# Patient Record
Sex: FEMALE | Race: WHITE | NOT HISPANIC OR LATINO | Employment: FULL TIME | URBAN - METROPOLITAN AREA
[De-identification: names, ages, dates, MRNs, and addresses within clinical notes are randomized per-mention and may not be internally consistent; named-entity substitution may affect disease eponyms.]

---

## 2017-09-19 DIAGNOSIS — Z12.31 ENCOUNTER FOR SCREENING MAMMOGRAM FOR MALIGNANT NEOPLASM OF BREAST: ICD-10-CM

## 2017-09-20 ENCOUNTER — ALLSCRIPTS OFFICE VISIT (OUTPATIENT)
Dept: OTHER | Facility: OTHER | Age: 52
End: 2017-09-20

## 2017-10-17 ENCOUNTER — GENERIC CONVERSION - ENCOUNTER (OUTPATIENT)
Dept: OTHER | Facility: OTHER | Age: 52
End: 2017-10-17

## 2017-11-13 ENCOUNTER — GENERIC CONVERSION - ENCOUNTER (OUTPATIENT)
Dept: OTHER | Facility: OTHER | Age: 52
End: 2017-11-13

## 2017-11-30 ENCOUNTER — GENERIC CONVERSION - ENCOUNTER (OUTPATIENT)
Dept: OTHER | Facility: OTHER | Age: 52
End: 2017-11-30

## 2017-11-30 LAB
BASOPHILS # BLD AUTO: 0 X10E3/UL (ref 0–0.2)
BASOPHILS # BLD AUTO: 1 %
CHOLEST SERPL-MCNC: 164 MG/DL (ref 100–199)
DEPRECATED RDW RBC AUTO: 13.6 % (ref 12.3–15.4)
EOSINOPHIL # BLD AUTO: 0 X10E3/UL (ref 0–0.4)
EOSINOPHIL # BLD AUTO: 1 %
HBA1C MFR BLD HPLC: 5 % (ref 4.8–5.6)
HCT VFR BLD AUTO: 42.5 % (ref 34–46.6)
HDLC SERPL-MCNC: 59 MG/DL
HGB BLD-MCNC: 14.5 G/DL (ref 11.1–15.9)
IMM.GRANULOCYTES (CD4/8) (HISTORICAL): 0 %
IMM.GRANULOCYTES (CD4/8) (HISTORICAL): 0 X10E3/UL (ref 0–0.1)
LDLC SERPL CALC-MCNC: 85 MG/DL (ref 0–99)
LYMPHOCYTES # BLD AUTO: 1.4 X10E3/UL (ref 0.7–3.1)
LYMPHOCYTES # BLD AUTO: 41 %
MCH RBC QN AUTO: 31.1 PG (ref 26.6–33)
MCHC RBC AUTO-ENTMCNC: 34.1 G/DL (ref 31.5–35.7)
MCV RBC AUTO: 91 FL (ref 79–97)
MONOCYTES # BLD AUTO: 0.2 X10E3/UL (ref 0.1–0.9)
MONOCYTES (HISTORICAL): 6 %
NEUTROPHILS # BLD AUTO: 1.7 X10E3/UL (ref 1.4–7)
NEUTROPHILS # BLD AUTO: 51 %
PLATELET # BLD AUTO: 181 X10E3/UL (ref 150–379)
RBC (HISTORICAL): 4.66 X10E6/UL (ref 3.77–5.28)
TRIGL SERPL-MCNC: 101 MG/DL (ref 0–149)
VLDLC SERPL CALC-MCNC: 20 MG/DL (ref 5–40)
WBC # BLD AUTO: 3.4 X10E3/UL (ref 3.4–10.8)

## 2017-12-01 LAB — TSH SERPL DL<=0.05 MIU/L-ACNC: 1.95 UIU/ML (ref 0.45–4.5)

## 2017-12-04 ENCOUNTER — GENERIC CONVERSION - ENCOUNTER (OUTPATIENT)
Dept: OTHER | Facility: OTHER | Age: 52
End: 2017-12-04

## 2017-12-21 ENCOUNTER — ALLSCRIPTS OFFICE VISIT (OUTPATIENT)
Dept: OTHER | Facility: OTHER | Age: 52
End: 2017-12-21

## 2017-12-22 NOTE — PROGRESS NOTES
Assessment   1  Postgastrectomy malabsorption (579 3) (K91 2,Z90 3)   2  Benign essential hypertension (401 1) (I10)   3  Depression (311) (F32 9)   4  Essential tremor (333 1) (G25 0)    Plan   Benign essential hypertension    · Lisinopril 10 MG Oral Tablet; take 1 tablet by mouth once daily  Essential tremor    · *1 - [facility]  *  Status: Active  Requested for: 68Dhr0030  Care Summary provided  : Yes    Discussion/Summary   Discussion Summary:    Taper off and stop propranolol, reduce to 1 tablet daily for 2 weeks, then reduce to 1/2 tablet daily for 2 weeks then stop   lisinopril for Blood pressure once off propranolol   maint issues, has orders for screenings   reviewed and are all good  to neurology for eval of tremors  Counseling Documentation With Imm: The patient was counseled regarding instructions for management  Medication SE Review and Pt Understands Tx: Possible side effects of new medications were reviewed with the patient/guardian today  The treatment plan was reviewed with the patient/guardian  The patient/guardian understands and agrees with the treatment plan      Chief Complaint   Chief Complaint Free Text Note Form: PT here for a 3 month check up - labs were done in Nov       History of Present Illness   HPI: c/o a few occassions of feeling dizzy and lightheaded, chronic fatigue      Review of Systems   Complete-Female:      Constitutional: feeling tired, but-- No fever, no chills, feels well, no tiredness, no recent weight gain or weight loss  Eyes: No complaints of eye pain, no red eyes, no eyesight problems, no discharge, no dry eyes, no itching of eyes  ENT: no complaints of earache, no loss of hearing, no nose bleeds, no nasal discharge, no sore throat, no hoarseness  Cardiovascular: slow heart rate, but-- No complaints of slow heart rate, no fast heart rate, no chest pain, no palpitations, no leg claudication, no lower extremity edema  Respiratory: No complaints of shortness of breath, no wheezing, no cough, no SOB on exertion, no orthopnea, no PND  Gastrointestinal: No complaints of abdominal pain, no constipation, no nausea or vomiting, no diarrhea, no bloody stools  Genitourinary: No complaints of dysuria, no incontinence, no pelvic pain, no dysmenorrhea, no vaginal discharge or bleeding  Musculoskeletal: No complaints of arthralgias, no myalgias, no joint swelling or stiffness, no limb pain or swelling  Integumentary: No complaints of skin rash or lesions, no itching, no skin wounds, no breast pain or lump  Neurological: dizziness, but-- No complaints of headache, no confusion, no convulsions, no numbness, no dizziness or fainting, no tingling, no limb weakness, no difficulty walking-- and-- no fainting  Psychiatric: Not suicidal, no sleep disturbance, no anxiety or depression, no change in personality, no emotional problems  Endocrine: No complaints of proptosis, no hot flashes, no muscle weakness, no deepening of the voice, no feelings of weakness  Hematologic/Lymphatic: No complaints of swollen glands, no swollen glands in the neck, does not bleed easily, does not bruise easily  Active Problems   1  Anxiety (300 00) (F41 9)   2  Benign essential hypertension (401 1) (I10)   3  Depression (311) (F32 9)   4  Essential tremor (333 1) (G25 0)   5  No advance directives (V49 89) (Z78 9)   6  Obesity (278 00) (E66 9)   7  Postgastrectomy malabsorption (579 3) (K91 2,Z90 3)   8  Vitamin B 12 deficiency (266 2) (E53 8)   9  Vitamin D deficiency (268 9) (E55 9)   10  Weight gain (783 1) (R63 5)    Past Medical History   1  History of No significant past medical history    Surgical History   1  History of Cholecystectomy   2  History of Gastric Surgery For Morbid Obesity Gastric Bypass    Family History   Mother    1  Family history of Diabetes Mellitus (V18 0)   2   Family history of Hypertension (V17 49)  Father    3  Family history of Hypertension (V17 49)  Sister    4  Family history of substance abuse (V17 0) (Z81 4)  Brother    5  Family history of Congenital Heart Disease   6  Family history of Congestive Heart Failure   7  Family history of Diabetes Mellitus (V18 0)   8  Family history of acute myocardial infarction (V17 3) (Z82 49)   9  Family history of substance abuse (V17 0) (Z81 4)   10  Family history of Hypertension (V17 49)  Family History    11  Family history of substance abuse (V17 0) (Z81 4)   12  Denied: Family history of Mental problem    Social History    · Denied: History of Drug use   · Former smoker   · Good dental hygiene   · Lives with friend   · Living Situation: Supportive and safe   · Denied: History of Never a smoker   · No advance directives (V49 89) (Z78 9)   · Social drinker    Current Meds    1  B-12 1000 MCG Sublingual Tablet Sublingual; Take as directed Recorded   2  Calcium Citrate 500 MG CAPS; take 1 capsule 4 times daily; Therapy: (Recorded:51Yyd7896) to Recorded   3  Citalopram Hydrobromide 40 MG Oral Tablet; take 1 tablet by mouth every day; Therapy: 20BDJ2416 to (Evaluate:15Lmc5614)  Requested for: 63Ahi0131; Last Rx:67Poh4571     Ordered   4  Multiple Vitamin TABS; Take 1 tablet daily Recorded   5  Propranolol HCl - 20 MG Oral Tablet; Take 1 tablet twice daily; Therapy: 96SFN8054 to 9845 8544)  Requested for: 73PGI4062; Last Rx:63Oxr8431     Ordered  Medication List Reviewed: The medication list was reviewed and updated today  Allergies   1  No Known Allergies    Vitals   Vital Signs    Recorded: 39Ivx0300 09:50AM   Heart Rate 48   Systolic 317, LUE, Sitting   Diastolic 84, LUE, Sitting   Height 5 ft 6 5 in   Weight 245 lb    BMI Calculated 38 95   BSA Calculated 2 19     Physical Exam        Constitutional      General appearance: No acute distress, well appearing and well nourished         Eyes      Conjunctiva and lids: No swelling, erythema or discharge  Pupils and irises: Equal, round and reactive to light  Ears, Nose, Mouth, and Throat      External inspection of ears and nose: Normal        Otoscopic examination: Tympanic membranes translucent with normal light reflex  Canals patent without erythema  Nasal mucosa, septum, and turbinates: Normal without edema or erythema  Oropharynx: Normal with no erythema, edema, exudate or lesions  Pulmonary      Respiratory effort: No increased work of breathing or signs of respiratory distress  Auscultation of lungs: Clear to auscultation  Cardiovascular      Palpation of heart: Normal PMI, no thrills  Auscultation of heart: Normal rate and rhythm, normal S1 and S2, without murmurs  Examination of extremities for edema and/or varicosities: Normal        Carotid pulses: Normal        Abdomen      Abdomen: Non-tender, no masses  Liver and spleen: No hepatomegaly or splenomegaly  Lymphatic      Palpation of lymph nodes in neck: No lymphadenopathy  Musculoskeletal      Gait and station: Normal        Digits and nails: Normal without clubbing or cyanosis  Inspection/palpation of joints, bones, and muscles: Normal        Skin      Skin and subcutaneous tissue: Normal without rashes or lesions  Neurologic      Cranial nerves: Cranial nerves 2-12 intact  Reflexes: 2+ and symmetric  Sensation: No sensory loss         Psychiatric      Orientation to person, place, and time: Normal        Mood and affect: Normal           Signatures    Electronically signed by : RONALD Hall ; Dec 21 2017 10:16AM EST                       (Author)

## 2018-01-10 NOTE — MISCELLANEOUS
Message   Recorded as Task   Date: 10/10/2016 09:31 AM, Created By: Irene Blackwell   Task Name: Follow Up   Assigned To: Ariadna Grier   Regarding Patient: Ketan Schultz, Status: Active   CommentAdella Miami - 10 Oct 2016 9:31 AM     TASK CREATED  Ariadna, I may have already sent this but not sure so if you would please, call patient to schedule follow up appt  Thanks   Per assigned task, I left message for THE [facility] patient about scheduling an appointment at our office since patient is overdue for a follow up  Active Problems    1  Anxiety (300 00) (F41 9)   2  Benign essential hypertension (401 1) (I10)   3  Depression (311) (F32 9)   4  Impaired fasting glucose (790 21) (R73 01)   5  Iron deficiency anemia (280 9) (D50 9)   6  Leukopenia (288 50) (D72 819)   7  Denied: History of Mental health problem   8  Morbid obesity (278 01) (E66 01)   9  No advance directives (V49 89) (Z78 9)   10  Obesity (278 00) (E66 9)   11  Obesity surgery status (V45 86) (Z98 84)   12  Postgastrectomy malabsorption (579 3) (K91 2,Z90 3)   13  Preop cardiovascular exam (V72 81) (Z01 810)   14  Denied: History of Substance abuse   15  Visit for pre-operative examination (V72 84) (Z01 818)   16  Vitamin B 12 deficiency (266 2) (E53 8)   17  Vitamin D deficiency (268 9) (E55 9)   18  Weight gain (783 1) (R63 5)    Current Meds   1  B-12 1000 MCG Sublingual Tablet Sublingual; Take as directed Recorded   2  Calcium Citrate 500 MG CAPS; take 1 capsule 4 times daily; Therapy: (Recorded:38Ayd7511) to Recorded   3  Citalopram Hydrobromide 40 MG Oral Tablet; take 1 tablet by mouth every day; Therapy: 94IWT9725 to (Jina Grant)  Requested for: 57VEQ5819; Last   Rx:08Jun2016 Ordered   4  Lisinopril 5 MG Oral Tablet; take 1 tablet every day; Therapy: 83OZC4553 to (Evaluate:03Jan2017)  Requested for: 78BHR6706; Last   Rx:40Toi0467 Ordered   5  Multiple Vitamin TABS; Take 1 tablet daily Recorded    Allergies    1  No Known Allergies    Signatures   Electronically signed by : Dinorah Gambino, ; Oct 10 2016  9:36AM EST                       (Author)

## 2018-01-13 NOTE — MISCELLANEOUS
Message   Recorded as Task   Date: 10/13/2017 10:10 AM, Created By: Harlo Fothergill   Task Name: Follow Up   Assigned To: Sarbjit Ray   Regarding Patient: Cristian Pacheco, Status: In Progress   Comment:    Amol Sellersla - 13 Oct 2017 10:10 AM     TASK CREATED  Please call patient to schedule 2 year f/u appointment with office  Thank you  Marcos Mcgowan - 16 Oct 2017 3:35 PM     TASK IN PROGRESS   Marcos Mcgowan - 16 Oct 2017 3:36 PM     TASK EDITED  Lynda Beach - 17 Oct 2017 9:26 AM     TASK EDITED  lm twice   Per assigned task for patient Asim Daniel unable to reach patient  LM twice  Active Problems    1  Anxiety (300 00) (F41 9)   2  Benign essential hypertension (401 1) (I10)   3  Depression (311) (F32 9)   4  Essential tremor (333 1) (G25 0)   5  No advance directives (V49 89) (Z78 9)   6  Obesity (278 00) (E66 9)   7  Postgastrectomy malabsorption (579 3) (K91 2,Z90 3)   8  Vitamin B 12 deficiency (266 2) (E53 8)   9  Vitamin D deficiency (268 9) (E55 9)   10  Weight gain (783 1) (R63 5)    Current Meds   1  B-12 1000 MCG Sublingual Tablet Sublingual; Take as directed Recorded   2  Calcium Citrate 500 MG CAPS; take 1 capsule 4 times daily; Therapy: (Recorded:16Cad6551) to Recorded   3  Citalopram Hydrobromide 40 MG Oral Tablet; take 1 tablet by mouth every day; Therapy: 40AVZ2074 to (Evaluate:43Nzq3046)  Requested for: 82ANZ8384; Last   Rx:29Jun2017 Ordered   4  Multiple Vitamin TABS; Take 1 tablet daily Recorded   5  Propranolol HCl - 20 MG Oral Tablet; Take 1 tablet twice daily; Therapy: 72GOG7365 to (Evaluate:96Ctb1852)  Requested for: 25HOK7682; Last   Rx:10Ktg9243 Ordered    Allergies    1   No Known Allergies    Signatures   Electronically signed by : John Browne, ; Oct 17 2017  9:26AM EST                       (Author)

## 2018-01-15 ENCOUNTER — GENERIC CONVERSION - ENCOUNTER (OUTPATIENT)
Dept: FAMILY MEDICINE CLINIC | Facility: HOSPITAL | Age: 53
End: 2018-01-15

## 2018-01-15 NOTE — MISCELLANEOUS
Message   Recorded as Task   Date: 11/09/2017 10:18 AM, Created By: Libby Singh   Task Name: Follow Up   Assigned To: Jaci Brennan   Regarding Patient: Vernida Hashimoto, Status: In Progress   Comment:    Mildred Sellers - 09 Nov 2017 10:18 AM     TASK CREATED  Please call patient to schedule 5 year f/u appointment with office  Thank you  Marcos Mcgowan - 10 Nov 2017 9:11 AM     TASK IN PROGRESS   Marcos Mcgowan - 10 Nov 2017 9:12 AM     TASK EDITED  Qukavitha Mikeying - 13 Nov 2017 10:33 AM     TASK EDITED  lm twice   Per assigned task for patient Ria Rao unable to reach patient  LM       Active Problems    1  Anxiety (300 00) (F41 9)   2  Benign essential hypertension (401 1) (I10)   3  Depression (311) (F32 9)   4  Essential tremor (333 1) (G25 0)   5  No advance directives (V49 89) (Z78 9)   6  Obesity (278 00) (E66 9)   7  Postgastrectomy malabsorption (579 3) (K91 2,Z90 3)   8  Vitamin B 12 deficiency (266 2) (E53 8)   9  Vitamin D deficiency (268 9) (E55 9)   10  Weight gain (783 1) (R63 5)    Current Meds   1  B-12 1000 MCG Sublingual Tablet Sublingual; Take as directed Recorded   2  Calcium Citrate 500 MG CAPS; take 1 capsule 4 times daily; Therapy: (Recorded:49Jts1631) to Recorded   3  Citalopram Hydrobromide 40 MG Oral Tablet; take 1 tablet by mouth every day; Therapy: 84HND3730 to (Evaluate:59Htx7520)  Requested for: 61EXF3003; Last   Rx:29Jun2017 Ordered   4  Multiple Vitamin TABS; Take 1 tablet daily Recorded   5  Propranolol HCl - 20 MG Oral Tablet; Take 1 tablet twice daily; Therapy: 01RSK1597 to (Evaluate:18Jan2018)  Requested for: 28ACA7568; Last   Rx:37Npp3618 Ordered    Allergies    1   No Known Allergies    Signatures   Electronically signed by : Zay Coburn, ; Nov 13 2017 10:33AM EST                       (Author)

## 2018-01-18 ENCOUNTER — GENERIC CONVERSION - ENCOUNTER (OUTPATIENT)
Dept: OTHER | Facility: OTHER | Age: 53
End: 2018-01-18

## 2018-01-18 NOTE — PROGRESS NOTES
Assessment    1  Benign essential hypertension (401 1) (I10)   2  Depression (311) (F32 9)   3  History of morbid obesity (V13 89) (Z87 898)   4  Obesity (278 00) (E66 9)   5  Encounter for preventive health examination (V70 0) (Z00 00)   6  Essential tremor (333 1) (G25 0)    Plan  Benign essential hypertension    · Lisinopril 5 MG Oral Tablet  Essential tremor    · Propranolol HCl - 20 MG Oral Tablet; Take 1 tablet twice daily  Obesity    · (1) HEMOGLOBIN A1C; Status:Active; Requested for:20Cdj1531;     Discussion/Summary  health maintenance visit Currently, she eats a poor diet and has an inadequate exercise regimen  Health maint, labs ordered, mammo ordered, colonoscopy discussed  HTN, adjust meds, stop lisinopril  Tremor, likely benign, consider neuro consult  ABD pian, consider HH, might need GI  The patient was counseled regarding instructions for management  Possible side effects of new medications were reviewed with the patient/guardian today  The treatment plan was reviewed with the patient/guardian  The patient/guardian understands and agrees with the treatment plan      Chief Complaint  Pt is here today for a yearly check up Medications are current  History of Present Illness  , Adult Female: The patient is being seen for a health maintenance evaluation  The last health maintenance visit was 1 yr year(s) ago  General Health: The patient's health since the last visit is described as good  She has regular dental visits  She denies vision problems  She denies hearing loss  Immunizations status: up to date  Lifestyle:  She has weight concerns  Screening:   HPI: c/o episodic confusion and SOB, also c/o occ mid epigastric pain      Review of Systems    Constitutional: feeling poorly and feeling tired, but No fever, no chills, feels well, no tiredness, no recent weight gain or weight loss     Eyes: No complaints of eye pain, no red eyes, no eyesight problems, no discharge, no dry eyes, no itching of eyes  ENT: no complaints of earache, no loss of hearing, no nose bleeds, no nasal discharge, no sore throat, no hoarseness  Cardiovascular: No complaints of slow heart rate, no fast heart rate, no chest pain, no palpitations, no leg claudication, no lower extremity edema  Respiratory: No complaints of shortness of breath, no wheezing, no cough, no SOB on exertion, no orthopnea, no PND  Gastrointestinal: abdominal pain, but No complaints of abdominal pain, no constipation, no nausea or vomiting, no diarrhea, no bloody stools, no nausea and no diarrhea  Genitourinary: No complaints of dysuria, no incontinence, no pelvic pain, no dysmenorrhea, no vaginal discharge or bleeding  Musculoskeletal: No complaints of arthralgias, no myalgias, no joint swelling or stiffness, no limb pain or swelling  Integumentary: No complaints of skin rash or lesions, no itching, no skin wounds, no breast pain or lump  Neurological: confusion, dizziness, limb weakness and tremor UE, but No complaints of headache, no confusion, no convulsions, no numbness, no dizziness or fainting, no tingling, no limb weakness, no difficulty walking  Psychiatric: Not suicidal, no sleep disturbance, no anxiety or depression, no change in personality, no emotional problems  Endocrine: No complaints of proptosis, no hot flashes, no muscle weakness, no deepening of the voice, no feelings of weakness  feelings of weakness   Hematologic/Lymphatic: No complaints of swollen glands, no swollen glands in the neck, does not bleed easily, does not bruise easily  Active Problems    1  Anxiety (300 00) (F41 9)   2  Benign essential hypertension (401 1) (I10)   3  Depression (311) (F32 9)   4  Encounter for screening mammogram for breast cancer (V76 12) (Z12 31)   5  Impaired fasting glucose (790 21) (R73 01)   6  Iron deficiency anemia (280 9) (D50 9)   7  Leukopenia (288 50) (D72 819)   8  Denied: History of Mental health problem   9  No advance directives (V49 89) (Z78 9)   10  Obesity (278 00) (E66 9)   11  Obesity surgery status (V45 86) (Z98 84)   12  Postgastrectomy malabsorption (579 3) (K91 2,Z90 3)   13  Preop cardiovascular exam (V72 81) (Z01 810)   14  Screening for colon cancer (V76 51) (Z12 11)   15  Denied: History of Substance abuse   16  Visit for pre-operative examination (V72 84) (Z01 818)   17  Vitamin B 12 deficiency (266 2) (E53 8)   18  Vitamin D deficiency (268 9) (E55 9)   19  Weight gain (783 1) (R63 5)    Past Medical History    · Denied: History of Mental health problem   · History of No significant past medical history   · Denied: History of Substance abuse    Surgical History    · History of Cholecystectomy   · History of Gastric Surgery For Morbid Obesity Gastric Bypass    Family History  Mother    · Family history of Diabetes Mellitus (V18 0)   · Family history of Hypertension (V17 49)  Father    · Family history of Hypertension (V17 49)  Sister    · Family history of substance abuse (V17 0) (Z81 4)  Brother    · Family history of Congenital Heart Disease   · Family history of Congestive Heart Failure   · Family history of Diabetes Mellitus (V18 0)   · Family history of acute myocardial infarction (V17 3) (Z82 49)   · Family history of substance abuse (V17 0) (Z81 4)   · Family history of Hypertension (V17 49)  Family History    · Family history of substance abuse (V17 0) (Z81 4)   · Denied: Family history of Mental problem    Social History    · Denied: History of Drug use   · Former smoker   · Good dental hygiene   · Lives with friend   · Living Situation: Supportive and safe   · Denied: History of Never a smoker   · No advance directives (V49 89) (Z78 9)   · Social drinker    Current Meds   1  B-12 1000 MCG Sublingual Tablet Sublingual; Take as directed Recorded   2  Calcium Citrate 500 MG CAPS; take 1 capsule 4 times daily; Therapy: (Recorded:40Qqb0329) to Recorded   3   Citalopram Hydrobromide 40 MG Oral Tablet; take 1 tablet by mouth every day; Therapy: 10GQX0355 to (Evaluate:15Zfh5874)  Requested for: 99CSF8206; Last   Rx:29Jun2017 Ordered   4  Lisinopril 5 MG Oral Tablet; take 1 tablet every day; Therapy: 74WBF2631 to (Hiren Sevilla)  Requested for: 28Fpm5698; Last   Rx:94Drq3407 Ordered   5  Multiple Vitamin TABS; Take 1 tablet daily Recorded    Allergies    1  No Known Allergies    Vitals   Recorded: 39JZP5560 08:43AM   Heart Rate 60, L PT   Pulse Quality Normal, L PT   Systolic 598, LUE, Sitting   Diastolic 98, LUE, Sitting   Height 5 ft 6 5 in   Weight 237 lb    BMI Calculated 37 68   BSA Calculated 2 16     Physical Exam    Constitutional   General appearance: No acute distress, well appearing and well nourished  Eyes   Conjunctiva and lids: No swelling, erythema or discharge  Pupils and irises: Equal, round, reactive to light  Ophthalmoscopic examination: Normal fundi and optic discs  Ears, Nose, Mouth, and Throat   External inspection of ears and nose: Normal     Otoscopic examination: Tympanic membranes translucent with normal light reflex  Canals patent without erythema  Hearing: Normal     Nasal mucosa, septum, and turbinates: Normal without edema or erythema  Lips, teeth, and gums: Normal, good dentition  Oropharynx: Normal with no erythema, edema, exudate or lesions  Neck   Neck: Supple, symmetric, trachea midline, no masses  Thyroid: Normal, no thyromegaly  Pulmonary   Respiratory effort: No increased work of breathing or signs of respiratory distress  Percussion of chest: Normal     Palpation of chest: Normal     Auscultation of lungs: Clear to auscultation  Cardiovascular   Palpation of heart: Normal PMI, no thrills  Auscultation of heart: Normal rate and rhythm, normal S1 and S2, no murmurs  Carotid pulses: 2+ bilaterally  Abdominal aorta: Normal     Femoral pulses: 2+ bilaterally  Pedal pulses: 2+ bilaterally      Examination of extremities for edema and/or varicosities: Normal     Chest   Breasts: Normal, no dimpling or skin changes appreciated  Palpation of breasts and axillae: Normal, no masses palpated  Abdomen   Abdomen: Non-tender, no masses  Liver and spleen: No hepatomegaly or splenomegaly  Examination for hernias: No hernia appreciated  Anus, perineum, and rectum: Normal sphincter tone, no masses, no prolapse  Stool sample for occult blood: Negative  Genitourinary   External genitalia and vagina: Normal, no lesions appreciated  Urethra: Normal, no discharge  Bladder: Not distended, no tenderness  Cervix: Normal, no lesions  Uterus: Normal size, no tenderness, no masses  Adnexa/Parametria: Normal, no masses or tenderness  Lymphatic   Palpation of lymph nodes in neck: No lymphadenopathy  Palpation of lymph nodes in axillae: No lymphadenopathy  Palpation of lymph nodes in groin: No lymphadenopathy  Palpation of lymph nodes in other areas: No lymphadenopathy  Musculoskeletal   Gait and station: Normal     Digits and nails: Normal without clubbing or cyanosis  Joints, bones, and muscles: Normal     Range of motion: Normal     Stability: Normal     Muscle strength/tone: Normal     Skin   Skin and subcutaneous tissue: Normal without rashes or lesions  Palpation of skin and subcutaneous tissue: Normal turgor  Neurologic   Cranial nerves: Cranial nerves II-XII intact  Reflexes: 2+ and symmetric  Sensation: No sensory loss  Psychiatric   Judgment and insight: Normal     Orientation to person, place, and time: Normal     Recent and remote memory: Intact      Mood and affect: Normal        Signatures   Electronically signed by : RONALD Guerrero ; Sep 20 2017  9:32AM EST                       (Author)

## 2018-01-19 ENCOUNTER — ALLSCRIPTS OFFICE VISIT (OUTPATIENT)
Dept: OTHER | Facility: OTHER | Age: 53
End: 2018-01-19

## 2018-01-20 NOTE — PROGRESS NOTES
Assessment   1  Intractable episodic tension-type headache (339 11) (G44 211)   2  Benign essential hypertension (401 1) (I10)   3  Obesity (278 00) (E66 9)   4  Postgastrectomy malabsorption (579 3) (K91 2,Z90 3)   5  Vitamin D deficiency (268 9) (E55 9)   6  Vitamin B 12 deficiency (266 2) (E53 8)    Plan   Intractable episodic tension-type headache    · Butalbital-APAP-Caff-Cod -36-30 MG Oral Capsule; TAKE 1 CAPSULE    EVERY 4 TO 6 HOURS AS NEEDED    Discussion/Summary      Reviewed hospital/ER records, did not receive fax, only CT and lab results  Recommend Butalbital/APAP for headache  Discussed goals for blood pressure: recommended below 140/90, if level exceeds over 200/130 or patient gets signs and sx  of stroke or MI, info given to patient, go to ER ASAP  Monitor temp  and continue to monitor sx  Recommend otc Iberogast for gastroparesis and GI motility disorder  Call office Monday concerning blood pressure readings and temp  readings  Safe to take 2 lisinopril if blood pressure goes above 140/90  RTO in 2 weeks  Possible side effects of new medications were reviewed with the patient/guardian today  The treatment plan was reviewed with the patient/guardian  The patient/guardian understands and agrees with the treatment plan      Chief Complaint   Pt is here complaining of a severe headache that came on suddenly 5 days ago  Went to [facility] ER x 2  Records in chart  Weaned off Propranolol  Nausea, Denies cough/cold symptoms  CR      History of Present Illness   HPI: 47year old white female presents with wife c/o ongoing abd  pain  and headaches  Was at [facility] yesterday and Monday, for severe headache, and abd  pain may be due to headache  A lot of headaches since meds  were changed  EKG and CT done of head was negative  Headache from back of head, and sometimes forehead area  Blood pressure fluctuation: 180/134, in the ambulance yesterday, today at home- 177/112, 191/108, 181/104   This afternoon was 142/84  Was given Zofran, and Metoclopramide  Hx  of gastric bypass, unable to take NSAID's, tried Tylenol which took the edge off only  Review of Systems        Constitutional: feeling poorly-- and-- feeling tired, but-- no fever-- and-- no chills  Gastrointestinal: abdominal pain,-- nausea-- and-- vomiting  Active Problems   1  Anxiety (300 00) (F41 9)   2  Benign essential hypertension (401 1) (I10)   3  Depression (311) (F32 9)   4  Essential tremor (333 1) (G25 0)   5  No advance directives (V49 89) (Z78 9)   6  Obesity (278 00) (E66 9)   7  Postgastrectomy malabsorption (579 3) (K91 2,Z90 3)   8  Vitamin B 12 deficiency (266 2) (E53 8)   9  Vitamin D deficiency (268 9) (E55 9)   10  Weight gain (783 1) (R63 5)    Social History    · Denied: History of Drug use   · Former smoker   · Good dental hygiene   · Lives with friend   · Living Situation: Supportive and safe   · Denied: History of Never a smoker   · No advance directives (V49 89) (Z78 9)   · Social drinker  The social history was reviewed and updated today  The social history was reviewed and is unchanged  Current Meds    1  B-12 1000 MCG Sublingual Tablet Sublingual; Take as directed Recorded   2  Calcium Citrate 500 MG CAPS; take 1 capsule 4 times daily; Therapy: (Recorded:[id number]) to Recorded   3  Citalopram Hydrobromide 40 MG Oral Tablet; take 1 tablet by mouth every day; Therapy: 46UYP1861 to (Evaluate:10Jun2018)  Requested for: 92Mir3226; Last     Rx:88Fpk1889 Ordered   4  Lisinopril 10 MG Oral Tablet; take 1 tablet by mouth once daily; Therapy: 01Ldw8779 to (Evaluate:19Jun2018)  Requested for: 15Ayo0518; Last     Rx:91Thi0905 Ordered   5  Metoclopramide HCl - 10 MG Oral Tablet; TAKE 1 TABLET 3 TIMES DAILY BEFORE     MEALS; Therapy: 54KDM4834 to Recorded   6  Multiple Vitamin TABS; Take 1 tablet daily Recorded   7  Ondansetron 4 MG Oral Tablet Disintegrating;      Therapy: 11DUI6353 to Recorded 8  Propranolol HCl - 20 MG Oral Tablet; Take 1 tablet twice daily; Therapy: 89OAM8330 to (Evaluate:18Jan2018)  Requested for: 38LZD1238; Last     Rx:20Sep2017 Ordered     The medication list was reviewed and updated today  Allergies   1  No Known Allergies    Vitals    Recorded: 00JDO0515 03:48PM Recorded: 81ZAZ8083 03:03PM   Temperature  99 2 F, Tympanic   Heart Rate  57   Systolic 533, RUE, Sitting 132, RUE, Sitting   Diastolic 90, RUE, Sitting 84, RUE, Sitting   BP CUFF SIZE  Large   Height  5 ft 6 5 in   Weight  235 lb    BMI Calculated  37 36   BSA Calculated  2 15     Physical Exam        Constitutional      General appearance: Abnormal  -- Appears in distress, head down, wearing dark glasses  Eyes      Conjunctiva and lids: No swelling, erythema or discharge  -- Darkness noted around eyes  Pupils and irises: Equal, round and reactive to light  Ears, Nose, Mouth, and Throat      External inspection of ears and nose: Normal        Otoscopic examination: Tympanic membranes translucent with normal light reflex  Canals patent without erythema  Nasal mucosa, septum, and turbinates: Normal without edema or erythema  Oropharynx: Normal with no erythema, edema, exudate or lesions  Pulmonary      Respiratory effort: No increased work of breathing or signs of respiratory distress  Auscultation of lungs: Clear to auscultation  Musculoskeletal      Gait and station: Normal        Digits and nails: Normal without clubbing or cyanosis         Inspection/palpation of joints, bones, and muscles: Normal           Signatures    Electronically signed by : YOKO Hogue; Jan 19 2018  4:19PM EST                       (Author)     Electronically signed by : RONALD Hernandez ; Jan 19 2018  4:32PM EST

## 2018-01-22 ENCOUNTER — HOSPITAL ENCOUNTER (INPATIENT)
Facility: HOSPITAL | Age: 53
LOS: 1 days | DRG: 069 | End: 2018-01-23
Attending: EMERGENCY MEDICINE | Admitting: HOSPITALIST
Payer: COMMERCIAL

## 2018-01-22 VITALS
WEIGHT: 237 LBS | BODY MASS INDEX: 37.2 KG/M2 | SYSTOLIC BLOOD PRESSURE: 146 MMHG | HEIGHT: 67 IN | HEART RATE: 60 BPM | DIASTOLIC BLOOD PRESSURE: 98 MMHG

## 2018-01-22 DIAGNOSIS — R51.9 OCCIPITAL HEADACHE: Primary | ICD-10-CM

## 2018-01-22 DIAGNOSIS — R51.9 INTRACTABLE HEADACHE: ICD-10-CM

## 2018-01-22 DIAGNOSIS — I10 UNCONTROLLED HYPERTENSION: ICD-10-CM

## 2018-01-22 LAB
ALBUMIN SERPL BCP-MCNC: 4.2 G/DL (ref 3.5–5)
ALP SERPL-CCNC: 76 U/L (ref 46–116)
ALT SERPL W P-5'-P-CCNC: 31 U/L (ref 12–78)
ANION GAP SERPL CALCULATED.3IONS-SCNC: 9 MMOL/L (ref 4–13)
AST SERPL W P-5'-P-CCNC: 19 U/L (ref 5–45)
BASOPHILS # BLD AUTO: 0.01 THOUSANDS/ΜL (ref 0–0.1)
BASOPHILS NFR BLD AUTO: 0 % (ref 0–1)
BILIRUB SERPL-MCNC: 0.4 MG/DL (ref 0.2–1)
BUN SERPL-MCNC: 11 MG/DL (ref 5–25)
CALCIUM SERPL-MCNC: 8.9 MG/DL (ref 8.3–10.1)
CHLORIDE SERPL-SCNC: 102 MMOL/L (ref 100–108)
CO2 SERPL-SCNC: 29 MMOL/L (ref 21–32)
CREAT SERPL-MCNC: 0.74 MG/DL (ref 0.6–1.3)
EOSINOPHIL # BLD AUTO: 0.01 THOUSAND/ΜL (ref 0–0.61)
EOSINOPHIL NFR BLD AUTO: 0 % (ref 0–6)
ERYTHROCYTE [DISTWIDTH] IN BLOOD BY AUTOMATED COUNT: 13.1 % (ref 11.6–15.1)
GFR SERPL CREATININE-BSD FRML MDRD: 94 ML/MIN/1.73SQ M
GLUCOSE SERPL-MCNC: 130 MG/DL (ref 65–140)
HCT VFR BLD AUTO: 45.5 % (ref 34.8–46.1)
HGB BLD-MCNC: 15.4 G/DL (ref 11.5–15.4)
LYMPHOCYTES # BLD AUTO: 1.01 THOUSANDS/ΜL (ref 0.6–4.47)
LYMPHOCYTES NFR BLD AUTO: 16 % (ref 14–44)
MCH RBC QN AUTO: 32.2 PG (ref 26.8–34.3)
MCHC RBC AUTO-ENTMCNC: 33.8 G/DL (ref 31.4–37.4)
MCV RBC AUTO: 95 FL (ref 82–98)
MONOCYTES # BLD AUTO: 0.53 THOUSAND/ΜL (ref 0.17–1.22)
MONOCYTES NFR BLD AUTO: 8 % (ref 4–12)
NEUTROPHILS # BLD AUTO: 4.94 THOUSANDS/ΜL (ref 1.85–7.62)
NEUTS SEG NFR BLD AUTO: 76 % (ref 43–75)
PLATELET # BLD AUTO: 192 THOUSANDS/UL (ref 149–390)
PMV BLD AUTO: 10.1 FL (ref 8.9–12.7)
POTASSIUM SERPL-SCNC: 3.7 MMOL/L (ref 3.5–5.3)
PROT SERPL-MCNC: 8 G/DL (ref 6.4–8.2)
RBC # BLD AUTO: 4.78 MILLION/UL (ref 3.81–5.12)
SODIUM SERPL-SCNC: 140 MMOL/L (ref 136–145)
WBC # BLD AUTO: 6.5 THOUSAND/UL (ref 4.31–10.16)

## 2018-01-22 PROCEDURE — 96361 HYDRATE IV INFUSION ADD-ON: CPT

## 2018-01-22 PROCEDURE — 96375 TX/PRO/DX INJ NEW DRUG ADDON: CPT

## 2018-01-22 PROCEDURE — 80053 COMPREHEN METABOLIC PANEL: CPT | Performed by: EMERGENCY MEDICINE

## 2018-01-22 PROCEDURE — 36415 COLL VENOUS BLD VENIPUNCTURE: CPT | Performed by: EMERGENCY MEDICINE

## 2018-01-22 PROCEDURE — 85025 COMPLETE CBC W/AUTO DIFF WBC: CPT | Performed by: EMERGENCY MEDICINE

## 2018-01-22 PROCEDURE — 96374 THER/PROPH/DIAG INJ IV PUSH: CPT

## 2018-01-22 RX ORDER — DEXAMETHASONE SODIUM PHOSPHATE 10 MG/ML
10 INJECTION, SOLUTION INTRAMUSCULAR; INTRAVENOUS ONCE
Status: COMPLETED | OUTPATIENT
Start: 2018-01-22 | End: 2018-01-22

## 2018-01-22 RX ORDER — DIPHENHYDRAMINE HYDROCHLORIDE 50 MG/ML
25 INJECTION INTRAMUSCULAR; INTRAVENOUS ONCE
Status: COMPLETED | OUTPATIENT
Start: 2018-01-22 | End: 2018-01-22

## 2018-01-22 RX ORDER — KETOROLAC TROMETHAMINE 30 MG/ML
30 INJECTION, SOLUTION INTRAMUSCULAR; INTRAVENOUS ONCE
Status: COMPLETED | OUTPATIENT
Start: 2018-01-22 | End: 2018-01-22

## 2018-01-22 RX ORDER — METOCLOPRAMIDE HYDROCHLORIDE 5 MG/ML
10 INJECTION INTRAMUSCULAR; INTRAVENOUS ONCE
Status: COMPLETED | OUTPATIENT
Start: 2018-01-22 | End: 2018-01-22

## 2018-01-22 RX ADMIN — KETOROLAC TROMETHAMINE 30 MG: 30 INJECTION, SOLUTION INTRAMUSCULAR; INTRAVENOUS at 22:29

## 2018-01-22 RX ADMIN — METOCLOPRAMIDE 10 MG: 5 INJECTION, SOLUTION INTRAMUSCULAR; INTRAVENOUS at 22:39

## 2018-01-22 RX ADMIN — DIPHENHYDRAMINE HYDROCHLORIDE 25 MG: 50 INJECTION, SOLUTION INTRAMUSCULAR; INTRAVENOUS at 22:34

## 2018-01-22 RX ADMIN — SODIUM CHLORIDE 1000 ML: 0.9 INJECTION, SOLUTION INTRAVENOUS at 22:00

## 2018-01-22 RX ADMIN — DEXAMETHASONE SODIUM PHOSPHATE 10 MG: 10 INJECTION, SOLUTION INTRAMUSCULAR; INTRAVENOUS at 22:35

## 2018-01-23 ENCOUNTER — HOSPITAL ENCOUNTER (INPATIENT)
Facility: HOSPITAL | Age: 53
LOS: 5 days | Discharge: HOME | DRG: 069 | End: 2018-01-28
Attending: INTERNAL MEDICINE | Admitting: INTERNAL MEDICINE
Payer: COMMERCIAL

## 2018-01-23 ENCOUNTER — APPOINTMENT (INPATIENT)
Dept: CT IMAGING | Facility: HOSPITAL | Age: 53
DRG: 069 | End: 2018-01-23
Payer: COMMERCIAL

## 2018-01-23 VITALS
OXYGEN SATURATION: 98 % | WEIGHT: 241.62 LBS | SYSTOLIC BLOOD PRESSURE: 193 MMHG | RESPIRATION RATE: 18 BRPM | BODY MASS INDEX: 38.83 KG/M2 | DIASTOLIC BLOOD PRESSURE: 100 MMHG | TEMPERATURE: 98.2 F | HEIGHT: 66 IN | HEART RATE: 60 BPM

## 2018-01-23 VITALS
SYSTOLIC BLOOD PRESSURE: 132 MMHG | WEIGHT: 245 LBS | HEIGHT: 67 IN | BODY MASS INDEX: 38.45 KG/M2 | HEART RATE: 48 BPM | DIASTOLIC BLOOD PRESSURE: 84 MMHG

## 2018-01-23 VITALS
BODY MASS INDEX: 36.88 KG/M2 | HEART RATE: 57 BPM | TEMPERATURE: 99.2 F | HEIGHT: 67 IN | DIASTOLIC BLOOD PRESSURE: 90 MMHG | WEIGHT: 235 LBS | SYSTOLIC BLOOD PRESSURE: 160 MMHG

## 2018-01-23 DIAGNOSIS — R51.9 INTRACTABLE HEADACHE: Primary | ICD-10-CM

## 2018-01-23 DIAGNOSIS — I67.841 REVERSIBLE CEREBROVASCULAR VASOCONSTRICTION SYNDROME: ICD-10-CM

## 2018-01-23 DIAGNOSIS — F17.200 NICOTINE DEPENDENCE: ICD-10-CM

## 2018-01-23 DIAGNOSIS — I60.9 SAH (SUBARACHNOID HEMORRHAGE) (HCC): ICD-10-CM

## 2018-01-23 DIAGNOSIS — F32.A DEPRESSION: ICD-10-CM

## 2018-01-23 PROBLEM — Z98.84 HISTORY OF GASTRIC BYPASS: Chronic | Status: ACTIVE | Noted: 2018-01-23

## 2018-01-23 PROBLEM — Z98.84 HISTORY OF GASTRIC BYPASS: Status: ACTIVE | Noted: 2018-01-23

## 2018-01-23 PROBLEM — E66.09 OBESITY DUE TO EXCESS CALORIES: Chronic | Status: ACTIVE | Noted: 2018-01-23

## 2018-01-23 PROBLEM — E66.09 OBESITY DUE TO EXCESS CALORIES: Status: ACTIVE | Noted: 2018-01-23

## 2018-01-23 PROBLEM — I10 ACCELERATED HYPERTENSION: Status: ACTIVE | Noted: 2018-01-23

## 2018-01-23 PROBLEM — K59.00 CONSTIPATION: Chronic | Status: ACTIVE | Noted: 2018-01-23

## 2018-01-23 PROBLEM — K59.00 CONSTIPATION: Status: ACTIVE | Noted: 2018-01-23

## 2018-01-23 PROBLEM — I10 HYPERTENSION: Chronic | Status: ACTIVE | Noted: 2018-01-22

## 2018-01-23 LAB
ANION GAP SERPL CALCULATED.3IONS-SCNC: 9 MMOL/L (ref 4–13)
BUN SERPL-MCNC: 10 MG/DL (ref 5–25)
CALCIUM SERPL-MCNC: 8.9 MG/DL (ref 8.3–10.1)
CHLORIDE SERPL-SCNC: 105 MMOL/L (ref 100–108)
CO2 SERPL-SCNC: 27 MMOL/L (ref 21–32)
CREAT SERPL-MCNC: 0.64 MG/DL (ref 0.6–1.3)
CRP SERPL QL: 3.2 MG/L
ERYTHROCYTE [DISTWIDTH] IN BLOOD BY AUTOMATED COUNT: 13.1 % (ref 11.6–15.1)
ERYTHROCYTE [SEDIMENTATION RATE] IN BLOOD: 13 MM/HOUR (ref 0–15)
GFR SERPL CREATININE-BSD FRML MDRD: 104 ML/MIN/1.73SQ M
GLUCOSE P FAST SERPL-MCNC: 134 MG/DL (ref 65–99)
GLUCOSE SERPL-MCNC: 134 MG/DL (ref 65–140)
HCT VFR BLD AUTO: 45.1 % (ref 34.8–46.1)
HGB BLD-MCNC: 14.7 G/DL (ref 11.5–15.4)
MCH RBC QN AUTO: 30.9 PG (ref 26.8–34.3)
MCHC RBC AUTO-ENTMCNC: 32.6 G/DL (ref 31.4–37.4)
MCV RBC AUTO: 95 FL (ref 82–98)
PLATELET # BLD AUTO: 204 THOUSANDS/UL (ref 149–390)
PMV BLD AUTO: 9.8 FL (ref 8.9–12.7)
POTASSIUM SERPL-SCNC: 3.9 MMOL/L (ref 3.5–5.3)
RBC # BLD AUTO: 4.76 MILLION/UL (ref 3.81–5.12)
SODIUM SERPL-SCNC: 141 MMOL/L (ref 136–145)
WBC # BLD AUTO: 3.2 THOUSAND/UL (ref 4.31–10.16)

## 2018-01-23 PROCEDURE — 86618 LYME DISEASE ANTIBODY: CPT | Performed by: PSYCHIATRY & NEUROLOGY

## 2018-01-23 PROCEDURE — 70496 CT ANGIOGRAPHY HEAD: CPT

## 2018-01-23 PROCEDURE — 99284 EMERGENCY DEPT VISIT MOD MDM: CPT

## 2018-01-23 PROCEDURE — 85027 COMPLETE CBC AUTOMATED: CPT | Performed by: NURSE PRACTITIONER

## 2018-01-23 PROCEDURE — 86146 BETA-2 GLYCOPROTEIN ANTIBODY: CPT | Performed by: PSYCHIATRY & NEUROLOGY

## 2018-01-23 PROCEDURE — 99223 1ST HOSP IP/OBS HIGH 75: CPT | Performed by: HOSPITALIST

## 2018-01-23 PROCEDURE — 86592 SYPHILIS TEST NON-TREP QUAL: CPT | Performed by: PSYCHIATRY & NEUROLOGY

## 2018-01-23 PROCEDURE — 86038 ANTINUCLEAR ANTIBODIES: CPT | Performed by: PSYCHIATRY & NEUROLOGY

## 2018-01-23 PROCEDURE — 80048 BASIC METABOLIC PNL TOTAL CA: CPT | Performed by: NURSE PRACTITIONER

## 2018-01-23 PROCEDURE — 86235 NUCLEAR ANTIGEN ANTIBODY: CPT | Performed by: PSYCHIATRY & NEUROLOGY

## 2018-01-23 PROCEDURE — 70498 CT ANGIOGRAPHY NECK: CPT

## 2018-01-23 PROCEDURE — 85652 RBC SED RATE AUTOMATED: CPT | Performed by: PSYCHIATRY & NEUROLOGY

## 2018-01-23 PROCEDURE — 86140 C-REACTIVE PROTEIN: CPT | Performed by: PSYCHIATRY & NEUROLOGY

## 2018-01-23 RX ORDER — LISINOPRIL 10 MG/1
10 TABLET ORAL DAILY
Status: DISCONTINUED | OUTPATIENT
Start: 2018-01-23 | End: 2018-01-23 | Stop reason: HOSPADM

## 2018-01-23 RX ORDER — KETOROLAC TROMETHAMINE 30 MG/ML
30 INJECTION, SOLUTION INTRAMUSCULAR; INTRAVENOUS EVERY 12 HOURS SCHEDULED
Status: DISCONTINUED | OUTPATIENT
Start: 2018-01-23 | End: 2018-01-23

## 2018-01-23 RX ORDER — NICOTINE 21 MG/24HR
1 PATCH, TRANSDERMAL 24 HOURS TRANSDERMAL DAILY
Status: DISCONTINUED | OUTPATIENT
Start: 2018-01-23 | End: 2018-01-23

## 2018-01-23 RX ORDER — DIPHENHYDRAMINE HYDROCHLORIDE 50 MG/ML
25 INJECTION INTRAMUSCULAR; INTRAVENOUS EVERY 6 HOURS PRN
Status: DISCONTINUED | OUTPATIENT
Start: 2018-01-23 | End: 2018-01-23

## 2018-01-23 RX ORDER — VERAPAMIL HYDROCHLORIDE 80 MG/1
40 TABLET ORAL EVERY 8 HOURS SCHEDULED
Status: DISCONTINUED | OUTPATIENT
Start: 2018-01-23 | End: 2018-01-25

## 2018-01-23 RX ORDER — MAGNESIUM SULFATE HEPTAHYDRATE 40 MG/ML
2 INJECTION, SOLUTION INTRAVENOUS
Status: DISCONTINUED | OUTPATIENT
Start: 2018-01-23 | End: 2018-01-23 | Stop reason: HOSPADM

## 2018-01-23 RX ORDER — DOCUSATE SODIUM 100 MG/1
100 CAPSULE, LIQUID FILLED ORAL 2 TIMES DAILY
Status: DISCONTINUED | OUTPATIENT
Start: 2018-01-24 | End: 2018-01-24 | Stop reason: SDUPTHER

## 2018-01-23 RX ORDER — SODIUM CHLORIDE 9 MG/ML
75 INJECTION, SOLUTION INTRAVENOUS CONTINUOUS
Status: DISCONTINUED | OUTPATIENT
Start: 2018-01-23 | End: 2018-01-23 | Stop reason: HOSPADM

## 2018-01-23 RX ORDER — CITALOPRAM 20 MG/1
20 TABLET ORAL DAILY
Status: DISCONTINUED | OUTPATIENT
Start: 2018-01-24 | End: 2018-01-23

## 2018-01-23 RX ORDER — SODIUM CHLORIDE 9 MG/ML
75 INJECTION, SOLUTION INTRAVENOUS CONTINUOUS
Status: CANCELLED | OUTPATIENT
Start: 2018-01-23

## 2018-01-23 RX ORDER — METOCLOPRAMIDE HYDROCHLORIDE 5 MG/ML
10 INJECTION INTRAMUSCULAR; INTRAVENOUS EVERY 6 HOURS PRN
Status: DISCONTINUED | OUTPATIENT
Start: 2018-01-23 | End: 2018-01-23

## 2018-01-23 RX ORDER — LISINOPRIL 10 MG/1
10 TABLET ORAL
Status: DISCONTINUED | OUTPATIENT
Start: 2018-01-24 | End: 2018-01-23

## 2018-01-23 RX ORDER — ONDANSETRON 2 MG/ML
4 INJECTION INTRAMUSCULAR; INTRAVENOUS EVERY 4 HOURS PRN
Status: DISCONTINUED | OUTPATIENT
Start: 2018-01-23 | End: 2018-01-28 | Stop reason: HOSPADM

## 2018-01-23 RX ORDER — HYDRALAZINE HYDROCHLORIDE 20 MG/ML
10 INJECTION INTRAMUSCULAR; INTRAVENOUS EVERY 6 HOURS PRN
Status: DISCONTINUED | OUTPATIENT
Start: 2018-01-23 | End: 2018-01-23 | Stop reason: HOSPADM

## 2018-01-23 RX ORDER — CITALOPRAM 20 MG/1
40 TABLET ORAL
Status: DISCONTINUED | OUTPATIENT
Start: 2018-01-24 | End: 2018-01-24

## 2018-01-23 RX ORDER — HYDRALAZINE HYDROCHLORIDE 20 MG/ML
10 INJECTION INTRAMUSCULAR; INTRAVENOUS EVERY 6 HOURS PRN
Status: CANCELLED | OUTPATIENT
Start: 2018-01-23

## 2018-01-23 RX ORDER — LISINOPRIL 10 MG/1
10 TABLET ORAL
Refills: 5 | COMMUNITY
Start: 2017-12-21 | End: 2018-02-09 | Stop reason: SDUPTHER

## 2018-01-23 RX ORDER — POLYETHYLENE GLYCOL 3350 17 G/17G
17 POWDER, FOR SOLUTION ORAL DAILY
Status: CANCELLED | OUTPATIENT
Start: 2018-01-24

## 2018-01-23 RX ORDER — ACETAMINOPHEN 325 MG/1
650 TABLET ORAL EVERY 4 HOURS PRN
Status: DISCONTINUED | OUTPATIENT
Start: 2018-01-23 | End: 2018-01-25

## 2018-01-23 RX ORDER — ONDANSETRON 2 MG/ML
4 INJECTION INTRAMUSCULAR; INTRAVENOUS EVERY 6 HOURS PRN
Status: DISCONTINUED | OUTPATIENT
Start: 2018-01-23 | End: 2018-01-23

## 2018-01-23 RX ORDER — VERAPAMIL HYDROCHLORIDE 80 MG/1
40 TABLET ORAL EVERY 8 HOURS SCHEDULED
Status: CANCELLED | OUTPATIENT
Start: 2018-01-23

## 2018-01-23 RX ORDER — METOCLOPRAMIDE HYDROCHLORIDE 5 MG/ML
10 INJECTION INTRAMUSCULAR; INTRAVENOUS EVERY 8 HOURS SCHEDULED
Status: DISCONTINUED | OUTPATIENT
Start: 2018-01-23 | End: 2018-01-23

## 2018-01-23 RX ORDER — AMOXICILLIN 250 MG
2 CAPSULE ORAL DAILY
Status: DISCONTINUED | OUTPATIENT
Start: 2018-01-24 | End: 2018-01-28 | Stop reason: HOSPADM

## 2018-01-23 RX ORDER — BUTALBITAL/ASPIRIN/CAFFEINE 50-325-40
1 CAPSULE ORAL EVERY 4 HOURS PRN
COMMUNITY
End: 2018-01-28 | Stop reason: HOSPADM

## 2018-01-23 RX ORDER — ONDANSETRON 2 MG/ML
4 INJECTION INTRAMUSCULAR; INTRAVENOUS EVERY 6 HOURS PRN
Status: DISCONTINUED | OUTPATIENT
Start: 2018-01-23 | End: 2018-01-23 | Stop reason: HOSPADM

## 2018-01-23 RX ORDER — BUTALBITAL, ACETAMINOPHEN AND CAFFEINE 50; 325; 40 MG/1; MG/1; MG/1
1 TABLET ORAL EVERY 4 HOURS PRN
Status: CANCELLED | OUTPATIENT
Start: 2018-01-23

## 2018-01-23 RX ORDER — BUTALBITAL, ACETAMINOPHEN AND CAFFEINE 50; 325; 40 MG/1; MG/1; MG/1
1 TABLET ORAL EVERY 4 HOURS PRN
Status: DISCONTINUED | OUTPATIENT
Start: 2018-01-23 | End: 2018-01-25

## 2018-01-23 RX ORDER — HYDRALAZINE HYDROCHLORIDE 20 MG/ML
5 INJECTION INTRAMUSCULAR; INTRAVENOUS EVERY 4 HOURS PRN
Status: DISCONTINUED | OUTPATIENT
Start: 2018-01-23 | End: 2018-01-25

## 2018-01-23 RX ORDER — POLYETHYLENE GLYCOL 3350 17 G/17G
17 POWDER, FOR SOLUTION ORAL DAILY
Status: DISCONTINUED | OUTPATIENT
Start: 2018-01-23 | End: 2018-01-23 | Stop reason: HOSPADM

## 2018-01-23 RX ORDER — VERAPAMIL HYDROCHLORIDE 80 MG/1
40 TABLET ORAL EVERY 8 HOURS SCHEDULED
Status: DISCONTINUED | OUTPATIENT
Start: 2018-01-23 | End: 2018-01-23 | Stop reason: HOSPADM

## 2018-01-23 RX ORDER — CITALOPRAM 20 MG/1
20 TABLET ORAL DAILY
Status: CANCELLED | OUTPATIENT
Start: 2018-01-24

## 2018-01-23 RX ORDER — SODIUM CHLORIDE 9 MG/ML
75 INJECTION, SOLUTION INTRAVENOUS CONTINUOUS
Status: DISCONTINUED | OUTPATIENT
Start: 2018-01-23 | End: 2018-01-25

## 2018-01-23 RX ORDER — HYDRALAZINE HYDROCHLORIDE 20 MG/ML
10 INJECTION INTRAMUSCULAR; INTRAVENOUS EVERY 6 HOURS PRN
Status: DISCONTINUED | OUTPATIENT
Start: 2018-01-23 | End: 2018-01-23

## 2018-01-23 RX ORDER — MAGNESIUM SULFATE HEPTAHYDRATE 40 MG/ML
2 INJECTION, SOLUTION INTRAVENOUS
Status: CANCELLED | OUTPATIENT
Start: 2018-01-24 | End: 2018-01-26

## 2018-01-23 RX ORDER — MAGNESIUM SULFATE HEPTAHYDRATE 40 MG/ML
2 INJECTION, SOLUTION INTRAVENOUS
Status: COMPLETED | OUTPATIENT
Start: 2018-01-24 | End: 2018-01-25

## 2018-01-23 RX ORDER — CITALOPRAM 40 MG/1
40 TABLET ORAL
Refills: 5 | COMMUNITY
Start: 2017-12-17 | End: 2018-01-28 | Stop reason: HOSPADM

## 2018-01-23 RX ORDER — CITALOPRAM 20 MG/1
20 TABLET ORAL DAILY
Status: DISCONTINUED | OUTPATIENT
Start: 2018-01-23 | End: 2018-01-23 | Stop reason: HOSPADM

## 2018-01-23 RX ORDER — HYDROCODONE BITARTRATE AND ACETAMINOPHEN 5; 325 MG/1; MG/1
1 TABLET ORAL EVERY 4 HOURS PRN
Status: DISCONTINUED | OUTPATIENT
Start: 2018-01-23 | End: 2018-01-25

## 2018-01-23 RX ORDER — POLYETHYLENE GLYCOL 3350 17 G/17G
17 POWDER, FOR SOLUTION ORAL DAILY
Status: DISCONTINUED | OUTPATIENT
Start: 2018-01-24 | End: 2018-01-23

## 2018-01-23 RX ORDER — METOCLOPRAMIDE 10 MG/1
10 TABLET ORAL 3 TIMES DAILY PRN
COMMUNITY
End: 2018-01-28 | Stop reason: HOSPADM

## 2018-01-23 RX ORDER — LISINOPRIL 10 MG/1
10 TABLET ORAL DAILY
Status: DISCONTINUED | OUTPATIENT
Start: 2018-01-24 | End: 2018-01-28 | Stop reason: HOSPADM

## 2018-01-23 RX ORDER — DIPHENHYDRAMINE HYDROCHLORIDE 50 MG/ML
25 INJECTION INTRAMUSCULAR; INTRAVENOUS EVERY 8 HOURS
Status: DISCONTINUED | OUTPATIENT
Start: 2018-01-23 | End: 2018-01-23

## 2018-01-23 RX ORDER — HYDRALAZINE HYDROCHLORIDE 20 MG/ML
10 INJECTION INTRAMUSCULAR; INTRAVENOUS ONCE
Status: COMPLETED | OUTPATIENT
Start: 2018-01-23 | End: 2018-01-23

## 2018-01-23 RX ORDER — LISINOPRIL 10 MG/1
10 TABLET ORAL DAILY
Status: CANCELLED | OUTPATIENT
Start: 2018-01-24

## 2018-01-23 RX ORDER — LABETALOL HYDROCHLORIDE 5 MG/ML
10 INJECTION, SOLUTION INTRAVENOUS EVERY 4 HOURS PRN
Status: DISCONTINUED | OUTPATIENT
Start: 2018-01-23 | End: 2018-01-23

## 2018-01-23 RX ORDER — NICOTINE 21 MG/24HR
1 PATCH, TRANSDERMAL 24 HOURS TRANSDERMAL DAILY
Status: DISCONTINUED | OUTPATIENT
Start: 2018-01-24 | End: 2018-01-24

## 2018-01-23 RX ORDER — ONDANSETRON 2 MG/ML
4 INJECTION INTRAMUSCULAR; INTRAVENOUS EVERY 6 HOURS PRN
Status: CANCELLED | OUTPATIENT
Start: 2018-01-23

## 2018-01-23 RX ORDER — POLYETHYLENE GLYCOL 3350 17 G/17G
17 POWDER, FOR SOLUTION ORAL DAILY
Status: DISCONTINUED | OUTPATIENT
Start: 2018-01-24 | End: 2018-01-28 | Stop reason: HOSPADM

## 2018-01-23 RX ORDER — BUTALBITAL, ACETAMINOPHEN AND CAFFEINE 50; 325; 40 MG/1; MG/1; MG/1
1 TABLET ORAL EVERY 4 HOURS PRN
Status: DISCONTINUED | OUTPATIENT
Start: 2018-01-23 | End: 2018-01-23 | Stop reason: HOSPADM

## 2018-01-23 RX ADMIN — METOCLOPRAMIDE 10 MG: 5 INJECTION, SOLUTION INTRAMUSCULAR; INTRAVENOUS at 08:55

## 2018-01-23 RX ADMIN — BUTALBITAL, ACETAMINOPHEN AND CAFFEINE 1 TABLET: 50; 325; 40 TABLET ORAL at 07:21

## 2018-01-23 RX ADMIN — HYDROMORPHONE HYDROCHLORIDE 0.5 MG: 1 INJECTION, SOLUTION INTRAMUSCULAR; INTRAVENOUS; SUBCUTANEOUS at 23:01

## 2018-01-23 RX ADMIN — POLYETHYLENE GLYCOL 3350 17 G: 17 POWDER, FOR SOLUTION ORAL at 14:01

## 2018-01-23 RX ADMIN — BISACODYL 10 MG: 5 TABLET, COATED ORAL at 14:01

## 2018-01-23 RX ADMIN — IOHEXOL 85 ML: 350 INJECTION, SOLUTION INTRAVENOUS at 16:13

## 2018-01-23 RX ADMIN — HYDROMORPHONE HYDROCHLORIDE 0.2 MG: 1 INJECTION, SOLUTION INTRAMUSCULAR; INTRAVENOUS; SUBCUTANEOUS at 17:48

## 2018-01-23 RX ADMIN — LISINOPRIL 10 MG: 10 TABLET ORAL at 08:55

## 2018-01-23 RX ADMIN — CITALOPRAM HYDROBROMIDE 20 MG: 20 TABLET ORAL at 14:01

## 2018-01-23 RX ADMIN — SODIUM CHLORIDE 100 ML/HR: 0.9 INJECTION, SOLUTION INTRAVENOUS at 14:01

## 2018-01-23 RX ADMIN — SODIUM CHLORIDE 75 ML/HR: 0.9 INJECTION, SOLUTION INTRAVENOUS at 23:04

## 2018-01-23 RX ADMIN — HYDRALAZINE HYDROCHLORIDE 10 MG: 20 INJECTION INTRAMUSCULAR; INTRAVENOUS at 17:48

## 2018-01-23 RX ADMIN — VERAPAMIL HYDROCHLORIDE 40 MG: 80 TABLET, FILM COATED ORAL at 15:37

## 2018-01-23 RX ADMIN — DIPHENHYDRAMINE HYDROCHLORIDE 25 MG: 50 INJECTION, SOLUTION INTRAMUSCULAR; INTRAVENOUS at 08:55

## 2018-01-23 RX ADMIN — SODIUM CHLORIDE 100 ML/HR: 0.9 INJECTION, SOLUTION INTRAVENOUS at 00:40

## 2018-01-23 RX ADMIN — MAGNESIUM SULFATE HEPTAHYDRATE 2 G: 40 INJECTION, SOLUTION INTRAVENOUS at 14:02

## 2018-01-23 RX ADMIN — HYDROMORPHONE HYDROCHLORIDE 0.2 MG: 1 INJECTION, SOLUTION INTRAMUSCULAR; INTRAVENOUS; SUBCUTANEOUS at 14:01

## 2018-01-23 NOTE — PROGRESS NOTES
Progress Note - Nick Caceres 1965, 47 y o  female MRN: [id number]    Unit/Bed#: [address] [place] [address] Encounter: [id number]    Primary Care Provider: Shree Sawant MD   Date and time admitted to hospital: 1/22/2018  9:29 PM    * Intractable headache   Assessment & Plan    Without known hx of migraines or headaches  ?related to recent weaning of propranolol  Recent CTH at [facility] ER reportedly negative, I do not have this results  D/W Neurology, may consider MRI Brain, appreciate their input   Continue migraine cocktail and add Magnesium        Hypertension   Assessment & Plan    Continue lisinopril 10 mg daily  Consider increasing to 20 mg if pressures are uncontrolled consistently         Constipation   Assessment & Plan    Add bowel regimen  Pt reports no significant BM in ~1 week  Abdomen exam benign  Consider increasing aggressiveness of regimen based on results         History of gastric bypass   Assessment & Plan    · Stable          VTE Pharmacologic Prophylaxis:   Pharmacologic: low risk, ambulate   Mechanical VTE Prophylaxis in Place: No    Patient Centered Rounds: I have performed bedside rounds with nursing staff today  Discussions with Specialists or Other Care Team Provider: Neuro BENJAMÍN Xiao    Education and Discussions with Family / Patient: patient  Significant other at bedside  Time Spent for Care: 30 minutes  More than 50% of total time spent on counseling and coordination of care as described above  Current Length of Stay: 0 day(s)    Current Patient Status: OBS  Certification Statement: The patient, admitted on an observation basis, will now require > 2 midnight hospital stay due to still with significant h/a, may need MRI per neurolgoy     Discharge Plan: not yet stable  Maybe 24 hours if doing well  Code Status: Level 1 - Full Code      Subjective:   Pt reports feeling a bit better but still with headache in occipital region   No current paresthesias but did note L arm numbness yesterday and pins/needles in fingertips  She denies any hx of this  She is wanting to get relief from her headache  She is also c/o constipation, no nausea or vomiting  Objective:     Vitals:   Temp (24hrs), Av 7 °F (36 5 °C), Min:97 4 °F (36 3 °C), Max:97 9 °F (36 6 °C)    HR:  [52-76] 52  Resp:  [16-20] 20  BP: (129-220)/() 135/80  SpO2:  [95 %-100 %] 97 %  Body mass index is 39 kg/m²  Input and Output Summary (last 24 hours):     No intake or output data in the 24 hours ending 18 1243    Physical Exam:     Physical Exam   Constitutional: She is oriented to person, place, and time  No distress  Cardiovascular: Normal rate, regular rhythm and intact distal pulses  Pulmonary/Chest: Effort normal and breath sounds normal  No respiratory distress  She has no wheezes  Abdominal: Soft  Bowel sounds are normal  She exhibits no distension  There is no tenderness  Musculoskeletal: She exhibits no edema  Neurological: She is alert and oriented to person, place, and time  No cranial nerve deficit  Skin: Skin is warm and dry  She is not diaphoretic  Psychiatric: She has a normal mood and affect  Nursing note and vitals reviewed  Additional Data:     Labs:      Results from last 7 days  Lab Units 18  0549 18  2200   WBC Thousand/uL 3 20* 6 50   HEMOGLOBIN g/dL 14 7 15 4   HEMATOCRIT % 45 1 45 5   PLATELETS Thousands/uL 204 192   NEUTROS PCT %  --  76*   LYMPHS PCT %  --  16   MONOS PCT %  --  8   EOS PCT %  --  0       Results from last 7 days  Lab Units 18  0600 18  2200   SODIUM mmol/L 141 140   POTASSIUM mmol/L 3 9 3 7   CHLORIDE mmol/L 105 102   CO2 mmol/L 27 29   BUN mg/dL 10 11   CREATININE mg/dL 0 64 0 74   CALCIUM mg/dL 8 9 8 9   TOTAL PROTEIN g/dL  --  8 0   BILIRUBIN TOTAL mg/dL  --  0 40   ALK PHOS U/L  --  76   ALT U/L  --  31   AST U/L  --  19   GLUCOSE RANDOM mg/dL 134 130           * I Have Reviewed All Lab Data Listed Above    * Additional Pertinent Lab Tests Reviewed: All Labs Within Last 24 Hours Reviewed    Imaging:    Imaging Reports Reviewed Today Include: none  Imaging Personally Reviewed by Myself Includes:  none    Recent Cultures (last 7 days):           Last 24 Hours Medication List:     bisacodyl 10 mg Oral Once   diphenhydrAMINE 25 mg Intravenous Q8H   ketorolac 30 mg Intravenous Q12H [address]   lisinopril 10 mg Oral Daily   magnesium sulfate 2 g Intravenous Q24H HENRY   metoclopramide 10 mg Intravenous Q8H [address]   polyethylene glycol 17 g Oral Daily        Today, Patient Was Seen By: Vasile Padron PA-C    ** Please Note: Dictation voice to text software may have been used in the creation of this document   **

## 2018-01-23 NOTE — ASSESSMENT & PLAN NOTE
Continue lisinopril 10 mg daily  Consider increasing to 20 mg if pressures are uncontrolled consistently

## 2018-01-23 NOTE — LETTER
January 28, 2018     Patient: Melina Martinez   YOB: 1965   Date of Visit: 1/23/2018       To Whom it May Concern:    Rupinder Petersen was admitted into the hospital between 1/22/2018 and 1/28/2018 and under my care between the dates of 1/23/2018 and 1/28/2018  She may return to work on 2/12/2018, but has been advised not to return to work until then  Additionally, even after that time, she may need time to allow for important follow up appointments  We appreciate your understanding in this matter  Sincerely,          Kristina Ford DO  Hospitalist for [place] [id number] [facility]   [address] [place] [address]   No name on file          CC: No Recipients

## 2018-01-23 NOTE — SOCIAL WORK
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

## 2018-01-23 NOTE — ASSESSMENT & PLAN NOTE
-as per neurology etiology is RCVS (reverisble cerebral vasoconstriction syndrome)  -CTA head: Small volume of subarachnoid hemorrhage at the vertex bilaterally right more so than left  No focal intracranial stenosis or aneurysm   No cause for the patient's subarachnoid hemorrhage   No significant carotid or vertebral stenosis    -cont Verapamil 40mg PO Q8H, started today   -as per my discussion with neuro attending, transfer to [place] as pt will need conventional angiogram

## 2018-01-23 NOTE — CONSULTS
Consultation - Neurology   Светлана Tomas 47 y o  female MRN: [id number]  Unit/Bed#: [address] [place] [address] Encounter: [id number]      Physician Requesting Consult: Benjamin Kim MD  Reason for Consult / Principal Problem:  Intractable headache  Hx and PE limited by:  None  Review of previous medical records completed  Family, was present at the bedside for history and examination    Assessment/Plan:  1  Intractable headache: likely Reversible Cerebral Vasoconstrictive syndrome (RCVS) with long term use of SSRI medication vs vasculitis vs stroke   Patient with constant headache since 1/15/18 with 4 episodes of Thunder clap headaches, sudden onset, crushing pain, nausea, vomiting and photosensitivity  -f/u CTA head and neck pending, if unremarkable likely transport to [facility] for conventional angiogram  -f/u MRI Brain with contrast pending  -continue IV fluids  -f/u labs: lyme, RPR, CLOVIS, Sjogren's, Beta-2 glycoprotein  C-reactive protein, sed rate  -continue pain management with opiate medication like morphine or dilaudid  -continue Magnesium Sulfate IV 2g daily   -start Verapamil; appreciate Internal medicine input  -decrease Celexa dose to 20 mg daily; consider changing to non SSRI medication    2  HTN:  Recently started on lisinopril by PCP     3  Tremor: 20 year history  Progressively worsening  Has outpatient appointment with [facility]      HPI: Светлана Tomas is a 47 y o  female pmh of HTN, Gastric bypass surgery, tremors, Vitamin B12 and D deficiency who  presents with intractable headache  Patient states that her  headache first started on 1/15/18 when she was at the [employer]  She was bending over in her car when she had a severe crushing headache, " It came on all of sudden and took me to my knees"  She was taken to [facility] where she had a CT Head which she reports was negative and was released with f/u with her PCP    She reports her headache being constant since then, but has had 3 more episodes of thunder clap headaches, sudden onset of severe occipital pain which  starts in her occipital region and then radiates to the top of her head  She describes accompanying photosensitivity, nausea, vomiting and "pins and needles" in her finger tips, but no visual changes, diplopia, or dizziness  She found some relief with the  Fioricet her PCP prescribed, hot and ice packs, sitting up with her head turned to the right, rocking, and sometimes moaning  She states she does not have increased stress or sleep deprivation, and actually sleeps well  PCP recommended her returning to the ED yesterday with her 4th severe headache and associated, numbness and tingling in her left arm, nausea, vomiting, and photosensitivity  Pain was not relieved until she received IV medicaiton  Of note, patient reports recent medication changes  She states that her PCP has been weaning her off of her propranolol over 4 weeks due to heart rate issues  She was cut down to 1/2 tab of propanol  and started lisinopril for her hypertension when her headache started on 1/15/18  She was taking propranolol for control of her tremors that she reports having for 20 years  She reports her tremors have been worsening and she has an outpatient appointment with [facility] on 3/15/18  ROS: 12 system cued query: negative except as noted in HPI      Historical Information     Past Medical History:   Diagnosis Date    Anxiety     Hypertension     Migraine      Past Surgical History:   Procedure Laterality Date    CHOLECYSTECTOMY      GASTRIC BYPASS         Social History         Family History: History reviewed  No pertinent family history        No Known Allergies  Meds:  all current active meds have been reviewed, current meds:   Current Facility-Administered Medications   Medication Dose Route Frequency    butalbital-acetaminophen-caffeine (FIORICET,ESGIC) -40 mg per tablet 1 tablet  1 tablet Oral Q4H PRN    diphenhydrAMINE (BENADRYL) injection 25 mg  25 mg Intravenous Q6H PRN    lisinopril (ZESTRIL) tablet 10 mg  10 mg Oral Daily    metoclopramide (REGLAN) injection 10 mg  10 mg Intravenous Q6H PRN    nicotine (NICODERM CQ) 14 mg/24hr TD 24 hr patch 1 patch  1 patch Transdermal Daily    ondansetron (ZOFRAN) injection 4 mg  4 mg Intravenous Q6H PRN    sodium chloride 0 9 % infusion  100 mL/hr Intravenous Continuous    and PTA meds:   Prior to Admission Medications   Prescriptions Last Dose Informant Patient Reported? Taking? butalbital-acetaminophen-caffeine-codeine (FIORICET WITH CODEINE) -96-30 MG per capsule   Yes Yes   Sig: Take 1 capsule by mouth every 4 (four) hours as needed for headaches   citalopram (CeleXA) 40 mg tablet   Yes No   Sig: Take 40 mg by mouth daily with breakfast   lisinopril (ZESTRIL) 10 mg tablet   Yes No   Sig: Take 10 mg by mouth daily with breakfast   metoclopramide (REGLAN) 10 mg tablet   Yes Yes   Sig: Take 10 mg by mouth 3 (three) times a day as needed      Facility-Administered Medications: None       Scheduled Meds:  lisinopril 10 mg Oral Daily   nicotine 1 patch Transdermal Daily     PRN Meds:   butalbital-acetaminophen-caffeine    diphenhydrAMINE    metoclopramide    ondansetron        Physical Exam:   Objective   Vitals:Blood pressure 135/80, pulse (!) 52, temperature 97 9 °F (36 6 °C), temperature source Oral, resp  rate 20, height 5' 6" (1 676 m), weight 110 kg (241 lb 10 oz), SpO2 97 %  ,Body mass index is 39 kg/m²  Physical Exam   Constitutional: She is oriented to person, place, and time  She appears well-developed and well-nourished  No distress  HENT:   Head: Normocephalic  Mouth/Throat: Oropharynx is clear and moist  No oropharyngeal exudate  Eyes: EOM are normal  Pupils are equal, round, and reactive to light  Right eye exhibits no discharge  Left eye exhibits no discharge  Neck: Normal range of motion   Neck supple  Slight tenderness on palpation of cervical spine   Cardiovascular: Normal rate, regular rhythm and normal heart sounds  No murmur heard  Pulmonary/Chest: Effort normal and breath sounds normal  No respiratory distress  Abdominal: Soft  Bowel sounds are normal  She exhibits no distension  There is no tenderness  Musculoskeletal: She exhibits no edema or tenderness  Neurological: She is alert and oriented to person, place, and time  She has normal strength  She has a normal Finger-Nose-Finger Test and a normal Heel to [employer]    Reflex Scores:       Tricep reflexes are 4+ on the right side and 4+ on the left side  Bicep reflexes are 4+ on the right side and 4+ on the left side  Brachioradialis reflexes are 4+ on the right side and 4+ on the left side  Patellar reflexes are 4+ on the right side and 4+ on the left side  Achilles reflexes are 4+ on the right side and 4+ on the left side  Skin: Skin is warm and dry  Psychiatric: She has a normal mood and affect  Her speech is normal        Patient was examined in bed with her spouse at bedside      Neurologic Exam     Mental Status   Oriented to person, place, and time  Disoriented to person: states full name and   Oriented to city  ([facility])  Oriented to time  Oriented to year, month, date and day  Registration: recalls 3 of 3 objects  Recall at 5 minutes: recalls 3 of 3 objects (also recalls 30minutes later)  Follows 3 step commands  Attention: normal  Concentration: normal    Speech: speech is normal   Knowledge: good  Able to perform simple calculations  Able to name object  Able to read  Able to repeat  Normal comprehension  Cranial Nerves     CN II   Visual fields full to confrontation  Right visual field deficit: none  Left visual field deficit: none     CN III, IV, VI   Pupils are equal, round, and reactive to light  Extraocular motions are normal    Right pupil: Size: 2 mm  Shape: regular  Reactivity: brisk  Left pupil: Size: 2 mm  Shape: regular  Reactivity: brisk  CN III: no CN III palsy  CN VI: no CN VI palsy  Nystagmus: none   Diplopia: none    CN V   Facial sensation intact  CN VII   Facial expression full, symmetric  CN VIII   Hearing: intact    CN IX, X   Palate: symmetric    CN XI   CN XI normal      CN XII   CN XII normal      Motor Exam   Muscle bulk: normal  Overall muscle tone: normal  Right arm pronator drift: absent  Left arm pronator drift: absent    Strength   Strength 5/5 throughout  Sensory Exam   Light touch normal    Vibration normal    Proprioception normal    Pinprick normal      Gait, Coordination, and Reflexes     Coordination   Finger to nose coordination: normal  Heel to shin coordination: normal    Tremor   Action tremor: right arm and left arm (right greater than left when holding arms out assessing pronator drift)    Reflexes   Right brachioradialis: 4+  Left brachioradialis: 4+  Right biceps: 4+  Left biceps: 4+  Right triceps: 4+  Left triceps: 4+  Right patellar: 4+  Left patellar: 4+  Right achilles: 4+  Left achilles: 4+  Right : 4+  Left : 4+  Right plantar: normal  Left plantar: normal  Right ankle clonus: present (2 beats)  Left ankle clonus: present (2 beats)      Lab Results:   I have personally reviewed pertinent reports    , CBC:   Results from last 7 days  Lab Units 01/23/18  0549 01/22/18  2200   WBC Thousand/uL 3 20* 6 50   RBC Million/uL 4 76 4 78   HEMOGLOBIN g/dL 14 7 15 4   HEMATOCRIT % 45 1 45 5   MCV fL 95 95   PLATELETS Thousands/uL 204 192   , BMP/CMP:   Results from last 7 days  Lab Units 01/23/18  0600 01/22/18  2200   SODIUM mmol/L 141 140   POTASSIUM mmol/L 3 9 3 7   CHLORIDE mmol/L 105 102   CO2 mmol/L 27 29   ANION GAP mmol/L 9 9   BUN mg/dL 10 11   CREATININE mg/dL 0 64 0 74   GLUCOSE RANDOM mg/dL 134 130   CALCIUM mg/dL 8 9 8 9   AST U/L  --  19   ALT U/L  --  31   ALK PHOS U/L  --  76   TOTAL PROTEIN g/dL  --  8 0   ALBUMIN g/dL  --  4 2   BILIRUBIN TOTAL mg/dL  --  0 40   EGFR ml/min/1 73sq m 104 94       Imaging Studies: No studies to review at this time  CTA head and neck, MRI brain pending      EEG, Echo, Pathology, and Other Studies: none to review, studies ordered and pending    VTE Prophylaxis:  Sequential compression device (Venodyne)     Counseling / Coordination of Care  Total time spent today 50 minutes  Greater than 50% of total time was spent with the patient and / or family counseling and / or coordination of care   A description of the counseling / coordination of care: review of history, plan of care and discussion with attending, patient and wife

## 2018-01-23 NOTE — ED PROVIDER NOTES
History  Chief Complaint   Patient presents with    Migraine     Migraine headache; has been to [facility] [place] by [facility] ED twice this week for same; taking Riiubq-Ywhy-Fxnuobafjqy-codeine  Has appt with Neuro in March 47 yo female presenting to ER with occipital headache  Per pt she never had headaches until 1 week ago - has had 3 since that time, this is the 4th  PCP was tapering her off propanolol (due to issues with HR) and Monday last week was last day - developed headache that day  States headaches come on pretty quickly and worsen, remains with mild nausea, photophobia  No fever or rash, neck supple  Had CT head at [facility] [place] [place] [facility] one of the 2 visits she was there this week  Around 1730 tonight headache returned and she called PCP who sent her in and told her to "push for admit and neuro consult"  History provided by:  Patient   used: No    Migraine   Severity:  Severe  Onset quality:  Sudden  Duration:  4 hours  Timing:  Constant  Progression:  Unchanged  Chronicity:  Recurrent  Associated symptoms: headaches (occipital headache)    Associated symptoms: no abdominal pain, no chest pain, no congestion, no diarrhea, no fatigue, no fever, no nausea, no rash, no shortness of breath, no sore throat and no vomiting        Prior to Admission Medications   Prescriptions Last Dose Informant Patient Reported? Taking?    butalbital-acetaminophen-caffeine-codeine (FIORICET WITH CODEINE) -48-30 MG per capsule   Yes Yes   Sig: Take 1 capsule by mouth every 4 (four) hours as needed for headaches   citalopram (CeleXA) 40 mg tablet   Yes No   Sig: Take 40 mg by mouth daily with breakfast   lisinopril (ZESTRIL) 10 mg tablet   Yes No   Sig: Take 10 mg by mouth daily with breakfast   metoclopramide (REGLAN) 10 mg tablet   Yes Yes   Sig: Take 10 mg by mouth 3 (three) times a day as needed      Facility-Administered Medications: None       Past Medical History:   Diagnosis Date    Anxiety     Hypertension     Migraine        Past Surgical History:   Procedure Laterality Date    CHOLECYSTECTOMY      GASTRIC BYPASS         History reviewed  No pertinent family history  I have reviewed and agree with the history as documented  Social History   Substance Use Topics    Smoking status: Light Tobacco Smoker    Smokeless tobacco: Never Used    Alcohol use Yes        Review of Systems   Constitutional: Negative for appetite change, chills, fatigue and fever  HENT: Negative for congestion, facial swelling and sore throat  Eyes: Negative for visual disturbance  Respiratory: Negative for shortness of breath  Cardiovascular: Negative for chest pain  Gastrointestinal: Negative for abdominal pain, diarrhea, nausea and vomiting  Genitourinary: Negative for dysuria, frequency, vaginal bleeding and vaginal discharge  Musculoskeletal: Negative for back pain, neck pain and neck stiffness  Skin: Negative for pallor and rash  Allergic/Immunologic: Negative for immunocompromised state  Neurological: Positive for headaches (occipital headache)  Negative for seizures and light-headedness  Psychiatric/Behavioral: Negative for confusion and decreased concentration  All other systems reviewed and are negative        Physical Exam  ED Triage Vitals   Temperature Pulse Respirations Blood Pressure SpO2   01/22/18 1951 01/22/18 1951 01/22/18 1951 01/22/18 1951 01/22/18 1951   (!) 97 4 °F (36 3 °C) 76 16 157/77 98 %      Temp Source Heart Rate Source Patient Position - Orthostatic VS BP Location FiO2 (%)   01/23/18 0021 01/22/18 1951 01/22/18 2245 01/22/18 2245 --   Oral Monitor Lying Right arm       Pain Score       01/22/18 1951       Worst Possible Pain           Orthostatic Vital Signs  Vitals:    01/22/18 2330 01/22/18 2345 01/23/18 0000 01/23/18 0021   BP: 140/80 129/76 139/81 (!) 200/101   Pulse: 64 65 60 59   Patient Position - Orthostatic VS:    Lying       Physical Exam   Constitutional: She is oriented to person, place, and time  She appears well-developed and well-nourished  No distress (uncomfortable)  HENT:   Head: Normocephalic and atraumatic  Right Ear: External ear normal    Left Ear: External ear normal    Mouth/Throat: Oropharynx is clear and moist    Eyes: EOM are normal  Pupils are equal, round, and reactive to light  Neck: Normal range of motion  Neck supple  Cardiovascular: Normal rate and regular rhythm  No murmur heard  Pulmonary/Chest: Effort normal and breath sounds normal  No respiratory distress  Abdominal: Soft  Bowel sounds are normal    Musculoskeletal: Normal range of motion  Neurological: She is alert and oriented to person, place, and time  She displays normal reflexes  No cranial nerve deficit or sensory deficit  She exhibits normal muscle tone  Coordination normal    Skin: Skin is warm  Capillary refill takes less than 2 seconds  No rash noted  No pallor  Psychiatric: She has a normal mood and affect  Her behavior is normal    Nursing note and vitals reviewed        ED Medications  Medications   sodium chloride 0 9 % infusion (100 mL/hr Intravenous New Bag 1/23/18 0040)   ondansetron (ZOFRAN) injection 4 mg (not administered)   nicotine (NICODERM CQ) 14 mg/24hr TD 24 hr patch 1 patch (not administered)   lisinopril (ZESTRIL) tablet 10 mg (not administered)   diphenhydrAMINE (BENADRYL) injection 25 mg (not administered)   metoclopramide (REGLAN) injection 10 mg (not administered)   butalbital-acetaminophen-caffeine (FIORICET,ESGIC) -40 mg per tablet 1 tablet (not administered)   sodium chloride 0 9 % bolus 1,000 mL (0 mL Intravenous Stopped 1/23/18 0046)   ketorolac (TORADOL) injection 30 mg (30 mg Intravenous Given 1/22/18 2229)   diphenhydrAMINE (BENADRYL) injection 25 mg (25 mg Intravenous Given 1/22/18 2234)   metoclopramide (REGLAN) injection 10 mg (10 mg Intravenous Given 1/22/18 2239)   dexamethasone (PF) (DECADRON) injection 10 mg (10 mg Intravenous Given 1/22/18 2235)       Diagnostic Studies  Results Reviewed     Procedure Component Value Units Date/Time    Comprehensive metabolic panel [[id number]] Collected:  01/22/18 2200    Lab Status:  Final result Specimen:  Blood from Line, Venous Updated:  01/22/18 2256     Sodium 140 mmol/L      Potassium 3 7 mmol/L      Chloride 102 mmol/L      CO2 29 mmol/L      Anion Gap 9 mmol/L      BUN 11 mg/dL      Creatinine 0 74 mg/dL      Glucose 130 mg/dL      Calcium 8 9 mg/dL      AST 19 U/L      ALT 31 U/L      Alkaline Phosphatase 76 U/L      Total Protein 8 0 g/dL      Albumin 4 2 g/dL      Total Bilirubin 0 40 mg/dL      eGFR 94 ml/min/1 73sq m     Narrative:         National Kidney Disease Education Program recommendations are as follows:  GFR calculation is accurate only with a steady state creatinine  Chronic Kidney disease less than 60 ml/min/1 73 sq  meters  Kidney failure less than 15 ml/min/1 73 sq  meters  CBC and differential [[id number]]  (Abnormal) Collected:  01/22/18 2200    Lab Status:  Final result Specimen:  Blood from Line, Venous Updated:  01/22/18 2235     WBC 6 50 Thousand/uL      RBC 4 78 Million/uL      Hemoglobin 15 4 g/dL      Hematocrit 45 5 %      MCV 95 fL      MCH 32 2 pg      MCHC 33 8 g/dL      RDW 13 1 %      MPV 10 1 fL      Platelets 610 Thousands/uL      Neutrophils Relative 76 (H) %      Lymphocytes Relative 16 %      Monocytes Relative 8 %      Eosinophils Relative 0 %      Basophils Relative 0 %      Neutrophils Absolute 4 94 Thousands/µL      Lymphocytes Absolute 1 01 Thousands/µL      Monocytes Absolute 0 53 Thousand/µL      Eosinophils Absolute 0 01 Thousand/µL      Basophils Absolute 0 01 Thousands/µL                  No orders to display              Procedures  Procedures       Phone Contacts  ED Phone Contact    ED Course  ED Course as of Jan 23 0149 Mon Jan 22, 2018   2130 Pt seen and examined  46 yo female presenting to ER with occipital headache    Per pt she never had headaches until 1 week ago - has had 3 since that time, this is the 4th  PCP was tapering her off propanolol (due to issues with HR) and Monday last week was last day - developed headache that day  States headaches come on pretty quickly and worsen, remains with mild nausea, photophobia  No fever or rash, neck supple  Had CT head at [facility] one of the 2 visits she was there this week  Around 1730 tonight headache returned and she called PCP who sent her in and told her to "push for admit and neuro consult"  Will give IVF, toradol, benadryl, reglan and decadron and reassess  2303 Pt much more comfortable and BP now 174/82  SLIM paged for admit  MDM  CritCare Time    Disposition  Final diagnoses:   Occipital headache   Uncontrolled hypertension     Time reflects when diagnosis was documented in both MDM as applicable and the Disposition within this note     Time User Action Codes Description Comment    1/22/2018 11:06 PM Ruthell Fleischer M Add [R51] Occipital headache     1/22/2018 11:06 PM Moser, [facility] Uncontrolled hypertension     1/22/2018 11:53 PM Amarilis Roman Add [R51] Intractable headache     1/22/2018 11:53 PM Amarilis Roman Modify [R51] Intractable headache       ED Disposition     ED Disposition Condition Comment    Admit  Case was discussed with Amarilis Zhang and the patient's admission status was agreed to be Admission Status: observation status to the service of Dr Janet Keith           Follow-up Information    None       Current Discharge Medication List      CONTINUE these medications which have NOT CHANGED    Details   butalbital-acetaminophen-caffeine-codeine (FIORICET WITH CODEINE) -25-30 MG per capsule Take 1 capsule by mouth every 4 (four) hours as needed for headaches      metoclopramide (REGLAN) 10 mg tablet Take 10 mg by mouth 3 (three) times a day as needed      citalopram (CeleXA) 40 mg tablet Take 40 mg by mouth daily with breakfast  Refills: 5      lisinopril (ZESTRIL) 10 mg tablet Take 10 mg by mouth daily with breakfast  Refills: 5           No discharge procedures on file      ED Provider  Electronically Signed by           Leo Kirk DO  01/23/18 7084

## 2018-01-23 NOTE — ASSESSMENT & PLAN NOTE
Without known hx of migraines or headaches  ?related to recent weaning of propranolol  Recent CTH at [facility] ER reportedly negative, I do not have this results  D/W Neurology, may consider MRI Brain, appreciate their input   Continue migraine cocktail and add Magnesium

## 2018-01-23 NOTE — RESULT NOTES
Verified Results  (1) CBC/PLT/DIFF [id number] 11:32AM Janelle [place]     Test Name Result Flag Reference   WBC 3 4 x10E3/uL  3 4-10 8   RBC 4 66 x10E6/uL  3 77-5 28   Hemoglobin 14 5 g/dL  11 1-15 9   **Effective December 4, 2017 the reference interval**                   for Hemoglobin MALES only will be changing to: Males 13-15 years: 12 6 - 17 7                                         Males   >15 years: 13 0 - 17 7   Hematocrit 42 5 %  34 0-46  6   MCV 91 fL  79-97   MCH 31 1 pg  26 6-33 0   MCHC 34 1 g/dL  31 5-35 7   RDW 13 6 %  12 3-15 4   Platelets 170 N18T8/KB  150-379   Neutrophils 51 %  Not Estab  Lymphs 41 %  Not Estab  Monocytes 6 %  Not Estab  Eos 1 %  Not Estab  Basos 1 %  Not Estab  Neutrophils (Absolute) 1 7 x10E3/uL  1 4-7 0   Lymphs (Absolute) 1 4 x10E3/uL  0 7-3 1   Monocytes(Absolute) 0 2 x10E3/uL  0 1-0 9   Eos (Absolute) 0 0 x10E3/uL  0 0-0 4   Baso (Absolute) 0 0 x10E3/uL  0 0-0 2   Immature Granulocytes 0 %  Not Estab     Immature Grans (Abs) 0 0 x10E3/uL  0 0-0 1

## 2018-01-23 NOTE — PROGRESS NOTES
Spoke with neuro  D/C migraine cocktail  Continue Mg scheduled daily, add dilaudid for pain given concern for RCVS  Keep IVF going  Added calcium channel blocker  Half celexa to 20 mg daily       Cassie Abarca PA-C

## 2018-01-23 NOTE — EMTALA/ACUTE CARE TRANSFER
[address] [id number]  Dept: [phone]      ACUTE CARE TRANSFER CONSENT    NAME Kavin Sandifer                                         1965                              MRN [id number]    I have been informed of my rights regarding examination, treatment, and transfer   by Dr Guanakito Sanz MD    Benefits:   for conventional angiogram     Risks:   deterioration of condition during transfer      Consent for Transfer:  I acknowledge that my medical condition has been evaluated and explained to me by the treating physician or other qualified medical person and/or my attending physician, who has recommended that I be transferred to the service of    at    The above potential benefits of such transfer, the potential risks associated with such transfer, and the probable risks of not being transferred have been explained to me, and I fully understand them  The doctor has explained that, in my case, the benefits of transfer outweigh the risks  I agree to be transferred  I authorize the performance of emergency medical procedures and treatments upon me in both transit and upon arrival at the receiving facility  Additionally, I authorize the release of any and all medical records to the receiving facility and request they be transported with me, if possible  I understand that the safest mode of transportation during a medical emergency is an ambulance and that the Hospital advocates the use of this mode of transport  Risks of traveling to the receiving facility by car, including absence of medical control, life sustaining equipment, such as oxygen, and medical personnel has been explained to me and I fully understand them  (SHAYAN CORRECT BOX BELOW)  [  ]  I consent to the stated transfer and to be transported by ambulance/helicopter    [  ]  I consent to the stated transfer, but refuse transportation by ambulance and accept full responsibility for my transportation by car  I understand the risks of non-ambulance transfers and I exonerate the Hospital and its staff from any deterioration in my condition that results from this refusal     X___________________________________________    DATE  18  TIME________  Signature of patient or legally responsible individual signing on patient behalf           RELATIONSHIP TO PATIENT_________________________          Provider Certification    NAME Renata Avila                                         1965                              MRN [id number]    A medical screening exam was performed on the above named patient  Based on the examination:    Condition Necessitating Transfer [facility]    Patient Condition:   stable    Reason for Transfer:   for conventional angiogram and tertiary care monitoring  Transfer Requirements: [address]  · Space available and qualified personnel available for treatment as acknowledged by    · Agreed to accept transfer and to provide appropriate medical treatment as acknowledged by        Dr Lynnette Mcgraw  · Appropriate medical records of the examination and treatment of the patient are provided at the time of transfer   [address] _______  · Transfer will be performed by qualified personnel from    and appropriate transfer equipment as required, including the use of necessary and appropriate life support measures      Provider Certification: I have examined the patient and explained the following risks and benefits of being transferred/refusing transfer to the patient/family:    deterioration of condition during transfer      Based on these reasonable risks and benefits to the patient and/or the unborn child(doug), and based upon the information available at the time of the patients examination, I certify that the medical benefits reasonably to be expected from the provision of appropriate medical treatments at another medical facility outweigh the increasing risks, if any, to the individuals medical condition, and in the case of labor to the unborn child, from effecting the transfer      X____________________________________________ DATE 01/23/18        TIME_______      ORIGINAL - SEND TO MEDICAL RECORDS   COPY - SEND WITH PATIENT DURING TRANSFER

## 2018-01-23 NOTE — ASSESSMENT & PLAN NOTE
-transfer to [facility] for conventional angiogram to determine if there are any <3mm aneurysms

## 2018-01-23 NOTE — CASE MANAGEMENT
Initial Clinical Review    Admission: Date/Time/Statement: 1/22/2108  2308 OBSERVATION  AND CHANGED TO INPATIENT 1/23/2018 1240    Inpatient Admission (Order [id number])   Admission   Date: 1/23/2018 Department: [address] [place] [address] Med Surg Unit Released By: Cassie Abarca PA-C (auto-released) Authorizing: Phoebe Guardado MD   Order Information     Order Name   INPATIENT ADMISSION [263]     The link below is only for use if the above order is AMB REFERRAL TO HOME HEALTH   Face to Face Certification Statement ([facility])   Order History   Inpatient   Date/Time Action Taken User Additional Information   01/23/18 1239 Release Cassie Abarca PA-C (auto-released) Carlton Newberry   01/23/18 1239 Complete Cassie Abarca PA-C    Order Details     Frequency Duration Priority Order Class   Once 1  occurrence Routine Hospital Performed   Order Questions     Question Answer Comment   Admitting Physician Trinidad CARDENAS    Level of Care Med Surg    Estimated length of stay More than 2 Midnights    Certification I certify that inpatient services are medically necessary for this patient for a duration of greater than two midnights  See H&P and MD Progress Notes for additional information about the patient's course of treatment  ED: Date/Time/Mode of Arrival:   ED Arrival Information     Expected Arrival Acuity Means of Arrival Escorted By Service Admission Type    - 1/22/2018 19:21 Urgent Walk-In Spouse General Medicine Urgent    Arrival Complaint    head pain          Chief Complaint:   Chief Complaint   Patient presents with    Migraine     Migraine headache; has been to [facility] ED twice this week for same; taking Rdrbvb-Pftk-Xszsglymolx-codeine  Has appt with Neuro in March  History of Illness: 47 y o  female with history of gastric bypass and hypertension who presents with sudden severe occipital headache    Patient states that she has had for similar headaches in 1 week    Patient was seen at [facility] twice for 2 days headaches  Patient was seen by PCP with 3rd 1 which occurred Friday  She was sent home with Tylenol with codeine and Fioricet which were effective  Patient again developed sudden severe pain and reported to ED for evaluation  Patient has no known history of headaches or migraines  Given migraine cocktail in the ED which was ineffective  While in the ED patient was noted to have /120  During that time she was in severe pain  Once pain was better controlled blood pressure went back down to normal     ED Vital Signs:   ED Triage Vitals   Temperature Pulse Respirations Blood Pressure SpO2   01/22/18 1951 01/22/18 1951 01/22/18 1951 01/22/18 1951 01/22/18 1951   (!) 97 4 °F (36 3 °C) 76 16 157/77 98 %      Temp Source Heart Rate Source Patient Position - Orthostatic VS BP Location FiO2 (%)   01/23/18 0021 01/22/18 1951 01/22/18 2245 01/22/18 2245 --   Oral Monitor Lying Right arm       Pain Score       01/22/18 1951       Worst Possible Pain        Wt Readings from Last 1 Encounters:   01/23/18 109 kg (241 lb 2 9 oz)       Vital Signs (abnormal):  /179 - 174/84    Abnormal Labs/Diagnostic Test Results:   Labs 1/23/2018- glucose 134      Wbc 3 20    ED Treatment:   Medication Administration from 01/22/2018 1921 to 01/23/2018 0014       Date/Time Order Dose Route Action Action by Comments     01/22/2018 2200 sodium chloride 0 9 % bolus 1,000 mL 1,000 mL Intravenous New Bag Rand Cash RN      01/22/2018 2229 ketorolac (TORADOL) injection 30 mg 30 mg Intravenous Given Tonna Shailesh, KEAGAN      01/22/2018 2234 diphenhydrAMINE (BENADRYL) injection 25 mg 25 mg Intravenous Given Tonna Salines, RN      01/22/2018 2239 metoclopramide (REGLAN) injection 10 mg 10 mg Intravenous Given Tonna Salines, RN      01/22/2018 2235 dexamethasone (PF) (DECADRON) injection 10 mg 10 mg Intravenous Given Tonna SalineKEAGAN barry           Past Medical/Surgical History: Active Ambulatory Problems     Diagnosis Date Noted    No Active Ambulatory Problems     Resolved Ambulatory Problems     Diagnosis Date Noted    No Resolved Ambulatory Problems     Past Medical History:   Diagnosis Date    Anxiety     Hypertension     Migraine        Admitting Diagnosis: Head pain [R51]  Occipital headache [R51]  Uncontrolled hypertension [I10]  Intractable headache [R51]    Age/Sex: 47 y o  female  Assessment/Plan: Intractable headache:  Given migraine cocktail in the ED which was effective  ? Will continue Benadryl and Reglan p r n   ? Fioricet p r n   ? Inpatient consult to Neurology     Plan for Additional Problems:   Hypertension:  BP elevated with pain  Continue lisinopril 10 mg daily  Good pain control  Monitor BP per nursing unit  Admission Orders: 1/22/2018  2308 OBSERVATION  AND CHANGED TO INPATIENT  1/23/2018 1240  Scheduled Meds:   lisinopril 10 mg Oral Daily   nicotine 1 patch Transdermal Daily     Continuous Infusions:   sodium chloride 100 mL/hr Last Rate: 100 mL/hr (01/23/18 0040)     PRN Meds:   butalbital-acetaminophen-caffeine - used x 1      diphenhydrAMINE  25 mg iv - used x 1      Metoclopramide  10 mg iv - used x 2      Ondansetron    OTHER ORDERS:  Consult neurology    Per attending 1/23 change to inpatient:   Intractable headache   Assessment & Plan     Without known hx of migraines or headaches  ?related to recent weaning of propranolol  Recent CTH at [facility] ER reportedly negative, I do not have this results  D/W Neurology, may consider MRI Brain, appreciate their input   Continue migraine cocktail and add Magnesium  addend; Spoke with neuro  D/C migraine cocktail  Continue Mg scheduled daily, add dilaudid for pain given concern for RCVS  Keep IVF going  Added calcium channel blocker   Half celexa to 20 mg daily       Hypertension   Assessment & Plan     Continue lisinopril 10 mg daily  Consider increasing to 20 mg if pressures are uncontrolled consistently           Constipation   Assessment & Plan     Add bowel regimen  Pt reports no significant BM in ~1 week  Abdomen exam benign  Consider increasing aggressiveness of regimen based on results           History of gastric bypass   Assessment & Plan     · Stable           Thank you,  [facility] in the [facility] by Tomer Hebert for 2017  Network Utilization Review Department  Phone: [phone]; Fax [phone]  ATTENTION: The Network Utilization Review Department is now centralized for our 7 Facilities  Make a note that we have a new phone and fax numbers for our Department  Please call with any questions or concerns to [phone] and carefully follow the prompts so that you are directed to the right person  All voicemails are confidential  Fax any determinations, approvals, denials, and requests for initial or continue stay review clinical to [phone]  Due to HIGH CALL volume, it would be easier if you could please send faxed requests to expedite your requests and in part, help us provide discharge notifications faster

## 2018-01-23 NOTE — ASSESSMENT & PLAN NOTE
Add bowel regimen  Pt reports no significant BM in ~1 week  Abdomen exam benign  Consider increasing aggressiveness of regimen based on results

## 2018-01-23 NOTE — DISCHARGE SUMMARY
Discharge- Melina Martinez 1965, 47 y o  female MRN: [id number]    Unit/Bed#: [address] [place] [address] Encounter: [id number]    Primary Care Provider: Valery Alfaro MD   Date and time admitted to hospital: 1/22/2018  9:29 PM        Hypertension   Assessment & Plan    -was on Lisinopril and Verapamil has been added for RCVS        Intractable headache   Assessment & Plan    -as per neurology etiology is RCVS (reverisble cerebral vasoconstriction syndrome)  -CTA head: Small volume of subarachnoid hemorrhage at the vertex bilaterally right more so than left  No focal intracranial stenosis or aneurysm   No cause for the patient's subarachnoid hemorrhage   No significant carotid or vertebral stenosis  -cont Verapamil 40mg PO Q8H, started today   -as per my discussion with neuro attending, transfer to [facility] as pt will need conventional angiogram        [facility] (subarachnoid hemorrhage) ([facility] [address] )   Assessment & Plan    -transfer to [place] [facility] for conventional angiogram to determine if there are any <3mm aneurysms            Consultations During Hospital Stay:  · Neurology    Procedures Performed:     · None    Significant Findings / Test Results:     · See narrative    Incidental Findings:   · See Narrative     Test Results Pending at Discharge (will require follow up): · None     Outpatient Tests Requested:  · N/A, being transferred to [facility]    Complications:  None    Reason for Admission: Intractable headache    Hospital Course:     Melina Martinez is a 47 y o  female patient who originally presented to the hospital on 1/22/2018 due to Intractable headache as outlined in the H&P done on admission  Please see above list of diagnoses and related plan for additional information  In addition, pt will need NPO after MN for angiogram tomorrow  Pt will need repeat CT brain in AM to assess SAH  Keep SBP<160      Condition at Discharge: good     Discharge Day Visit / Exam:     * Please refer to separate progress note for these details *    Discharge instructions/Information to patient and family:   See after visit summary for information provided to patient and family  Provisions for Follow-Up Care:  See after visit summary for information related to follow-up care and any pertinent home health orders  Disposition:     [address] Transfer to [address] [place] [address] to [facility] SNF:   · Not Applicable to this Patient - Not Applicable to this Patient    Planned Readmission: yes, [facility]     Discharge Statement:  I spent 40 minutes discharging the patient  This time was spent on the day of discharge  I had direct contact with the patient on the day of discharge  Greater than 50% of the total time was spent examining patient, answering all patient questions, arranging and discussing plan of care with patient as well as directly providing post-discharge instructions  Additional time then spent on discharge activities  Discharge Medications:  See after visit summary for reconciled discharge medications provided to patient and family        ** Please Note: This note has been constructed using a voice recognition system **

## 2018-01-23 NOTE — H&P
History and Physical - [facility] Internal Medicine    Patient Information: Leigh Duncan 47 y o  female MRN: [id number]  Unit/Bed#: [address] [place] [address] Encounter: [id number]  Admitting Physician: BENJAMÍN Nugent  PCP: Araceli Washington MD  Date of Admission:  01/23/18    Assessment/Plan:    Hospital Problem List:     Principal Problem:    Intractable headache  Active Problems:    Hypertension    History of gastric bypass      Plan for the Primary Problem(s):  · Intractable headache:  Given migraine cocktail in the ED which was effective  · Will continue Benadryl and Reglan p r n  · Fioricet p r n  · Inpatient consult to Neurology    Plan for Additional Problems:   · Hypertension:  BP elevated with pain  Continue lisinopril 10 mg daily  Good pain control  Monitor BP per nursing unit  VTE Prophylaxis: Patient is low risk for VTE  / sequential compression device   Code Status:  Full code  POLST: There is no POLST form on file for this patient (pre-hospital)    Anticipated Length of Stay:  Patient will be admitted on an Observation basis with an anticipated length of stay of  < 2 midnights  Justification for Hospital Stay:  Pain management    Total Time for Visit, including Counseling / Coordination of Care: 30 minutes  Greater than 50% of this total time spent on direct patient counseling and coordination of care  Chief Complaint:   Headache    History of Present Illness:    Leigh Duncan is a 47 y o  female with history of gastric bypass and hypertension who presents with sudden severe occipital headache  Patient states that she has had for similar headaches in 1 week  Patient was seen at [facility] twice for 2 days headaches  Patient was seen by PCP with 3rd 1 which occurred Friday  She was sent home with Tylenol with codeine and Fioricet which were effective  Patient again developed sudden severe pain and reported to ED for evaluation    Patient has no known history of headaches or migraines  Given migraine cocktail in the ED which was ineffective  While in the ED patient was noted to have /120  During that time she was in severe pain  Once pain was better controlled blood pressure went back down to normal     Review of Systems:    Review of Systems   Constitutional: Negative for appetite change, chills and fever  Eyes: Positive for photophobia  Respiratory: Negative for apnea, cough and shortness of breath  Cardiovascular: Negative for chest pain, palpitations and leg swelling  Gastrointestinal: Positive for nausea  Negative for abdominal distention, abdominal pain, constipation, diarrhea and vomiting  Genitourinary: Negative for dysuria  Neurological: Positive for headaches  Negative for seizures, facial asymmetry, weakness, light-headedness and numbness  Psychiatric/Behavioral: Negative for agitation and confusion  The patient is not nervous/anxious  All other systems reviewed and are negative  Past Medical and Surgical History:     Past Medical History:   Diagnosis Date    Anxiety     Hypertension     Migraine        Past Surgical History:   Procedure Laterality Date    CHOLECYSTECTOMY      GASTRIC BYPASS         Meds/Allergies:    Prior to Admission medications    Not on File     I have reviewed home medications with patient personally      Allergies: No Known Allergies    Social History:     Marital Status: /Civil Union   Occupation:  Works in sales  Patient Pre-hospital Living Situation:  Lives with wife and 2 children  Patient Pre-hospital Level of Mobility:  Independent  Patient Pre-hospital Diet Restrictions:  None  Substance Use History:   History   Alcohol Use    Yes     History   Smoking Status    Light Tobacco Smoker   Smokeless Tobacco    Never Used     History   Drug Use No       Family History:    non-contributory    Physical Exam:     Vitals:   Blood Pressure: (!) 200/101 (01/23/18 0021)  Pulse: 59 (01/23/18 0021)  Temperature: 97 9 °F (36 6 °C) (01/23/18 0021)  Temp Source: Oral (01/23/18 0021)  Respirations: 20 (01/23/18 0021)  Height: 5' 6" (167 6 cm) (01/23/18 0021)  Weight - Scale: 109 kg (241 lb 2 9 oz) (01/23/18 0021)  SpO2: 100 % (01/23/18 0021)    Physical Exam   Constitutional: She is oriented to person, place, and time  She appears well-developed and well-nourished  No distress  HENT:   Head: Normocephalic and atraumatic  Eyes: EOM are normal  Pupils are equal, round, and reactive to light  Neck: Normal range of motion  Neck supple  Cardiovascular: Normal rate, regular rhythm, normal heart sounds and intact distal pulses  Exam reveals no gallop and no friction rub  No murmur heard  Pulmonary/Chest: Effort normal and breath sounds normal  No respiratory distress  She has no wheezes  She has no rales  Abdominal: Soft  Bowel sounds are normal  She exhibits no distension  There is no tenderness  Musculoskeletal: Normal range of motion  She exhibits no edema  Neurological: She is alert and oriented to person, place, and time  Skin: Skin is warm and dry  Psychiatric: She has a normal mood and affect  Judgment normal    Nursing note and vitals reviewed  Additional Data:     Lab Results: I have personally reviewed pertinent reports          Results from last 7 days  Lab Units 01/22/18  2200   WBC Thousand/uL 6 50   HEMOGLOBIN g/dL 15 4   HEMATOCRIT % 45 5   PLATELETS Thousands/uL 192   NEUTROS PCT % 76*   LYMPHS PCT % 16   MONOS PCT % 8   EOS PCT % 0       Results from last 7 days  Lab Units 01/22/18  2200   SODIUM mmol/L 140   POTASSIUM mmol/L 3 7   CHLORIDE mmol/L 102   CO2 mmol/L 29   BUN mg/dL 11   CREATININE mg/dL 0 74   CALCIUM mg/dL 8 9   TOTAL PROTEIN g/dL 8 0   BILIRUBIN TOTAL mg/dL 0 40   ALK PHOS U/L 76   ALT U/L 31   AST U/L 19   GLUCOSE RANDOM mg/dL 130             Allscripts Records Reviewed: No     ** Please Note: Dragon 360 Dictation voice to text software may have been used in the creation of this document   **

## 2018-01-24 ENCOUNTER — ANESTHESIA (INPATIENT)
Dept: RADIOLOGY | Facility: HOSPITAL | Age: 53
DRG: 069 | End: 2018-01-24
Payer: COMMERCIAL

## 2018-01-24 ENCOUNTER — APPOINTMENT (INPATIENT)
Dept: RADIOLOGY | Facility: HOSPITAL | Age: 53
DRG: 069 | End: 2018-01-24
Attending: NEUROLOGICAL SURGERY
Payer: COMMERCIAL

## 2018-01-24 ENCOUNTER — ANESTHESIA EVENT (INPATIENT)
Dept: RADIOLOGY | Facility: HOSPITAL | Age: 53
DRG: 069 | End: 2018-01-24
Payer: COMMERCIAL

## 2018-01-24 ENCOUNTER — TELEPHONE (OUTPATIENT)
Dept: RADIOLOGY | Facility: HOSPITAL | Age: 53
End: 2018-01-24

## 2018-01-24 ENCOUNTER — APPOINTMENT (INPATIENT)
Dept: RADIOLOGY | Facility: HOSPITAL | Age: 53
DRG: 069 | End: 2018-01-24
Payer: COMMERCIAL

## 2018-01-24 LAB
APTT PPP: 27 SECONDS (ref 23–35)
B BURGDOR IGG SER IA-ACNC: 0.16
B BURGDOR IGM SER IA-ACNC: 0.25
ENA SCL70 AB SER-ACNC: <0.2 AI (ref 0–0.9)
ENA SS-A AB SER-ACNC: <0.2 AI (ref 0–0.9)
ENA SS-B AB SER-ACNC: <0.2 AI (ref 0–0.9)
INR PPP: 0.97 (ref 0.86–1.16)
PROTHROMBIN TIME: 12.9 SECONDS (ref 12.1–14.4)
RPR SER QL: NORMAL
RYE IGE QN: NEGATIVE

## 2018-01-24 PROCEDURE — 76937 US GUIDE VASCULAR ACCESS: CPT

## 2018-01-24 PROCEDURE — 99232 SBSQ HOSP IP/OBS MODERATE 35: CPT | Performed by: INTERNAL MEDICINE

## 2018-01-24 PROCEDURE — B315YZZ FLUOROSCOPY OF BILATERAL COMMON CAROTID ARTERIES USING OTHER CONTRAST: ICD-10-PCS | Performed by: RADIOLOGY

## 2018-01-24 PROCEDURE — 70450 CT HEAD/BRAIN W/O DYE: CPT

## 2018-01-24 PROCEDURE — 85730 THROMBOPLASTIN TIME PARTIAL: CPT | Performed by: PHYSICIAN ASSISTANT

## 2018-01-24 PROCEDURE — 36226 PLACE CATH VERTEBRAL ART: CPT | Performed by: RADIOLOGY

## 2018-01-24 PROCEDURE — C1894 INTRO/SHEATH, NON-LASER: HCPCS

## 2018-01-24 PROCEDURE — 36226 PLACE CATH VERTEBRAL ART: CPT

## 2018-01-24 PROCEDURE — 36223 PLACE CATH CAROTID/INOM ART: CPT | Performed by: RADIOLOGY

## 2018-01-24 PROCEDURE — 85610 PROTHROMBIN TIME: CPT | Performed by: PHYSICIAN ASSISTANT

## 2018-01-24 PROCEDURE — B31GYZZ FLUOROSCOPY OF BILATERAL VERTEBRAL ARTERIES USING OTHER CONTRAST: ICD-10-PCS | Performed by: RADIOLOGY

## 2018-01-24 PROCEDURE — C1760 CLOSURE DEV, VASC: HCPCS

## 2018-01-24 PROCEDURE — C1769 GUIDE WIRE: HCPCS

## 2018-01-24 PROCEDURE — 36223 PLACE CATH CAROTID/INOM ART: CPT

## 2018-01-24 PROCEDURE — 99255 IP/OBS CONSLTJ NEW/EST HI 80: CPT | Performed by: RADIOLOGY

## 2018-01-24 PROCEDURE — 99233 SBSQ HOSP IP/OBS HIGH 50: CPT | Performed by: PHYSICIAN ASSISTANT

## 2018-01-24 PROCEDURE — B41FYZZ FLUOROSCOPY OF RIGHT LOWER EXTREMITY ARTERIES USING OTHER CONTRAST: ICD-10-PCS | Performed by: RADIOLOGY

## 2018-01-24 PROCEDURE — 76937 US GUIDE VASCULAR ACCESS: CPT | Performed by: RADIOLOGY

## 2018-01-24 RX ORDER — METOCLOPRAMIDE HYDROCHLORIDE 5 MG/ML
10 INJECTION INTRAMUSCULAR; INTRAVENOUS EVERY 6 HOURS PRN
Status: DISCONTINUED | OUTPATIENT
Start: 2018-01-24 | End: 2018-01-28 | Stop reason: HOSPADM

## 2018-01-24 RX ORDER — MIDAZOLAM HYDROCHLORIDE 1 MG/ML
INJECTION INTRAMUSCULAR; INTRAVENOUS AS NEEDED
Status: DISCONTINUED | OUTPATIENT
Start: 2018-01-24 | End: 2018-01-24 | Stop reason: SURG

## 2018-01-24 RX ORDER — NICOTINE 21 MG/24HR
1 PATCH, TRANSDERMAL 24 HOURS TRANSDERMAL DAILY PRN
Status: DISCONTINUED | OUTPATIENT
Start: 2018-01-24 | End: 2018-01-28 | Stop reason: HOSPADM

## 2018-01-24 RX ORDER — FENTANYL CITRATE 50 UG/ML
INJECTION, SOLUTION INTRAMUSCULAR; INTRAVENOUS AS NEEDED
Status: DISCONTINUED | OUTPATIENT
Start: 2018-01-24 | End: 2018-01-24 | Stop reason: SURG

## 2018-01-24 RX ORDER — CITALOPRAM 20 MG/1
20 TABLET ORAL
Status: COMPLETED | OUTPATIENT
Start: 2018-01-25 | End: 2018-01-26

## 2018-01-24 RX ORDER — CITALOPRAM 10 MG/1
10 TABLET ORAL DAILY
Status: DISCONTINUED | OUTPATIENT
Start: 2018-01-27 | End: 2018-01-28 | Stop reason: HOSPADM

## 2018-01-24 RX ORDER — GLYCOPYRROLATE 0.2 MG/ML
INJECTION INTRAMUSCULAR; INTRAVENOUS AS NEEDED
Status: DISCONTINUED | OUTPATIENT
Start: 2018-01-24 | End: 2018-01-24 | Stop reason: SURG

## 2018-01-24 RX ORDER — PROPOFOL 10 MG/ML
INJECTION, EMULSION INTRAVENOUS AS NEEDED
Status: DISCONTINUED | OUTPATIENT
Start: 2018-01-24 | End: 2018-01-24 | Stop reason: SURG

## 2018-01-24 RX ADMIN — HYDRALAZINE HYDROCHLORIDE 5 MG: 20 INJECTION INTRAMUSCULAR; INTRAVENOUS at 13:18

## 2018-01-24 RX ADMIN — HYDROMORPHONE HYDROCHLORIDE 0.5 MG: 1 INJECTION, SOLUTION INTRAMUSCULAR; INTRAVENOUS; SUBCUTANEOUS at 17:21

## 2018-01-24 RX ADMIN — FENTANYL CITRATE 25 MCG: 50 INJECTION, SOLUTION INTRAMUSCULAR; INTRAVENOUS at 13:56

## 2018-01-24 RX ADMIN — MIDAZOLAM HYDROCHLORIDE 1 MG: 1 INJECTION, SOLUTION INTRAMUSCULAR; INTRAVENOUS at 13:50

## 2018-01-24 RX ADMIN — Medication 2 TABLET: at 09:07

## 2018-01-24 RX ADMIN — SODIUM CHLORIDE 75 ML/HR: 0.9 INJECTION, SOLUTION INTRAVENOUS at 17:22

## 2018-01-24 RX ADMIN — GLYCOPYRROLATE 0.2 MG: 0.2 INJECTION, SOLUTION INTRAMUSCULAR; INTRAVENOUS at 13:55

## 2018-01-24 RX ADMIN — HYDRALAZINE HYDROCHLORIDE 5 MG: 20 INJECTION INTRAMUSCULAR; INTRAVENOUS at 19:42

## 2018-01-24 RX ADMIN — ONDANSETRON 4 MG: 2 INJECTION INTRAMUSCULAR; INTRAVENOUS at 14:56

## 2018-01-24 RX ADMIN — VERAPAMIL HYDROCHLORIDE 40 MG: 80 TABLET, FILM COATED ORAL at 00:42

## 2018-01-24 RX ADMIN — PROPOFOL 30 MG: 10 INJECTION, EMULSION INTRAVENOUS at 14:18

## 2018-01-24 RX ADMIN — PROPOFOL 40 MG: 10 INJECTION, EMULSION INTRAVENOUS at 14:56

## 2018-01-24 RX ADMIN — FENTANYL CITRATE 25 MCG: 50 INJECTION, SOLUTION INTRAMUSCULAR; INTRAVENOUS at 14:40

## 2018-01-24 RX ADMIN — MAGNESIUM SULFATE HEPTAHYDRATE 2 G: 40 INJECTION, SOLUTION INTRAVENOUS at 09:06

## 2018-01-24 RX ADMIN — METOCLOPRAMIDE 10 MG: 5 INJECTION, SOLUTION INTRAMUSCULAR; INTRAVENOUS at 21:24

## 2018-01-24 RX ADMIN — FENTANYL CITRATE 25 MCG: 50 INJECTION, SOLUTION INTRAMUSCULAR; INTRAVENOUS at 14:56

## 2018-01-24 RX ADMIN — HYDROMORPHONE HYDROCHLORIDE 0.5 MG: 1 INJECTION, SOLUTION INTRAMUSCULAR; INTRAVENOUS; SUBCUTANEOUS at 21:32

## 2018-01-24 RX ADMIN — MIDAZOLAM HYDROCHLORIDE 1 MG: 1 INJECTION, SOLUTION INTRAMUSCULAR; INTRAVENOUS at 13:52

## 2018-01-24 RX ADMIN — FENTANYL CITRATE 25 MCG: 50 INJECTION, SOLUTION INTRAMUSCULAR; INTRAVENOUS at 14:14

## 2018-01-24 RX ADMIN — HYDROMORPHONE HYDROCHLORIDE 0.5 MG: 1 INJECTION, SOLUTION INTRAMUSCULAR; INTRAVENOUS; SUBCUTANEOUS at 04:40

## 2018-01-24 RX ADMIN — SODIUM CHLORIDE 75 ML/HR: 0.9 INJECTION, SOLUTION INTRAVENOUS at 06:35

## 2018-01-24 RX ADMIN — IODIXANOL 88 ML: 320 INJECTION, SOLUTION INTRAVASCULAR at 15:17

## 2018-01-24 RX ADMIN — ONDANSETRON 4 MG: 2 INJECTION INTRAMUSCULAR; INTRAVENOUS at 19:38

## 2018-01-24 RX ADMIN — LISINOPRIL 10 MG: 10 TABLET ORAL at 09:07

## 2018-01-24 RX ADMIN — HYDROMORPHONE HYDROCHLORIDE 0.5 MG: 1 INJECTION, SOLUTION INTRAMUSCULAR; INTRAVENOUS; SUBCUTANEOUS at 11:03

## 2018-01-24 RX ADMIN — Medication 0.4 MCG/KG/HR: at 13:56

## 2018-01-24 RX ADMIN — GLYCOPYRROLATE 0.2 MG: 0.2 INJECTION, SOLUTION INTRAMUSCULAR; INTRAVENOUS at 14:13

## 2018-01-24 NOTE — CONSULTS
Consultation - Neurosurgery   Criss Baker 47 y o  female MRN: [id number]  Unit/Bed#: [facility] 923-01 Encounter: [id number]      Assessment/Plan     Assessment:  1  Intractable headache associated a traumatic subarachnoid hemorrhage suspicion for reversible cerebral vasoconstrictive syndrome  2  Depression on SSRI  3  B12 deficiency  4  Vitamin-D deficiency  5  Hypertension  6  Tremor    Plan:  · Neuro exam:  Nonfocal, GCS 15, alert and oriented x3  · Imaging reviewed personally with attending  · CTA 1/23:  Bilateral vertex subarachnoid hemorrhage right greater than left  No focal intracranial stenosis or aneurysm identified  · CT head 1/24:  Stable subarachnoid hemorrhage  · IR cerebral angiography to take place today, await results  Ordered to evaluate for RCVS  · Patient will require groin checks and distal pulses per post angio protocol  · Continue medical management by primary team, SLIM  · Maintain systolic blood pressure less than 140  · Headache control:  May continue to use acetaminophen, Vicodin, Dilaudid  Per discussion with Neurology avoid Fioricet as caffeine component may increased risk for vasospas  · Discussed with Dr Kayla Armstrong  · Symptomatic management of nausea  · Defer chemical DVT prophylaxis at this time  SCDs and mobilization  · Appreciate neurology consultation  · Await angiogram for evaluation for RCVS  · Continue verapamil  · Weaned Celexa as SSRI may increase risk of RCVS  · Headache management as above  · Neurosurgery team to continue to follow, call with questions or concerns    History of Present Illness     HPI: Criss Baker is a 47y o  year old female , past medical history of hypertension, gastric bypass, tremors, B12, vitamin-D deficiency, presented with sudden onset of thunderclap headache 1/15 which occurred while bending down at [employer]  Patient was initially [facility] via EMS  CT head at that time was negative    Patient was instructed to follow up with her PCP  Patient states that headaches have been persistent since that onset, at best patient right headache as 3/10 located in frontal region occipital region  At worst, pain is rated over 10/10  The exacerbations scan lost overall 1 hour to 4 hours  Improvement with Fioricet, neck massage, darkening room  Total of 4 exacerbations occurred between onset on 01/15 and presentation on 01/23  Headaches are associated with nausea, photophobia, phonophobia, pins and needles in bilateral fingertips, neck stiffness  Yesterday patient developed in the needles throughout her entire left arm  This resolved spontaneously upon arrival to emergency room in treatment with Dilaudid  Patient admits to feeling unsteady on her feet while ambulating  Patient denies similar headache presentation in the past   She works in technology, 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 due to persistent headaches  Denies having frequent headaches prior to this presentation  She admits to mild head cold within the past few weeks which has resolved spontaneously  Patient is treated by her PCP for tremors with per panel all, this is recently been weaned to discontinue due to bradycardia  She is now transitioned to lisinopril  Patient admits to social drinking and cigarette smoking  Estimates she smokes several cigarettes several times a month  Admits to drinking 3 alcoholic beverages 2-3 nights a week  Inpatient consult to Neurosurgery  Consult performed by: Jazmin George  Consult ordered by: Kasey Postal          Review of Systems   Constitutional: Positive for activity change, appetite change and fatigue  Negative for fever  Eyes: Positive for photophobia and visual disturbance  Respiratory: Negative for shortness of breath  Cardiovascular: Negative for chest pain and palpitations  Gastrointestinal: Positive for nausea  Negative for abdominal pain     Genitourinary: Negative for difficulty urinating  Musculoskeletal: Positive for back pain (Secondary to positioning) and neck stiffness  Skin: Negative for color change  Neurological: Positive for dizziness, numbness and headaches  Psychiatric/Behavioral: Negative for confusion         Historical Information   Past Medical History:   Diagnosis Date    Anxiety     Hypertension     Tremor      Past Surgical History:   Procedure Laterality Date    CHOLECYSTECTOMY      GASTRIC BYPASS       History   Alcohol Use    Yes     Comment: Approximately 3 drinks 2-3 nights a week     History   Drug Use No     History   Smoking Status    Light Tobacco Smoker   Smokeless Tobacco    Never Used     Family History   Problem Relation Age of Onset    Hypertension Mother     Diabetes Mother     Hypertension Father     Heart disease Brother     Heart attack Brother        Meds/Allergies   all current active meds have been reviewed, current meds:   Current Facility-Administered Medications   Medication Dose Route Frequency    acetaminophen (TYLENOL) tablet 650 mg  650 mg Oral Q4H PRN    butalbital-acetaminophen-caffeine (FIORICET,ESGIC) -40 mg per tablet 1 tablet  1 tablet Oral Q4H PRN    [START ON 1/27/2018] citalopram (CeleXA) tablet 10 mg  10 mg Oral Daily    [START ON 1/25/2018] citalopram (CeleXA) tablet 20 mg  20 mg Oral Daily With Breakfast    hydrALAZINE (APRESOLINE) injection 5 mg  5 mg Intravenous Q4H PRN    HYDROcodone-acetaminophen (NORCO) 5-325 mg per tablet 1 tablet  1 tablet Oral Q4H PRN    HYDROmorphone (DILAUDID) injection 0 5 mg  0 5 mg Intravenous Q4H PRN    HYDROmorphone (DILAUDID) injection 0 5 mg  0 5 mg Intravenous Q2H PRN    lisinopril (ZESTRIL) tablet 10 mg  10 mg Oral Daily    magnesium sulfate 2 g/50 mL IVPB (premix) 2 g  2 g Intravenous Q24H HENRY    nicotine (NICODERM CQ) 14 mg/24hr TD 24 hr patch 1 patch  1 patch Transdermal Daily    ondansetron (ZOFRAN) injection 4 mg  4 mg Intravenous Q4H PRN    polyethylene glycol (MIRALAX) packet 17 g  17 g Oral Daily    senna-docusate sodium (SENOKOT S) 8 6-50 mg per tablet 2 tablet  2 tablet Oral Daily    sodium chloride 0 9 % infusion  75 mL/hr Intravenous Continuous    verapamil (CALAN) tablet 40 mg  40 mg Oral Q8H [address]    and PTA meds:   Prior to Admission Medications   Prescriptions Last Dose Informant Patient Reported? Taking? butalbital-acetaminophen-caffeine-codeine (FIORICET WITH CODEINE) -16-30 MG per capsule   Yes No   Sig: Take 1 capsule by mouth every 4 (four) hours as needed for headaches   citalopram (CeleXA) 40 mg tablet   Yes No   Sig: Take 40 mg by mouth daily with breakfast   lisinopril (ZESTRIL) 10 mg tablet   Yes No   Sig: Take 10 mg by mouth daily with breakfast   metoclopramide (REGLAN) 10 mg tablet   Yes No   Sig: Take 10 mg by mouth 3 (three) times a day as needed      Facility-Administered Medications: None     No Known Allergies    Objective     Intake/Output Summary (Last 24 hours) at 01/24/18 1611  Last data filed at 01/24/18 1459   Gross per 24 hour   Intake              720 ml   Output                0 ml   Net              720 ml       Physical Exam   Constitutional: She is oriented to person, place, and time  She appears well-developed and well-nourished  HENT:   Head: Normocephalic and atraumatic  Eyes: Conjunctivae and EOM are normal  Pupils are equal, round, and reactive to light  Neck:   Decreased range of motion, tenderness to posterior palpation  Cardiovascular: Normal rate  Pulmonary/Chest: Effort normal    Abdominal: Soft  She exhibits no distension  There is no tenderness  Musculoskeletal: Normal range of motion  Neurological: She is alert and oriented to person, place, and time  She has a normal Finger-Nose-Finger Test  GCS eye subscore is 4  GCS verbal subscore is 5  GCS motor subscore is 6  Reflex Scores:       Bicep reflexes are 2+ on the right side and 2+ on the left side         Brachioradialis reflexes are 2+ on the right side and 2+ on the left side  Patellar reflexes are 3+ on the right side and 3+ on the left side  Skin: Skin is warm and dry  Psychiatric: Her speech is normal      Neurologic Exam     Mental Status   Oriented to person, place, and time  Follows 3 step commands  Attention: normal  Concentration: normal    Speech: speech is normal   Level of consciousness: alert  Knowledge: good  Able to perform simple calculations  Able to name object  Able to repeat  Normal comprehension  Cranial Nerves   Cranial nerves II through XII intact  CN III, IV, VI   Pupils are equal, round, and reactive to light  Extraocular motions are normal      Motor Exam Full strength in bilateral upper and lower extremities  Sensory Exam   Right leg pinprick: normal  Left leg pinprick: normal  No sensory deficits identified bilateral upper and lower extremities  Despite objective complaint of fingertip numbness  Gait, Coordination, and Reflexes     Coordination   Finger to nose coordination: normal    Tremor   Resting tremor: absent    Reflexes   Right brachioradialis: 2+  Left brachioradialis: 2+  Right biceps: 2+  Left biceps: 2+  Right patellar: 3+  Left patellar: 3+  Right ankle clonus: present (One beat)  Left ankle clonus: present (One beat)      Vitals:Blood pressure 140/71, pulse 60, temperature 97 6 °F (36 4 °C), temperature source Axillary, resp  rate 16, height 5' 6" (1 676 m), weight 110 kg (242 lb 8 1 oz), last menstrual period 12/21/2016, SpO2 96 %, not currently breastfeeding  ,Body mass index is 39 14 kg/m²  Lab Results:   I have personally reviewed pertinent results  Lab Results   Component Value Date    INR 0 97 01/24/2018       Imaging Studies: I have personally reviewed pertinent reports  and I have personally reviewed pertinent films in PACS    EKG, Pathology, and Other Studies: I have personally reviewed pertinent reports        VTE Prophylaxis: Reason for no pharmacologic prophylaxis SAH    Code Status: Level 1 - Full Code  Advance Directive and Living Will:      Power of :    POLST:

## 2018-01-24 NOTE — CASE MANAGEMENT
Initial Clinical Review    Admission: Date/Time/Statement: 1/23/18 @ 2157     Orders Placed This Encounter   Procedures    Inpatient Admission     Standing Status:   Standing     Number of Occurrences:   1     Order Specific Question:   Admitting Physician     Answer:   Hannah Grande     Order Specific Question:   Level of Care     Answer:   Level 2 Stepdown / HOT [14]     Order Specific Question:   Estimated length of stay     Answer:   More than 2 Midnights     Order Specific Question:   Certification     Answer:   I certify that inpatient services are medically necessary for this patient for a duration of greater than two midnights  See H&P and MD Progress Notes for additional information about the patient's course of treatment  1/22/2018 - 1/23/2018 (23 hours)  [facility]  (subarachnoid hemorrhage) [facility])   TRANSFER TO [address] [place] [address] is a 47 y o  female with history of gastric bypass and hypertension who presents with sudden severe occipital headache  Patient states that she has had for similar headaches in 1 week  Patient was seen at [facility] twice for 2 days headaches  Patient was seen by PCP with 3rd 1 which occurred Friday  She was sent home with Tylenol with codeine and Fioricet which were effective  Patient again developed sudden severe pain and reported to ED for evaluation  Patient has no known history of headaches or migraines  Given migraine cocktail in the ED which was ineffective  While in the ED patient was noted to have /120  During that time she was in severe pain    Once pain was better controlled blood pressure went back down to normal   Positive for photophobia    History of Illness:    Katherine Lopez is a 47 y o  female with history of obesity, gastric bypass, hypertension who presented originally to [facility] on 1/22/18 with intractable headache described as sudden onset of severe throbbing occipital headache, constant ,aggravated with noise and light, associated with nausea  Patient had at least 3- 4 episodes of similar headache over the past week, Tylenol with codeine and Fioricet partially relieved the pain  Blood pressure upon presentation to the emergency room 220/120, during that time she felt severe headache  Neurology  wanted to rule out are CVS and  ordered CTA head /neck which revealed Small volume of subarachnoid hemorrhage at the vertex bilaterally right more so than left  Patient was transferred to [facility] for neurology and neurosurgeon evaluation and cerebral angiogram for which she will be   NPO post midnight  She will need repeat CT brain in a m  keep SBP<155   Dr Mosetta Gowers and Suad Jauregui made aware of case      Upon arrival to the floor high me immediately assessed the patient  Her female partner endorses that patient was informed to be transferred for cerebral angiogram stat  I reassured them that there is no need of imaging study to be done stat given no focal neurologic deficit or deterioration of general condition and  hemodynamics stability, partner and patient are agreeable  Patient will be seen by neurologist and neurosurgeon in a m  Temperature Pulse Respirations Blood Pressure SpO2   01/23/18 2307 01/23/18 2307 01/23/18 2307 01/23/18 2307 01/23/18 2307   98 8 °F (37 1 °C) 65 16 135/64 98 %      Temp Source Heart Rate Source Patient Position - Orthostatic VS BP Location FiO2 (%)   01/23/18 2307 -- 01/23/18 2307 01/23/18 2307 --   Oral  Lying Right arm       Pain Score       01/23/18 2301       Worst Possible Pain        Wt Readings from Last 1 Encounters:   01/23/18 110 kg (242 lb 8 1 oz)       Vital Signs (abnormal):      1-23 0721   TO  1-24  0510  Pain Score  5 3 4 2 3 8 8 4 7 Worst   Worst    8  5       Butalbital-APAP-Caffeine -40 mg (tablet) 1     HYDROmorphone IJ (mg)   0 2   0 2   0 5   0 5       Abnormal Labs/Diagnostic Test Results:     CTA head and neck w wo contrast   INDICATION: Recurrent thunderclap headaches  Small volume of subarachnoid hemorrhage at the vertex bilaterally right more so than left  CRP 3 2 (H) <3 0 mg/L         Past Medical/Surgical History:    Active Ambulatory Problems     Diagnosis Date Noted    Hypertension 01/22/2018    History of gastric bypass 01/23/2018    Constipation 01/23/2018    [facility] 01/23/2018       Past Medical History:    Anxiety    Hypertension    Tremor       Admitting Diagnosis: Acute intractable headache [R51]    Age/Sex: 47 y o  female    Assessment/Plan:    SAH (subarachnoid hemorrhage) ([facility] )   Assessment & Plan     Admitted to telemetry ,step-down 2  Neurochecks ,vital signs Q 1hrs  Pain management of headache according to adult pain protocol  Monitor blood pressure will initiate nicardipine drip and ICU evaluation if SBP persistently >150-155  Zofran IV Q4 hrs  Neurology consult ( discussed with neurologist Dr Char Johnson , no urgent intervention he will place additional imaging study by himself patient NPO PMN, frequent neuro  and vital signs checks , no urgent intervention)  Neurosurgery consult ( discussed with neurosurgeon on-call Jarad Gentile who revised imaging study by himself, no urgent intervention, will closely monitor patient, control blood pressure)  CT head  Additional imaging study( cerebral angiography) will be ordered by Neurology           Acute intractable headache   Assessment & Plan     Secondary to #1  Management as above          Accelerated hypertension   Assessment & Plan     Lisinopril  IV blood pressure Rx q 4 hours  Monitor blood pressure, will  start nicardipine if blood pressure persistently above 150/155          Obesity due to excess calories   Assessment & Plan     Will start low-calorie diet when acceptable   patient is NPO overnight          Hypertension   Assessment & Plan     See above          Constipation   Assessment & Plan     Avoid unnecessary strain, BM Rx ordered          History of gastric bypass   Assessment & Plan     Patient 110 kg    Low-calorie diet             Admission Orders:    BETA 2 GLYCOPROTEIN ANTIBODIES  ANTI SCLERODERMA ANTIBODY  SJOGRENS ANTIBODIES   CLOVIS SCREEN    RPR  LYME ANTIBODY   IR CEREBRAL  ANGIOGRAPHY    CT HEAD   CONSULT NEUROLOGY  CONSULT NEURO SURGERY   NEURO CHECKS Q1 HR   NPO     Scheduled Meds:   citalopram 40 mg Oral Daily With Breakfast   lisinopril 10 mg Oral Daily   magnesium sulfate 2 g Intravenous Q24H [address]   nicotine 1 patch Transdermal Daily   polyethylene glycol 17 g Oral Daily   senna-docusate sodium 2 tablet Oral Daily   verapamil 40 mg Oral Q8H [address]     Continuous Infusions:   sodium chloride 75 mL/hr Last Rate: 75 mL/hr (01/24/18 0635)     PRN Meds:   acetaminophen    butalbital-acetaminophen-caffeine    hydrALAZINE    HYDROcodone-acetaminophen    HYDROmorphone    HYDROmorphone    ondansetron

## 2018-01-24 NOTE — CASE MANAGEMENT
[place] MD Physician Addendum Internal Medicine  Discharge Summaries Date of Service: 1/23/2018  5:06 PM         []Hide copied text  []Hover for attribution information        Discharge- Nick Caceres 1965, 47 y o  female MRN: [id number]     Unit/Bed#: 2 [place] 214-01 Encounter: [id number]     Primary Care Provider: Shree Sawant MD   Date and time admitted to hospital: 1/22/2018  9:29 PM               Hypertension   Assessment & Plan     -was on Lisinopril and Verapamil has been added for RCVS          Intractable headache   Assessment & Plan     -as per neurology etiology is RCVS (reverisble cerebral vasoconstriction syndrome)  -CTA head: Small volume of subarachnoid hemorrhage at the vertex bilaterally right more so than left  No focal intracranial stenosis or aneurysm   No cause for the patient's subarachnoid hemorrhage   No significant carotid or vertebral stenosis  -cont Verapamil 40mg PO Q8H, started today   -as per my discussion with neuro attending, transfer to [place] as pt will need conventional angiogram          * [facility]arachnoid hemorrhage) ([facility] )   Assessment & Plan     -transfer to [place] for conventional angiogram to determine if there are any <3mm aneurysms                Consultations During Hospital Stay:  · Neurology     Procedures Performed:      · None     Significant Findings / Test Results:      · See narrative     Incidental Findings:   · See Narrative      Test Results Pending at Discharge (will require follow up): · None     Outpatient Tests Requested:  · N/A, being transferred to [place]     Complications:  None     Reason for Admission: Intractable headache     Hospital Course:      Nick Caceres is a 47 y o  female patient who originally presented to the hospital on 1/22/2018 due to Intractable headache as outlined in the H&P done on admission      Please see above list of diagnoses and related plan for additional information   In addition, pt will need NPO after MN for angiogram tomorrow  Pt will need repeat CT brain in AM to assess SAH  Keep SBP<160      Condition at Discharge: good      Discharge Day Visit / Exam:      * Please refer to separate progress note for these details *     Discharge instructions/Information to patient and family:   See after visit summary for information provided to patient and family        Provisions for Follow-Up Care:  See after visit summary for information related to follow-up care and any pertinent home health orders        Disposition:   [facility] Transfer to [facility] to [facility] SNF:   · Not Applicable to this Patient - Not Applicable to this Patient     Planned Readmission: yes, [facility]     Discharge Statement:  I spent 40 minutes discharging the patient  This time was spent on the day of discharge  I had direct contact with the patient on the day of discharge  Greater than 50% of the total time was spent examining patient, answering all patient questions, arranging and discussing plan of care with patient as well as directly providing post-discharge instructions    Additional time then spent on discharge activities      Discharge Medications:  See after visit summary for reconciled discharge medications provided to patient and family        ** Please Note: This note has been constructed using a voice recognition system **                                                Revision History

## 2018-01-24 NOTE — PLAN OF CARE

## 2018-01-24 NOTE — PLAN OF CARE
DISCHARGE PLANNING     Discharge to home or other facility with appropriate resources Progressing        DISCHARGE PLANNING - CARE MANAGEMENT     Discharge to post-acute care or home with appropriate resources Progressing        INFECTION - ADULT     Absence or prevention of progression during hospitalization Progressing        Knowledge Deficit     Patient/family/caregiver demonstrates understanding of disease process, treatment plan, medications, and discharge instructions Progressing        PAIN - ADULT     Verbalizes/displays adequate comfort level or baseline comfort level Progressing        Potential for Falls     Patient will remain free of falls Progressing

## 2018-01-24 NOTE — PROGRESS NOTES
[address] Internal Medicine Progress Note  Patient: Severo Saucier 47 y o  female   MRN: [id number]  PCP: Irene Morton MD  Unit/Bed#: [facility] 923-01 Encounter: [id number]  Date Of Visit: 01/24/18    Assessment:    Principal Problem:    [facility]  Active Problems:    Hypertension    History of gastric bypass    Constipation    Acute intractable headache    Obesity due to excess calories    Accelerated hypertension      Plan:    · Subarachnoid Hemorrhage -   · Possibly due to Reversible Cerebral Vasoconstrictive Syndrome vs  Vasculitis  · Evaluations -   · Patient has just undergone conventional angiogram to evaluate this further  · Neurosurgery following, but if no aneurysms identified then treatment would be predominantly medical   · Treatments -   · Stop fioricet and Celexa being weaned to 20 mg x 3 days then 10 mg daily x 3 days then stop  · Blood Pressure Control -   · See accelerated Hypertension below  · Goal is to keep SBP < 160  · Headache Control -   · PRN IV dilaudid  · Daily magnesium infusion x 3 days  · Avoid fioricet per neurosurgery  · Nausea Control - Add Reglan PRN (this may also help with headache)  · Accelerated Hypertension -   · Will maintain current blood pressure medication of lisinopril 10 mg daily and verapamil 40 mg every 8 hours  · Monitor blood pressures  · Intractable Headache - see headache above  Likely due to subarachnoid hemorrhage rather than other cause  Serial exams for headaches  · Constipation - Senna / docusate 2 tabs daily and Miralax 17g daily  Monitor for BMs  · History of Gastric Bypass - need to keep in mind when prescribing medications  · Morbid Obesity - weight loss  Noted is history of gastric bypass  · Nicotine Dependence - nicotine patch as needed  · Level of Care - Level 2 Stepdown  Maintain for now         VTE Pharmacologic Prophylaxis:   Pharmacologic: Pharmacologic VTE Prophylaxis contraindicated due to subarachnoid hemorrhage  Mechanical VTE Prophylaxis in Place: Yes    Patient Centered Rounds: I have performed bedside rounds with nursing staff today  Discussions with Specialists or Other Care Team Provider: Neurosurgery Ramona     Education and Discussions with Family / Patient: family member updated at bedside  Time Spent for Care: 30 minutes  More than 50% of total time spent on counseling and coordination of care as described above  Current Length of Stay: 1 day(s)    Current Patient Status: Inpatient   Certification Statement: The patient will continue to require additional inpatient hospital stay due to evaluation and treatment for subarachnoid hemorrhage  Discharge Plan / Estimated Discharge Date: To be determined based on clinical course  Code Status: Level 1 - Full Code      Subjective:   Patient complains of nausea after attempting to eat following angiogram / after dinner meal   The patient feels that the zofran only helped a little, but not much  She has persistent headache and is seen laying in dark room with family member at bedside  Objective:     Vitals:   Temp (24hrs), Av 3 °F (36 8 °C), Min:97 7 °F (36 5 °C), Max:98 8 °F (37 1 °C)    HR:  [55-67] 55  Resp:  [16-18] 16  BP: (131-201)/() 161/71  SpO2:  [95 %-99 %] 98 %  Body mass index is 39 14 kg/m²  Input and Output Summary (last 24 hours): Intake/Output Summary (Last 24 hours) at 18 1316  Last data filed at 18 0900   Gross per 24 hour   Intake              120 ml   Output                0 ml   Net              120 ml       Physical Exam:     Physical Exam   Constitutional: She is oriented to person, place, and time  Laying with cool cloth over head  HENT:   Mouth/Throat: Oropharynx is clear and moist  No oropharyngeal exudate  Eyes: Conjunctivae are normal  Pupils are equal, round, and reactive to light  Neck: No JVD present  Cardiovascular: Normal rate and regular rhythm      No murmur heard   Pulmonary/Chest: Effort normal and breath sounds normal  She has no wheezes  She has no rales  Abdominal: Soft  Bowel sounds are normal  She exhibits no distension  There is no tenderness  Musculoskeletal: She exhibits no edema or tenderness  Lymphadenopathy:     She has no cervical adenopathy  Neurological: She is alert and oriented to person, place, and time  No cranial nerve deficit  Motor 5/5 and symmetric  Vitals reviewed  Additional Data:     Labs:      Results from last 7 days  Lab Units 01/23/18  0549 01/22/18  2200   WBC Thousand/uL 3 20* 6 50   HEMOGLOBIN g/dL 14 7 15 4   HEMATOCRIT % 45 1 45 5   PLATELETS Thousands/uL 204 192   NEUTROS PCT %  --  76*   LYMPHS PCT %  --  16   MONOS PCT %  --  8   EOS PCT %  --  0       Results from last 7 days  Lab Units 01/23/18  0600 01/22/18  2200   SODIUM mmol/L 141 140   POTASSIUM mmol/L 3 9 3 7   CHLORIDE mmol/L 105 102   CO2 mmol/L 27 29   BUN mg/dL 10 11   CREATININE mg/dL 0 64 0 74   CALCIUM mg/dL 8 9 8 9   TOTAL PROTEIN g/dL  --  8 0   BILIRUBIN TOTAL mg/dL  --  0 40   ALK PHOS U/L  --  76   ALT U/L  --  31   AST U/L  --  19   GLUCOSE RANDOM mg/dL 134 130       Results from last 7 days  Lab Units 01/24/18  0742   INR  0 97       * I Have Reviewed All Lab Data Listed Above  * Additional Pertinent Lab Tests Reviewed:  [facility]    Imaging:    Imaging Reports Reviewed Today Include: CTA head and neck, CT head  Imaging Personally Reviewed by Myself Includes:  None    Recent Cultures (last 7 days):           Last 24 Hours Medication List:     [START ON 1/27/2018] citalopram 10 mg Oral Daily   [START ON 1/25/2018] citalopram 20 mg Oral Daily With Breakfast   lisinopril 10 mg Oral Daily   magnesium sulfate 2 g Intravenous Q24H [facility]   nicotine 1 patch Transdermal Daily   polyethylene glycol 17 g Oral Daily   senna-docusate sodium 2 tablet Oral Daily   verapamil 40 mg Oral Q8H [facility]        Today, Patient Was Seen By: Valentin Hameed, DO    ** Please Note: This note has been constructed using a voice recognition system   **

## 2018-01-24 NOTE — ANESTHESIA PREPROCEDURE EVALUATION
Review of Systems/Medical History  Patient summary reviewed  Chart reviewed  No history of anesthetic complications     Cardiovascular  Exercise tolerance: good,  Hypertension , No CAD, , No history of CABG, No cardiac stents  No angina ,    Pulmonary  No asthma: , No recent URI , No sleep apnea ,        GI/Hepatic            Endo/Other     GYN       Hematology   Musculoskeletal       Neurology    Headaches,    Psychology   Anxiety,              Physical Exam    Airway    Mallampati score: II  TM Distance: >3 FB       Dental   No notable dental hx     Cardiovascular      Pulmonary  Breath sounds clear to auscultation,     Other Findings      Lab Results   Component Value Date    WBC 3 20 (L) 01/23/2018    HGB 14 7 01/23/2018     01/23/2018     Lab Results   Component Value Date     01/23/2018    K 3 9 01/23/2018    BUN 10 01/23/2018    CREATININE 0 64 01/23/2018    GLUCOSE 134 01/23/2018     Lab Results   Component Value Date    PTT 27 01/24/2018      Lab Results   Component Value Date    INR 0 97 01/24/2018       No results found for: HGBA1C      Anesthesia Plan  ASA Score- 3     Anesthesia Type- IV sedation with anesthesia with ASA Monitors  Additional Monitors:   Airway Plan:     Comment: RONALD Chang , have personally seen and evaluated the patient prior to anesthetic care  I have reviewed the pre-anesthetic record, and other medical records if appropriate to the anesthetic care  If a CRNA is involved in the case, I have reviewed the CRNA assessment, if present, and agree  Risks/benefits and alternatives discussed with patient including possible PONV, sore throat, and possibility of rare anesthetic and surgical emergencies        Plan Factors-Patient not instructed to abstain from smoking on day of procedure  Patient did not smoke on day of surgery  Induction-     Postoperative Plan-     Informed Consent- Anesthetic plan and risks discussed with patient    I personally reviewed this patient with the CRNA  Discussed and agreed on the Anesthesia Plan with the MIGEUL Do

## 2018-01-24 NOTE — SOCIAL WORK
Cm met with patient to review role of Cm  Patient presented as alert during conversation  Patient stated that she lives with her wife, Suzi Lr, [phone] in a spilt level home with 2-3 steps to enter and 6 steps to 2nd floor bedroom/bathroom  Patient stated that she was independent with ADLS PTA  Patient denied having any inpatient PT/OT, inpatient MH, substance abuse treatment or [facility] services  Patient stated that pharmacy of choice is [facility] in [facility] [place]  Patient denied having any DME in the home  Patient PCP is associated with [facility] states seeing her PCP at [facility]  Patient reports prior to admissions, patient was employed full time and was driving independently  CM reviewed d/c planning process including the following: identifying help at home, patient preference for d/c planning needs, Discharge Lounge, [facility] Meds to Bed program, availability of treatment team to discuss questions or concerns patient and/or family may have regarding understanding medications and recognizing signs and symptoms once discharged  CM also encouraged patient to follow up with all recommended appointments after discharge  Patient advised of importance for patient and family to participate in managing patients medical well being

## 2018-01-24 NOTE — ASSESSMENT & PLAN NOTE
Admitted to telemetry ,step-down 2  Neurochecks ,vital signs Q 1hrs  Pain management of headache according to adult pain protocol  Monitor blood pressure will initiate nicardipine drip and ICU evaluation if SBP persistently >150-155  Zofran IV Q4 hrs  Neurology consult ( discussed with neurologist Dr James Arellano , no urgent intervention he will place additional imaging study by himself patient NPO PMN, frequent neuro  and vital signs checks , no urgent intervention)  Neurosurgery consult ( discussed with neurosurgeon on-call Concetta Deutsch who revised imaging study by himself, no urgent intervention, will closely monitor patient, control blood pressure)  CT head  Additional imaging study( cerebral angiography) will be ordered by Neurology

## 2018-01-24 NOTE — H&P
H&P- Pedro Martin 1965, 47 y o  female MRN: [id number]    Unit/Bed#: [facility] 923-01 Encounter: [id number]    Primary Care Provider: Sallie Rodriguez MD   Date and time admitted to hospital: 1/23/2018  9:57 PM        * SAH (subarachnoid hemorrhage) [facility])   Assessment & Plan    Admitted to telemetry ,step-down 2  Neurochecks ,vital signs Q 1hrs  Pain management of headache according to adult pain protocol  Monitor blood pressure will initiate nicardipine drip and ICU evaluation if SBP persistently >150-155  Zofran IV Q4 hrs  Neurology consult ( discussed with neurologist Dr Jennell Lombard , no urgent intervention he will place additional imaging study by himself patient NPO PMN, frequent neuro  and vital signs checks , no urgent intervention)  Neurosurgery consult ( discussed with neurosurgeon on-call Rosemarie Mahoney who revised imaging study by himself, no urgent intervention, will closely monitor patient, control blood pressure)  CT head  Additional imaging study( cerebral angiography) will be ordered by Neurology         Acute intractable headache   Assessment & Plan    Secondary to #1  Management as above        Accelerated hypertension   Assessment & Plan    Lisinopril  IV blood pressure Rx q 4 hours  Monitor blood pressure, will  start nicardipine if blood pressure persistently above 150/155        Obesity due to excess calories   Assessment & Plan    Will start low-calorie diet when acceptable   patient is NPO overnight        Hypertension   Assessment & Plan    See above        Constipation   Assessment & Plan    Avoid unnecessary strain, BM Rx ordered        History of gastric bypass   Assessment & Plan    Patient 110 kg    Low-calorie diet          VTE Prophylaxis: Pharmacologic VTE Prophylaxis contraindicated due to [facility]  / sequential compression device   Code Status: full  POLST: There is no POLST form on file for this patient (pre-hospital)  Discussion with family:  Female domestic partner    Anticipated Length of Stay:  Patient will be admitted on an Inpatient basis with an anticipated length of stay of  >2 midnights  Justification for Hospital Stay:  Further [address] workup neurology and neurosurgeon official consult in a m  Total Time for Visit, including Counseling / Coordination of Care: 45 minutes  Greater than 50% of this total time spent on direct patient counseling and coordination of care  Chief Complaint:   Intractable headache nausea sensitivity to light    History of Present Illness:    Arthur Mariscal is a 47 y o  female with history of obesity, gastric bypass, hypertension who presented originally to [facility] on 1/22/18 with intractable headache described as sudden onset of severe throbbing occipital headache, constant ,aggravated with noise and light, associated with nausea  Patient had at least 3- 4 episodes of similar headache over the past week, Tylenol with codeine and Fioricet partially relieved the pain  Blood pressure upon presentation to the emergency room 220/120, during that time she felt severe headache  Neurology  wanted to rule out are CVS and  ordered CTA head /neck which revealed Small volume of subarachnoid hemorrhage at the vertex bilaterally right more so than left  Patient was transferred to [facility] for neurology and neurosurgeon evaluation and cerebral angiogram for which she will be   NPO post midnight  She will need repeat CT brain in a m  keep SBP<155   Dr Milan Harris and Ying Torres made aware of case  Upon arrival to the floor high me immediately assessed the patient  Her female partner endorses that patient was informed to be transferred for cerebral angiogram stat  I reassured them that there is no need of imaging study to be done stat given no focal neurologic deficit or deterioration of general condition and  hemodynamics stability, partner and patient are agreeable  Patient will be seen by neurologist and neurosurgeon in a     Admitted on an inpatient basis to step-down 2 level      Review of Systems:    Review of Systems   Eyes: Positive for photophobia  Gastrointestinal: Positive for nausea  Neurological: Positive for headaches  Past Medical and Surgical History:     Past Medical History:   Diagnosis Date    Anxiety     Hypertension     Tremor        Past Surgical History:   Procedure Laterality Date    CHOLECYSTECTOMY      GASTRIC BYPASS         Meds/Allergies:    Prior to Admission medications    Medication Sig Start Date End Date Taking? Authorizing Provider   butalbital-acetaminophen-caffeine-codeine (FIORICET WITH CODEINE) -94-30 MG per capsule Take 1 capsule by mouth every 4 (four) hours as needed for headaches    Historical Provider, MD   citalopram (CeleXA) 40 mg tablet Take 40 mg by mouth daily with breakfast 12/17/17   Historical Provider, MD   lisinopril (ZESTRIL) 10 mg tablet Take 10 mg by mouth daily with breakfast 12/21/17   Historical Provider, MD   metoclopramide (REGLAN) 10 mg tablet Take 10 mg by mouth 3 (three) times a day as needed    Historical Provider, MD     I have reviewed home medications with a medical source (PCP, Pharmacy, other)      Allergies: No Known Allergies    Social History:     Marital Status: /Civil Union   Occupation: none  Patient Pre-hospital Living Situation: home  Patient Pre-hospital Level of Mobility: reg  Patient Pre-hospital Diet Restrictions: reg  Substance Use History:   History   Alcohol Use    Yes     History   Smoking Status    Light Tobacco Smoker   Smokeless Tobacco    Never Used     History   Drug Use No       Family History:    non-contributory    Physical Exam:     Vitals:   Blood Pressure: 148/78 (01/23/18 2322)  Pulse: 58 (01/23/18 2322)  Temperature: 98 2 °F (36 8 °C) (01/23/18 2322)  Temp Source: Oral (01/23/18 2322)  Respirations: 16 (01/23/18 2322)  Height: 5' 6" (167 6 cm) (01/23/18 2307)  Weight - Scale: 110 kg (242 lb 8 1 oz) (01/23/18 2307)  SpO2: 96 % (01/23/18 2322)    Physical Exam   Constitutional: No distress  HENT:   Head: Normocephalic  Eyes: Pupils are equal, round, and reactive to light  Neck: Normal range of motion  Cardiovascular: Regular rhythm  Pulmonary/Chest: No respiratory distress  Abdominal:   Obese soft nontender not distended   Musculoskeletal: She exhibits no edema  Neurological: She is alert  No cranial nerve deficit  Coordination normal    Skin: Skin is warm  Psychiatric: She has a normal mood and affect  Additional Data:     Lab Results: I have personally reviewed pertinent reports  Results from last 7 days  Lab Units 01/23/18  0549 01/22/18  2200   WBC Thousand/uL 3 20* 6 50   HEMOGLOBIN g/dL 14 7 15 4   HEMATOCRIT % 45 1 45 5   PLATELETS Thousands/uL 204 192   NEUTROS PCT %  --  76*   LYMPHS PCT %  --  16   MONOS PCT %  --  8   EOS PCT %  --  0       Results from last 7 days  Lab Units 01/23/18  0600 01/22/18  2200   SODIUM mmol/L 141 140   POTASSIUM mmol/L 3 9 3 7   CHLORIDE mmol/L 105 102   CO2 mmol/L 27 29   BUN mg/dL 10 11   CREATININE mg/dL 0 64 0 74   CALCIUM mg/dL 8 9 8 9   TOTAL PROTEIN g/dL  --  8 0   BILIRUBIN TOTAL mg/dL  --  0 40   ALK PHOS U/L  --  76   ALT U/L  --  31   AST U/L  --  19   GLUCOSE RANDOM mg/dL 134 130           Imaging: I have personally reviewed pertinent reports  CT head wo contrast    (Results Pending)           [employer] / [employer] Records Reviewed: Yes     ** Please Note: This note has been constructed using a voice recognition system   **

## 2018-01-24 NOTE — ASSESSMENT & PLAN NOTE
Lisinopril  IV blood pressure Rx q 4 hours  Monitor blood pressure, will  start nicardipine if blood pressure persistently above 150/155

## 2018-01-24 NOTE — PROGRESS NOTES
Progress Note - Neurology   Regina Paul 47 y o  female MRN: [id number]  Unit/Bed#: [facility] 923-01 Encounter: [id number]    Assessment:  1)  Intractable headache - potentially setting of reversible cerebral vasoconstrictive syndrome (RCVS) vs Vasculitis - (+) SAH on CTA  patient has had intractable headache since 01/15/2018, with 4 episodes of thunderclap headache with associated nausea, vomiting, photosensitivity and numbness and tingling of her left upper extremity  Laboratory evaluation (serum): Abnormal:    CRP: 3 2    WBC: 3 2     Normal:   ESR: 13   BMP   RPR    Beta-2 glycoprotein antibodies: pending  Anti-scleroderma antibody: pending  Sjogren's Antibodies: pending  CLOVIS: pending  Lyme Antibody: pending      2)  HTN  3)  Tremor - patient recently weaning off of propranolol secondary to bradycardia  Has an outpatient appointment at [address] [place] [address] on 03/15/2018  Plan:  1)  Conventional digital subtraction angiogram pending  2)  Repeat CT pending today  3)  Continue verapamil at current dose - pt has hx of bradycardia  Continue magnesium  4)  Continue weaning celexa  20mg PO x 3days, then 10mg PO Qdx3 days then stop  5)  Further recommendations to follow imaging and angio    Subjective:   Patient is a 64-year-old female with a past medical history of hypertension, tremor and obesity status post gastric bypass, who presents with a history of intractable headache starting on 01/15/18 with intermittent thunderclap headache that knocked me to my knees  The patient initially [facility] to [facility]  CTA revealed small area of subarachnoid hemorrhage at the vertex bilaterally R>L  The patient was transferred to [facility] for angiogram, which she should be receiving today  Laboratory evaluation thus far has been largely unremarkable, as detailed above      Patient reports that she has had relief from the headaches with Dilaudid, but that without medication her headache, which begins at the left occiput and radiates to her forehead, this continually a 3/10, with spikes of 10/10 pain  Reports associated photophobia, but denies nausea vomiting, numbness, weakness, ataxia, difficulty with speech or word-finding  No reported loss of consciousness  Nonfocal neurological exam as detailed below  ROS:  See Subjective    Medications:  Scheduled Meds:  citalopram 40 mg Oral Daily With Breakfast   lisinopril 10 mg Oral Daily   magnesium sulfate 2 g Intravenous Q24H HENRY   nicotine 1 patch Transdermal Daily   polyethylene glycol 17 g Oral Daily   senna-docusate sodium 2 tablet Oral Daily   verapamil 40 mg Oral Q8H [address]     Continuous Infusions:  sodium chloride 75 mL/hr Last Rate: 75 mL/hr (01/24/18 0635)     PRN Meds:   acetaminophen    butalbital-acetaminophen-caffeine    hydrALAZINE    HYDROcodone-acetaminophen    HYDROmorphone    HYDROmorphone    ondansetron      Vitals: Blood pressure 140/80, pulse 55, temperature 98 3 °F (36 8 °C), temperature source Oral, resp  rate 16, height 5' 6" (1 676 m), weight 110 kg (242 lb 8 1 oz), last menstrual period 12/21/2016, SpO2 98 %, not currently breastfeeding  ,Body mass index is 39 14 kg/m²  Physical Exam:  Physical Exam   Constitutional: She is oriented to person, place, and time  She appears well-developed and well-nourished  No distress  Obese     Eyes: Conjunctivae are normal  Right eye exhibits no discharge  Left eye exhibits no discharge  No scleral icterus  Neck: Normal range of motion  Neck supple  No tracheal deviation present  No thyromegaly present  Pulmonary/Chest: Effort normal  No respiratory distress  Musculoskeletal: Normal range of motion  She exhibits no edema, tenderness or deformity  Neurological: She is oriented to person, place, and time  She has normal strength  Reflex Scores:       Tricep reflexes are 2+ on the right side and 2+ on the left side         Bicep reflexes are 2+ on the right side and 2+ on the left side  Brachioradialis reflexes are 2+ on the right side and 2+ on the left side  Patellar reflexes are 2+ on the right side and 2+ on the left side  Achilles reflexes are 2+ on the right side and 2+ on the left side  Skin: Skin is warm and dry  No rash noted  She is not diaphoretic  No erythema  No pallor  Psychiatric: She has a normal mood and affect  Her speech is normal and behavior is normal    Nursing note and vitals reviewed  Neurologic Exam     Mental Status   Oriented to person, place, and time  Follows 3 step commands  Attention: normal  Concentration: normal    Speech: speech is normal   Level of consciousness: alert  Knowledge: good  Normal comprehension  Cranial Nerves   Cranial nerves II through XII intact  Motor Exam   Muscle bulk: normal  Overall muscle tone: normal    Strength   Strength 5/5 throughout  Sensory Exam   Light touch normal      Gait, Coordination, and Reflexes     Tremor   Resting tremor: absent    Reflexes   Right brachioradialis: 2+  Left brachioradialis: 2+  Right biceps: 2+  Left biceps: 2+  Right triceps: 2+  Left triceps: 2+  Right patellar: 2+  Left patellar: 2+  Right achilles: 2+  Left achilles: 2+  Right : 2+  Left : 2+  Right plantar: normal  Left plantar: normal  Right ankle clonus: absent  Left ankle clonus: present (1 beat )      Lab, Imaging and other studies: I have personally reviewed pertinent reports       Recent Results (from the past 24 hour(s))   Sedimentation rate, automated    Collection Time: 01/23/18  1:45 PM   Result Value Ref Range    Sed Rate 13 0 - 15 mm/hour   C-reactive protein    Collection Time: 01/23/18  1:45 PM   Result Value Ref Range    CRP 3 2 (H) <3 0 mg/L   RPR    Collection Time: 01/23/18  1:45 PM   Result Value Ref Range    RPR Non-Reactive Non-Reactive   APTT    Collection Time: 01/24/18  7:42 AM   Result Value Ref Range    PTT 27 23 - 35 seconds   Protime-INR Collection Time: 01/24/18  7:42 AM   Result Value Ref Range    Protime 12 9 12 1 - 14 4 seconds    INR 0 97 0 86 - 1 16   ]      VTE Prophylaxis: Sequential compression device (Venodyne)     Counseling / Coordination of Care  Total time spent today 35 minutes  Greater than 50% of total time was spent with the patient and / or family counseling and / or coordination of care  A description of the counseling / coordination of care: The patient was seen examined by myself the attending physician  The chart was reviewed thoroughly, including imaging and laboratory studies  The patient was counseled in the room

## 2018-01-25 ENCOUNTER — APPOINTMENT (INPATIENT)
Dept: RADIOLOGY | Facility: HOSPITAL | Age: 53
DRG: 069 | End: 2018-01-25
Payer: COMMERCIAL

## 2018-01-25 LAB
ANION GAP SERPL CALCULATED.3IONS-SCNC: 7 MMOL/L (ref 4–13)
B2 GLYCOPROT1 IGA SER-ACNC: <9 GPI IGA UNITS (ref 0–25)
B2 GLYCOPROT1 IGG SER-ACNC: <9 GPI IGG UNITS (ref 0–20)
B2 GLYCOPROT1 IGM SER-ACNC: <9 GPI IGM UNITS (ref 0–32)
BUN SERPL-MCNC: 7 MG/DL (ref 5–25)
CALCIUM SERPL-MCNC: 9.1 MG/DL (ref 8.3–10.1)
CHLORIDE SERPL-SCNC: 103 MMOL/L (ref 100–108)
CO2 SERPL-SCNC: 29 MMOL/L (ref 21–32)
CREAT SERPL-MCNC: 0.6 MG/DL (ref 0.6–1.3)
ERYTHROCYTE [DISTWIDTH] IN BLOOD BY AUTOMATED COUNT: 13.4 % (ref 11.6–15.1)
GFR SERPL CREATININE-BSD FRML MDRD: 106 ML/MIN/1.73SQ M
GLUCOSE SERPL-MCNC: 93 MG/DL (ref 65–140)
HCT VFR BLD AUTO: 43.9 % (ref 34.8–46.1)
HGB BLD-MCNC: 14.7 G/DL (ref 11.5–15.4)
MCH RBC QN AUTO: 32 PG (ref 26.8–34.3)
MCHC RBC AUTO-ENTMCNC: 33.5 G/DL (ref 31.4–37.4)
MCV RBC AUTO: 96 FL (ref 82–98)
PLATELET # BLD AUTO: 218 THOUSANDS/UL (ref 149–390)
PMV BLD AUTO: 10.4 FL (ref 8.9–12.7)
POTASSIUM SERPL-SCNC: 3.5 MMOL/L (ref 3.5–5.3)
RBC # BLD AUTO: 4.59 MILLION/UL (ref 3.81–5.12)
SODIUM SERPL-SCNC: 139 MMOL/L (ref 136–145)
WBC # BLD AUTO: 4.69 THOUSAND/UL (ref 4.31–10.16)

## 2018-01-25 PROCEDURE — 70553 MRI BRAIN STEM W/O & W/DYE: CPT

## 2018-01-25 PROCEDURE — 99024 POSTOP FOLLOW-UP VISIT: CPT | Performed by: RADIOLOGY

## 2018-01-25 PROCEDURE — 99232 SBSQ HOSP IP/OBS MODERATE 35: CPT | Performed by: INTERNAL MEDICINE

## 2018-01-25 PROCEDURE — 99232 SBSQ HOSP IP/OBS MODERATE 35: CPT | Performed by: NEUROLOGICAL SURGERY

## 2018-01-25 PROCEDURE — 99233 SBSQ HOSP IP/OBS HIGH 50: CPT | Performed by: NURSE PRACTITIONER

## 2018-01-25 PROCEDURE — 85027 COMPLETE CBC AUTOMATED: CPT | Performed by: INTERNAL MEDICINE

## 2018-01-25 PROCEDURE — A9585 GADOBUTROL INJECTION: HCPCS | Performed by: INTERNAL MEDICINE

## 2018-01-25 PROCEDURE — 80048 BASIC METABOLIC PNL TOTAL CA: CPT | Performed by: INTERNAL MEDICINE

## 2018-01-25 RX ORDER — OXYCODONE HYDROCHLORIDE 5 MG/1
5 TABLET ORAL EVERY 4 HOURS PRN
Status: DISCONTINUED | OUTPATIENT
Start: 2018-01-25 | End: 2018-01-28 | Stop reason: HOSPADM

## 2018-01-25 RX ORDER — HYDRALAZINE HYDROCHLORIDE 20 MG/ML
10 INJECTION INTRAMUSCULAR; INTRAVENOUS EVERY 4 HOURS PRN
Status: DISCONTINUED | OUTPATIENT
Start: 2018-01-25 | End: 2018-01-28 | Stop reason: HOSPADM

## 2018-01-25 RX ORDER — ACETAMINOPHEN 325 MG/1
975 TABLET ORAL EVERY 8 HOURS SCHEDULED
Status: DISCONTINUED | OUTPATIENT
Start: 2018-01-25 | End: 2018-01-28 | Stop reason: HOSPADM

## 2018-01-25 RX ORDER — VERAPAMIL HYDROCHLORIDE 80 MG/1
40 TABLET ORAL EVERY 12 HOURS
Status: DISCONTINUED | OUTPATIENT
Start: 2018-01-25 | End: 2018-01-27

## 2018-01-25 RX ADMIN — GADOBUTROL 11 ML: 604.72 INJECTION INTRAVENOUS at 21:54

## 2018-01-25 RX ADMIN — MAGNESIUM SULFATE HEPTAHYDRATE 2 G: 40 INJECTION, SOLUTION INTRAVENOUS at 08:41

## 2018-01-25 RX ADMIN — Medication 2 TABLET: at 08:40

## 2018-01-25 RX ADMIN — HYDROMORPHONE HYDROCHLORIDE 0.5 MG: 1 INJECTION, SOLUTION INTRAMUSCULAR; INTRAVENOUS; SUBCUTANEOUS at 17:59

## 2018-01-25 RX ADMIN — LISINOPRIL 10 MG: 10 TABLET ORAL at 08:45

## 2018-01-25 RX ADMIN — HYDROCODONE BITARTRATE AND ACETAMINOPHEN 1 TABLET: 5; 325 TABLET ORAL at 16:37

## 2018-01-25 RX ADMIN — ACETAMINOPHEN 975 MG: 325 TABLET ORAL at 22:21

## 2018-01-25 RX ADMIN — HYDROMORPHONE HYDROCHLORIDE 0.5 MG: 1 INJECTION, SOLUTION INTRAMUSCULAR; INTRAVENOUS; SUBCUTANEOUS at 06:44

## 2018-01-25 RX ADMIN — HYDROMORPHONE HYDROCHLORIDE 0.5 MG: 1 INJECTION, SOLUTION INTRAMUSCULAR; INTRAVENOUS; SUBCUTANEOUS at 04:07

## 2018-01-25 RX ADMIN — POLYETHYLENE GLYCOL 3350 17 G: 17 POWDER, FOR SOLUTION ORAL at 08:41

## 2018-01-25 RX ADMIN — CITALOPRAM HYDROBROMIDE 20 MG: 20 TABLET ORAL at 08:40

## 2018-01-25 NOTE — PROGRESS NOTES
[address] Internal Medicine Progress Note  Patient: Alan Johnson 47 y o  female   MRN: [id number]  PCP: France Henley MD  Unit/Bed#: [facility] 923-01 Encounter: [id number]  Date Of Visit: 01/25/18    Assessment:    Principal Problem:    [facility]  Active Problems:    Hypertension    History of gastric bypass    Constipation    Acute intractable headache    Obesity due to excess calories    Accelerated hypertension      Plan:    · Subarachnoid Hemorrhage -   · Possibly due to Reversible Cerebral Vasoconstrictive Syndrome vs  Vasculitis  · Evaluations -   · Procedure: Conventional angiogram on 1/24/2018 by Dr Caleb Garcia - minimal multifocal narrowing  Unclear etiology if atherosclerotic vs  Vasculopathy such as RCVS / Vasculitis  · With a normal ESR, vasculitic causes less likely now  Also noted is negative CLOVIS, Sjogren Ab, Scleroderma Ab  Will also get ANCA  · Per up to date, Vanillymandelic acid (VMA) and 5-HIAA levels may be helpful to rule out co-existant conditions when RCVS suspected  Will discuss with neurosurgery team    · Neurochecks can be scaled back to q4h  · Treatments -   · Stopped fioricet and Celexa being weaned to 20 mg x 3 days then 10 mg daily x 3 days then stop  · Blood Pressure Control -   · See accelerated Hypertension below  · Goal is to keep SBP < 160  · Headache Control -   · PRN IV dilaudid  · Daily magnesium infusion x 3 days  · Avoid fioricet per neurosurgery  · Monitor for headache symptoms  · Work with neurology to treat  · Nausea Control - Zofran and Reglan PRN (this may also help with headache)  Monitor nausea symptoms  · Accelerated Hypertension -   · Will maintain current blood pressure medication of lisinopril 10 mg daily and verapamil 40 mg every 8 hours  · Monitor blood pressures  · Intractable Headache - see headache above  Likely due to subarachnoid hemorrhage rather than other cause  Serial exams for headaches    · Constipation - Senna / docusate 2 tabs daily and Miralax 17g daily  Monitor for BMs  · History of Gastric Bypass - need to keep in mind when prescribing medications  · Morbid Obesity - weight loss  Noted is history of gastric bypass  · Nicotine Dependence - nicotine patch as needed  · Level of Care - Level 2 Stepdown  We can probably decrease status to med surg level of care  VTE Pharmacologic Prophylaxis:   Pharmacologic: Pharmacologic VTE Prophylaxis contraindicated due to subarachnoid hemorrhage  Mechanical VTE Prophylaxis in Place: Yes    Patient Centered Rounds: I have performed bedside rounds with nursing staff today  Discussions with Specialists or Other Care Team Provider: Will discuss again with Neurosurgery PAangelita     Education and Discussions with Family / Patient: updated patient and family at bedside  It was patient's wife that I updated yesterday  Time Spent for Care: 30 minutes  More than 50% of total time spent on counseling and coordination of care as described above  Current Length of Stay: 2 day(s)    Current Patient Status: Inpatient   Certification Statement: The patient will continue to require additional inpatient hospital stay due to evaluation and treatment for subarachnoid hemorrhage  Discharge Plan / Estimated Discharge Date: To be determined based on clinical course  Code Status: Level 1 - Full Code      Subjective: The patient has no acute complaints currently  Headache is improved, but not gone  It is down to a 2-3 / 10  The nausea is also improved today  She has no new vision changes  Intermittently will have some dizziness  She has no numbness or paresthesia  No focal weakness  Objective:     Vitals:   Temp (24hrs), Av 4 °F (36 9 °C), Min:97 6 °F (36 4 °C), Max:98 7 °F (37 1 °C)    HR:  [47-66] 47  Resp:  [16-19] 18  BP: (129-175)/(66-90) 146/80  SpO2:  [96 %-98 %] 97 %  Body mass index is 39 14 kg/m²       Input and Output Summary (last 24 hours): Intake/Output Summary (Last 24 hours) at 01/25/18 0854  Last data filed at 01/24/18 1942   Gross per 24 hour   Intake            957 5 ml   Output                0 ml   Net            957 5 ml       Physical Exam:     Physical Exam   Constitutional: She is oriented to person, place, and time  No distress  Much more awake and comfortable appearing today  HENT:   Mouth/Throat: Oropharynx is clear and moist  No oropharyngeal exudate  Eyes: Conjunctivae and EOM are normal  Pupils are equal, round, and reactive to light  No clear nystagmus  Cardiovascular: Normal rate and regular rhythm  No murmur heard  Pulmonary/Chest: Effort normal and breath sounds normal  She has no wheezes  She has no rales  Abdominal: Soft  Bowel sounds are normal  She exhibits no distension  There is no tenderness  Musculoskeletal: She exhibits no edema or tenderness  Neurological: She is alert and oriented to person, place, and time  No cranial nerve deficit  Motor 5/5 and symmetric  Psychiatric: She has a normal mood and affect  Vitals reviewed  Additional Data:     Labs:      Results from last 7 days  Lab Units 01/25/18  0517  01/22/18  2200   WBC Thousand/uL 4 69  < > 6 50   HEMOGLOBIN g/dL 14 7  < > 15 4   HEMATOCRIT % 43 9  < > 45 5   PLATELETS Thousands/uL 218  < > 192   NEUTROS PCT %  --   --  76*   LYMPHS PCT %  --   --  16   MONOS PCT %  --   --  8   EOS PCT %  --   --  0   < > = values in this interval not displayed      Results from last 7 days  Lab Units 01/25/18  0517  01/22/18  2200   SODIUM mmol/L 139  < > 140   POTASSIUM mmol/L 3 5  < > 3 7   CHLORIDE mmol/L 103  < > 102   CO2 mmol/L 29  < > 29   BUN mg/dL 7  < > 11   CREATININE mg/dL 0 60  < > 0 74   CALCIUM mg/dL 9 1  < > 8 9   TOTAL PROTEIN g/dL  --   --  8 0   BILIRUBIN TOTAL mg/dL  --   --  0 40   ALK PHOS U/L  --   --  76   ALT U/L  --   --  31   AST U/L  --   --  19   GLUCOSE RANDOM mg/dL 93  < > 130   < > = values in this interval not displayed  Results from last 7 days  Lab Units 01/24/18  0742   INR  0 97       * I Have Reviewed All Lab Data Listed Above  * Additional Pertinent Lab Tests Reviewed: All Labs Within Last 24 Hours Reviewed    Imaging:    Imaging Reports Reviewed Today Include: No new imaging  Imaging Personally Reviewed by Myself Includes:  None    Recent Cultures (last 7 days):           Last 24 Hours Medication List:     [START ON 1/27/2018] citalopram 10 mg Oral Daily   citalopram 20 mg Oral Daily With Breakfast   lisinopril 10 mg Oral Daily   magnesium sulfate 2 g Intravenous Q24H HENRY   polyethylene glycol 17 g Oral Daily   senna-docusate sodium 2 tablet Oral Daily   verapamil 40 mg Oral [facility]        Today, Patient Was Seen By: Monik Wahl DO    ** Please Note: This note has been constructed using a voice recognition system   **

## 2018-01-25 NOTE — CASE MANAGEMENT
Continued Stay Review    Date: 1/25    Vital Signs: /74 (BP Location: Left arm)   Pulse (!) 54   Temp 97 8 °F (36 6 °C) (Oral)   Resp 18   Ht 5' 6" (1 676 m)   Wt 110 kg (242 lb 8 1 oz)   LMP 12/21/2016   SpO2 98%   BMI 39 14 kg/m²     Medications:   Scheduled Meds:   [START ON 1/27/2018] citalopram 10 mg Oral Daily   citalopram 20 mg Oral Daily With Breakfast   lisinopril 10 mg Oral Daily   polyethylene glycol 17 g Oral Daily   senna-docusate sodium 2 tablet Oral Daily   verapamil 40 mg Oral Q8H [facility]     Continuous Infusions:    PRN Meds:   acetaminophen    hydrALAZINE    HYDROcodone-acetaminophen    HYDROmorphone    metoclopramide    nicotine    ondansetron    Abnormal Labs/Diagnostic Results:   No new    Age/Sex: 47 y o  female       Assessment:     Principal Problem:    SAH (subarachnoid hemorrhage) (HCC)  Active Problems:    Hypertension    History of gastric bypass    Constipation    Acute intractable headache    Obesity due to excess calories    Accelerated hypertension     Plan:     · Subarachnoid Hemorrhage -   ? Possibly due to Reversible Cerebral Vasoconstrictive Syndrome vs  Vasculitis  ? Evaluations -   ? Procedure: Conventional angiogram on 1/24/2018 by Dr Devonte Beck - minimal multifocal narrowing  Unclear etiology if atherosclerotic vs  Vasculopathy such as RCVS / Vasculitis  ? With a normal ESR, vasculitic causes less likely now  Also noted is negative CLOVIS, Sjogren Ab, Scleroderma Ab  Will also get ANCA  ? Per up to date, Vanillymandelic acid (VMA) and 5-HIAA levels may be helpful to rule out co-existant conditions when RCVS suspected  Will discuss with neurosurgery team    ? Neurochecks can be scaled back to q4h  ? Treatments -   ? Stopped fioricet and Celexa being weaned to 20 mg x 3 days then 10 mg daily x 3 days then stop  ? Blood Pressure Control -   ? See accelerated Hypertension below  ? Goal is to keep SBP < 160  ? Headache Control -   ? PRN IV dilaudid    ? Daily magnesium infusion x 3 days  ? Avoid fioricet per neurosurgery  ? Monitor for headache symptoms  ? Work with neurology to treat  ? Nausea Control - Zofran and Reglan PRN (this may also help with headache)  Monitor nausea symptoms  · Accelerated Hypertension -   ? Will maintain current blood pressure medication of lisinopril 10 mg daily and verapamil 40 mg every 8 hours  ? Monitor blood pressures  · Intractable Headache - see headache above  Likely due to subarachnoid hemorrhage rather than other cause  Serial exams for headaches  · Constipation - Senna / docusate 2 tabs daily and Miralax 17g daily  Monitor for BMs  · History of Gastric Bypass - need to keep in mind when prescribing medications  · Morbid Obesity - weight loss  Noted is history of gastric bypass  · Nicotine Dependence - nicotine patch as needed  · Level of Care - Level 2 Stepdown  We can probably decrease status to med surg level of care         VTE Pharmacologic Prophylaxis:   Pharmacologic: Pharmacologic VTE Prophylaxis contraindicated due to subarachnoid hemorrhage  Mechanical VTE Prophylaxis in Place: Yes    Current Length of Stay: 2 day(s)     Current Patient Status: Inpatient   Certification Statement: The patient will continue to require additional inpatient hospital stay due to evaluation and treatment for subarachnoid hemorrhage      Discharge Plan / Estimated Discharge Date: To be determined based on clinical course

## 2018-01-25 NOTE — SOCIAL WORK
Cm reviewed patient during care coordination rounds  Patient is not medically stable for d/c today  Patient anticipated for d/c home when medically stable

## 2018-01-25 NOTE — PROGRESS NOTES
Progress Note - Neurosurgery   Maximiliano Miller 47 y o  female MRN: [id number]  Unit/Bed#: [facility] 923-01 Encounter: [id number]    Assessment:  1  Intractable headache associated a traumatic subarachnoid hemorrhage most likely consistent with reversible cerebral vasoconstrictive syndrome (RCVS)  2  Depression on SSRI  3  B12 deficiency  4  Vitamin-D deficiency  5  Hypertension  6  Tremor     Plan:  · Neuro exam:  Nonfocal, GCS 15, alert and oriented x3  · Imaging reviewed personally with attending  ? CTA 1/23:  Bilateral vertex subarachnoid hemorrhage right greater than left  No focal intracranial stenosis or aneurysm identified  ? CT head 1/24:  Stable subarachnoid hemorrhage  ? IR cerebral angiography: Few focal areas of smooth narrowing most likely related to a vasculopathy such as reversible cerebral vasoconstriction syndrome  Primary CNS vasculitis felt unlikely as well as atherosclerosis however are not entirely excluded  · Continue medical management by primary team, SLIM  ? Maintain systolic blood pressure less than 140  ? Headache control:  May continue to use acetaminophen, Vicodin, Dilaudid  Per discussion with Neurology avoid Fioricet as caffeine component may increased risk for vasospas  ? Discussed with Dr Jarrod Farias  ? Symptomatic management of nausea  ? May initiate chemical DVT prophylaxis from neurosurgical standpoint  SCDs mobilization  · Appreciate neurology consultation  ? Continue verapamil  ? Weaned Celexa as SSRI may increase risk of RCVS  ? Headache management as above  ? Neurology will most likely request MRI MRA of the brain  Consider lumbar puncture  ? Neurology will continue to follow and treat RCVS   Recommend referral back to Neurosurgery for repeat angiogram to evaluate for resolution of RCVS   · No further neurosurgical intervention remainder of hospitalization  Call with questions or concerns    We will follow up outpatient after referral from Neurology  Subjective/Objective   Chief Complaint: "  I feel better today     Subjective:  Patient rates headache 3/10 today  Significantly improved since yesterday  Denies vision changes, weakness, numbness in upper extremities is she previously experience  Denies groin pain, lower extremity altered sensation  Objective:  Lying in bed, no obvious distress    I/O       01/23 0701 - 01/24 0700 01/24 0701 - 01/25 0700 01/25 0701 - 01/26 0700    P  O  120 180 220    I V  (mL/kg)  777 5 (7 1)     Total Intake(mL/kg) 120 (1 1) 957 5 (8 7) 220 (2)    Net +120 +957 5 +220           Unmeasured Urine Occurrence 2 x 4 x 1 x          Invasive Devices     Peripheral Intravenous Line            Peripheral IV 01/23/18 Right Hand 1 day                Physical Exam:  Vitals: Blood pressure 124/67, pulse 59, temperature 98 1 °F (36 7 °C), temperature source Oral, resp  rate 19, height 5' 6" (1 676 m), weight 110 kg (242 lb 8 1 oz), last menstrual period 12/21/2016, SpO2 99 %, not currently breastfeeding  ,Body mass index is 39 14 kg/m²  General appearance: alert, appears stated age, cooperative and no distress  Head: Normocephalic, without obvious abnormality, atraumatic  Eyes: EOMI, PERRL, acuity intact in all fields  Neck:  No nuchal rigidity  Groin:  Left groin without hematoma  Dressing clean dry and intact  Removed  Lungs: non labored breathing  Heart: regular heart rate  Neurologic:   Mental status: Alert, oriented x4, thought content appropriate  Cranial nerves: grossly intact (Cranial nerves II-XII)  Sensory: normal to light touch in bilateral upper and lower extremities without neglect  Motor: moving all extremities without focal weakness  5/5 bilateral upper and lower extremity  Reflexes: 3+ and symmetric bilateral patellar reflexes    Coordination: finger to nose normal bilaterally, no drift bilaterally      Lab Results:    Results from last 7 days  Lab Units 01/25/18  0517 01/23/18  0549 01/22/18 2200 WBC Thousand/uL 4 69 3 20* 6 50   HEMOGLOBIN g/dL 14 7 14 7 15 4   HEMATOCRIT % 43 9 45 1 45 5   PLATELETS Thousands/uL 218 204 192   NEUTROS PCT %  --   --  76*   MONOS PCT %  --   --  8       Results from last 7 days  Lab Units 01/25/18  0517 01/23/18  0600 01/22/18  2200   SODIUM mmol/L 139 141 140   POTASSIUM mmol/L 3 5 3 9 3 7   CHLORIDE mmol/L 103 105 102   CO2 mmol/L 29 27 29   BUN mg/dL 7 10 11   CREATININE mg/dL 0 60 0 64 0 74   CALCIUM mg/dL 9 1 8 9 8 9   TOTAL PROTEIN g/dL  --   --  8 0   BILIRUBIN TOTAL mg/dL  --   --  0 40   ALK PHOS U/L  --   --  76   ALT U/L  --   --  31   AST U/L  --   --  19   GLUCOSE RANDOM mg/dL 93 134 130               Results from last 7 days  Lab Units 01/24/18  0742   INR  0 97   PTT seconds 27       Imaging Studies: I have personally reviewed pertinent reports  and I have personally reviewed pertinent films in PACS    EKG, Pathology, and Other Studies: I have personally reviewed pertinent reports        VTE Pharmacologic Prophylaxis:  No neurosurgical contraindication to pharmacologic DVT prophylaxis    VTE Mechanical Prophylaxis: sequential compression device

## 2018-01-26 PROCEDURE — 99233 SBSQ HOSP IP/OBS HIGH 50: CPT | Performed by: PHYSICIAN ASSISTANT

## 2018-01-26 PROCEDURE — 86255 FLUORESCENT ANTIBODY SCREEN: CPT | Performed by: INTERNAL MEDICINE

## 2018-01-26 PROCEDURE — 99232 SBSQ HOSP IP/OBS MODERATE 35: CPT | Performed by: INTERNAL MEDICINE

## 2018-01-26 PROCEDURE — 99232 SBSQ HOSP IP/OBS MODERATE 35: CPT | Performed by: PHYSICIAN ASSISTANT

## 2018-01-26 RX ORDER — BISACODYL 10 MG
10 SUPPOSITORY, RECTAL RECTAL DAILY PRN
Status: DISCONTINUED | OUTPATIENT
Start: 2018-01-26 | End: 2018-01-28 | Stop reason: HOSPADM

## 2018-01-26 RX ORDER — MAGNESIUM SULFATE HEPTAHYDRATE 40 MG/ML
2 INJECTION, SOLUTION INTRAVENOUS DAILY
Status: CANCELLED | OUTPATIENT
Start: 2018-01-26

## 2018-01-26 RX ADMIN — ACETAMINOPHEN 975 MG: 325 TABLET ORAL at 05:57

## 2018-01-26 RX ADMIN — HYDROMORPHONE HYDROCHLORIDE 0.5 MG: 1 INJECTION, SOLUTION INTRAMUSCULAR; INTRAVENOUS; SUBCUTANEOUS at 04:10

## 2018-01-26 RX ADMIN — Medication 2 TABLET: at 09:09

## 2018-01-26 RX ADMIN — SODIUM CHLORIDE 500 MG: 900 INJECTION, SOLUTION INTRAVENOUS at 11:16

## 2018-01-26 RX ADMIN — VERAPAMIL HYDROCHLORIDE 40 MG: 80 TABLET, FILM COATED ORAL at 21:42

## 2018-01-26 RX ADMIN — CITALOPRAM HYDROBROMIDE 20 MG: 20 TABLET ORAL at 09:09

## 2018-01-26 RX ADMIN — SODIUM CHLORIDE 500 MG: 900 INJECTION, SOLUTION INTRAVENOUS at 22:54

## 2018-01-26 RX ADMIN — OXYCODONE HYDROCHLORIDE 5 MG: 5 TABLET ORAL at 03:19

## 2018-01-26 RX ADMIN — ACETAMINOPHEN 975 MG: 325 TABLET ORAL at 14:05

## 2018-01-26 RX ADMIN — ACETAMINOPHEN 975 MG: 325 TABLET ORAL at 21:43

## 2018-01-26 RX ADMIN — POLYETHYLENE GLYCOL 3350 17 G: 17 POWDER, FOR SOLUTION ORAL at 09:09

## 2018-01-26 NOTE — PROGRESS NOTES
Notified by RN that patient headache uncontrolled despite IV Dilaudid  Patient reporting forgetfulness with onset of her headache earlier this afternoon  She recently had CT scan yesterday which showed stable subarachnoid hemorrhage  She is scheduled for MRA likely tomorrow  Discussed with patient and family at bedside, will add Tylenol 975 mg every 8 hours scheduled  Will discontinue Norco and change it to oxycodone 5 mg every 4 hours as needed for moderate pain  Patient continues to use IV Dilaudid for severe and breakthrough pain every 2-4 hours  Will need to consider weaning the patient off of IV Dilaudid in the near future  Patient already started on verapamil and receiving IV mag for suspected vasospasms

## 2018-01-26 NOTE — MEDICAL STUDENT
MEDICAL STUDENT  Inpatient Progress Note for TRAINING ONLY  Not Part of Legal Medical Record       Progress Note - Zena Coburn 47 y o  female MRN: [id number]    Unit/Bed#: [facility] 923-01 Encounter: [id number]      Assessment:  46 yo F w/ PMH gastric bypass, HTN, obesity transferred from [facility] for evaluation of new onset intractable headaches and suspicion for reversible cerebal vasoconstrictive syndrome    Plan:  Intractable Headaches associated with SAH   -suspect reversible cerebral vasoconstrictive syndrome (RCVS) vs less likely vasculitis given lack of auto-immune history and so far negative auto-immunity workup  -CTA head 1/23: small volume subarachnoid hemorrhage at the vertex b/l R>L; no focal intracranial stenosis or aneurysm - no obvious cause for [facility]  -IR cerebral angiography 1/24: few focal areas of smooth narrowing most likely related to vasculopathy such as RCVS, unlikely atherosclerosis or primary CNS vasculitis  -MRI brain yesterday: scattered areas of subarachnoid hemorrhage  -ANCA pending  -normal ESR, CRP; also negative Beta 2 glyco 1 IgG,SS Ab, Scl Ab, CLOVIS, RPR, Lyme Ab  -goal BP <140  -continue to wean Celexa, scheduled completion on 1/30  -valproic acid 500 mg IV q12 started this AM   -Verapamil decreased to 40 mg bid from q8h, as patient was bradycardic overnight but HR in 70s this AM; continue to monitor HR if persistently bradycardic, consider switch to nimodipine or nifedipine  -PRN IV dilaudid, daily magnesium infusions  -continue Acetaminophen and Vicodin, oxycodone 5 mg q4h  -Zofran and Reglan for nausea  -no surgical intervention for [facility] per neurosurgery  -MRA pending  -Serial neuro exams q 4h  -avoid Fioricet, nicotine, caffeine, SSRI as may trigger vasospasms  Hypertension  -lisinopril 10 mg daily to hold if SBP less than 110, verapamil 40 mg q12h, PRN hydralazine  --140s over last 24 hours; HR on average in the 50s  -goal BP less than 140, continue to monitor  Constipation  -senna and docusate and miralax as ordered  -no BM registered in last couple days  Hx of Gastric Bypass  Nicotine dependence  -hold nicotine patches as can worsen or contribute to cerebral vasculature constriction        Subjective:   Forgetfulness reported last night with onset of headache around dinner time  Patient reports today that this morning while reading a magazine, she had trouble understanding what she was reading and could not understand the word "not " She reports that she has had a low level headache throughout the day today that has been around a 2-3/10  She denies symptoms of bradycardia last night while she was sleeping  She endorses feelings of imbalance when she got up to use the bathroom but denies lightheadedness, blurry vision, focal sensory or motor deficits, difficulty with word finding or speech deficits  Denies nausea, able to eat today  Still no BM, patient reports constipation is new for her  Verapamil was held this AM for BP less than 50  Patient required Dilaudid and oxycodone overnight  Objective:     Vitals: Blood pressure 143/88, pulse 70, temperature 98 6 °F (37 °C), resp  rate 16, height 5' 6" (1 676 m), weight 110 kg (242 lb 8 1 oz), last menstrual period 12/21/2016, SpO2 98 %, not currently breastfeeding  ,Body mass index is 39 14 kg/m²  Intake/Output Summary (Last 24 hours) at 01/26/18 1043  Last data filed at 01/26/18 0546   Gross per 24 hour   Intake              680 ml   Output                0 ml   Net              680 ml       Physical Exam: /88 (BP Location: Left arm)   Pulse 70   Temp 98 6 °F (37 °C)   Resp 16   Ht 5' 6" (1 676 m)   Wt 110 kg (242 lb 8 1 oz)   LMP 12/21/2016   SpO2 98%   BMI 39 14 kg/m²   General appearance: alert and oriented, in no acute distress  Head: Normocephalic, without obvious abnormality, atraumatic  Nose: no discharge  Back: symmetric, no curvature  ROM normal  No CVA tenderness    Lungs: clear to auscultation bilaterally  Chest wall: no tenderness  Heart: regular rate and rhythm, S1, S2 normal, no murmur, click, rub or gallop  Abdomen: soft, non-tender; bowel sounds normal; no masses,  no organomegaly  Extremities: extremities normal, warm and well-perfused; no cyanosis, clubbing, or edema  Skin: Skin color, texture, turgor normal  No rashes or lesions  Neurologic: Sensory: normal  Motor: grossly normal  Coordination: test for rapid alternating movements normalno issues with finger to nose or heel to shin test      Invasive Devices     Peripheral Intravenous Line            Peripheral IV 01/23/18 Right Hand 2 days                Lab, Imaging and other studies: I have personally reviewed pertinent reports      VTE Pharmacologic Prophylaxis: Reason for no pharmacologic prophylaxis CNS bleed  VTE Mechanical Prophylaxis: sequential compression device

## 2018-01-26 NOTE — PROGRESS NOTES
[facility] Internal Medicine Progress Note  Patient: Severo Saucier 47 y o  female   MRN: [id number]  PCP: Irene Morton MD  Unit/Bed#: [facility] 923-01 Encounter: [id number]  Date Of Visit: 01/26/18    Assessment:    Principal Problem:    SAH (subarachnoid hemorrhage) ([facility] [address] )  Active Problems:    Hypertension    History of gastric bypass    Constipation    Acute intractable headache    Obesity due to excess calories    Accelerated hypertension      Plan:    · Subarachnoid Hemorrhage -   · Possibly due to Reversible Cerebral Vasoconstrictive Syndrome vs  Vasculitis  · Evaluations -   · Procedure: Conventional angiogram on 1/24/2018 by Dr Jennings Mail - minimal multifocal narrowing  Unclear etiology if atherosclerotic vs  Vasculopathy such as RCVS / Vasculitis  · With a normal ESR, vasculitic causes less likely now  Also noted is negative CLOVIS, Sjogren Ab, Scleroderma Ab  Follow up ANCA  · Per up to date, Vanillymandelic acid (VMA) and 5-HIAA levels may be helpful to rule out co-existant conditions when RCVS suspected  Will discuss with neurology team    · Neurochecks q4h  · Treatments -   · Stopped fioricet and Celexa being weaned  Down to 10 mg daily x 3 days then stop  · Blood Pressure Control -   · See accelerated Hypertension below  · Goal is to keep SBP < 160  · Headache Control -   · Depacon 500 mg IV q12h starting today  · PRN IV dilaudid  · Daily magnesium infusion x 3 days  · Avoid fioricet per neurosurgery  · Monitor for headache symptoms  · Work with neurology to treat  · Nausea Control - Zofran and Reglan PRN (this may also help with headache)  Monitor nausea symptoms  · No neurosurgical interventions  · Accelerated Hypertension -   · Will maintain current blood pressure medication of lisinopril 10 mg daily and verapamil 40 mg every 8 hours  · Monitor blood pressures  · Bradycardia -   · Scaling back on verapamil  Decreased on 1/25/2018 from tid to bid dosing    If still with bradycardia episodes, may need to change to just daily  Maintain telemetry and monitor HRs  · Intractable Headache - see headache above  Likely due to subarachnoid hemorrhage rather than other cause  Serial exams for headaches  · Constipation - Senna / docusate 2 tabs daily and Miralax 17g daily  Add PRN Dulcolax  Monitor for BMs  · History of Gastric Bypass - need to keep in mind when prescribing medications  · Morbid Obesity - weight loss  Noted is history of gastric bypass  · Nicotine Dependence - nicotine patch as needed  VTE Pharmacologic Prophylaxis:   Pharmacologic: Pharmacologic VTE Prophylaxis contraindicated due to subarachnoid hemorrhage  Mechanical VTE Prophylaxis in Place: Yes    Patient Centered Rounds: I have performed bedside rounds with nursing staff today  Discussions with Specialists or Other Care Team Provider: Will discuss with neurology       Education and Discussions with Family / Patient: patient's wife updated at bedside  Time Spent for Care: 30 minutes  More than 50% of total time spent on counseling and coordination of care as described above  Current Length of Stay: 3 day(s)    Current Patient Status: Inpatient   Certification Statement: The patient will continue to require additional inpatient hospital stay due to evaluation and treatment for subarachnoid hemorrhage  Discharge Plan / Estimated Discharge Date: To be determined based on clinical course  Code Status: Level 1 - Full Code      Subjective:   Mild headache again today  Since the depakote, she has had a little more dizziness noted after she showered today  This was also the longest she had been up at a single span  Objective:     Vitals:   Temp (24hrs), Av 4 °F (36 9 °C), Min:98 °F (36 7 °C), Max:98 9 °F (37 2 °C)    HR:  [50-70] 70  Resp:  [16-19] 16  BP: (104-146)/(57-88) 143/88  SpO2:  [97 %-99 %] 98 %  Body mass index is 39 14 kg/m²       Input and Output Summary (last 24 hours): Intake/Output Summary (Last 24 hours) at 01/26/18 1409  Last data filed at 01/26/18 1055   Gross per 24 hour   Intake             1040 ml   Output                0 ml   Net             1040 ml       Physical Exam:     Physical Exam   Constitutional: She is oriented to person, place, and time  Continues to appear more awake and comfortable  HENT:   Mouth/Throat: Oropharynx is clear and moist  No oropharyngeal exudate  Eyes: Conjunctivae and EOM are normal  Pupils are equal, round, and reactive to light  No clear nystagmus  Cardiovascular: Normal rate and regular rhythm  No murmur heard  Pulmonary/Chest: Effort normal and breath sounds normal  She has no wheezes  She has no rales  Abdominal: Soft  Bowel sounds are normal  She exhibits no distension  There is no tenderness  Musculoskeletal: She exhibits no edema or tenderness  Neurological: She is alert and oriented to person, place, and time  No cranial nerve deficit  Motor 5/5 and symmetric  Psychiatric: She has a normal mood and affect  Vitals reviewed  Additional Data:     Labs:      Results from last 7 days  Lab Units 01/25/18  0517  01/22/18  2200   WBC Thousand/uL 4 69  < > 6 50   HEMOGLOBIN g/dL 14 7  < > 15 4   HEMATOCRIT % 43 9  < > 45 5   PLATELETS Thousands/uL 218  < > 192   NEUTROS PCT %  --   --  76*   LYMPHS PCT %  --   --  16   MONOS PCT %  --   --  8   EOS PCT %  --   --  0   < > = values in this interval not displayed      Results from last 7 days  Lab Units 01/25/18  0517  01/22/18  2200   SODIUM mmol/L 139  < > 140   POTASSIUM mmol/L 3 5  < > 3 7   CHLORIDE mmol/L 103  < > 102   CO2 mmol/L 29  < > 29   BUN mg/dL 7  < > 11   CREATININE mg/dL 0 60  < > 0 74   CALCIUM mg/dL 9 1  < > 8 9   TOTAL PROTEIN g/dL  --   --  8 0   BILIRUBIN TOTAL mg/dL  --   --  0 40   ALK PHOS U/L  --   --  76   ALT U/L  --   --  31   AST U/L  --   --  19   GLUCOSE RANDOM mg/dL 93  < > 130   < > = values in this interval not displayed  Results from last 7 days  Lab Units 01/24/18  0742   INR  0 97       * I Have Reviewed All Lab Data Listed Above  * Additional Pertinent Lab Tests Reviewed: All Labs Within Last 24 Hours Reviewed    Imaging:    Imaging Reports Reviewed Today Include: No new imaging  Imaging Personally Reviewed by Myself Includes:  None    Recent Cultures (last 7 days):           Last 24 Hours Medication List:     acetaminophen 975 mg Oral Q8H [address]   [START ON 1/27/2018] citalopram 10 mg Oral Daily   lisinopril 10 mg Oral Daily   polyethylene glycol 17 g Oral Daily   senna-docusate sodium 2 tablet Oral Daily   valproate sodium 500 mg Intravenous Q12H   verapamil 40 mg Oral Q12H        Today, Patient Was Seen By: Omaira Meier DO    ** Please Note: This note has been constructed using a voice recognition system   **

## 2018-01-26 NOTE — PROGRESS NOTES
Progress Note - Neurology   Jalen Castrejon 47 y o  female MRN: [id number]  [facility] Encounter: [id number]    Assessment:  Bettina Beckman is a 47 y o  female with past medical history of HTN, gastric bypass surgery, tremors, vitamin b12 and D deficiency who presented to [facility] no 1/22/18 with recurrent thunderclap headaches with underlying intractable headache since 1/15/18  CT head (+) for small volume SAH  Aniography supports diagnosis of reversible cerebral vasoconstriction syndrome (RVCS)  Plan:  1  Intractable headache with recurrent thunderclap headache  -Likely secondary to reversible cerebral vasoconstriction syndrome with suggestive angiogram and MRI brain as below  -Depacon 500mg IV Q12 started this AM for intractable headache  -MRI brain showed scattered areas of subarachnoid hemorrhage over the vertices of the brain correlating to prior studies, potentially sequela associated with reversible cerebral vasoconstriction syndrome, especially in light of the angiographic findings  No diffusion restriction  -ANCA pending  -RPR, CLOVIS, Lyme antibodies negative, Sjogren's <0 2, Beta-2 Glyco 1 IgG <0 9, Scleroderma SCL-70 <0 2, ESR 13, CRP 3 2   -Celexa continuing to be weaned   -Verapamil decreased to 40mg BID, bradycardic overnight, but HR in 70s this AM  -Continue to avoid vasoactive substances    2  SAH  -Likely associated with RVCS  -Small volume demonstrated on CT head  MRI brain showed SAH correlated with prior studies   -Neurosurgery with no further surgical intervention    Subjective: Today patient is seen resting comfortable in bed with wife at bedside  States her headache is a 2-3/10  States IV Dilaudid is the only thing that provides relief for breakthrough  Per chart review, patient was forgetful last night associated with headache  Patient states this morning she had a difficult time reading/concentration for a brief period, but resolved quickly   Reports some lightheadedness this morning along with nausea, but no vomiting  Denies CP, SOB, vision changes, abdominal pain, weakness or numbness  ROS:  12 point ROS performed, as stated above, all others negative  Scheduled Meds:  acetaminophen 975 mg Oral Q8H [address]   [START ON 1/27/2018] citalopram 10 mg Oral Daily   lisinopril 10 mg Oral Daily   polyethylene glycol 17 g Oral Daily   senna-docusate sodium 2 tablet Oral Daily   valproate sodium 500 mg Intravenous Q12H   verapamil 40 mg Oral Q12H     Continuous Infusions:   PRN Meds: hydrALAZINE    HYDROmorphone    HYDROmorphone    metoclopramide    nicotine    ondansetron    oxyCODONE      Vitals: Blood pressure 143/88, pulse 70, temperature 98 6 °F (37 °C), resp  rate 16, height 5' 6" (1 676 m), weight 110 kg (242 lb 8 1 oz), last menstrual period 12/21/2016, SpO2 98 %, not currently breastfeeding  ,Body mass index is 39 14 kg/m²  Physical Exam:  Physical Exam   Constitutional: She is oriented to person, place, and time  She appears well-developed and well-nourished  No distress  HENT:   Head: Normocephalic and atraumatic  Eyes: EOM are normal  Pupils are equal, round, and reactive to light  Neck: Normal range of motion  Neck supple  Cardiovascular: Normal rate and regular rhythm  Pulmonary/Chest: Effort normal and breath sounds normal    Abdominal: Soft  Bowel sounds are normal    Neurological: She is oriented to person, place, and time  She has a normal Finger-Nose-Finger Test    Reflex Scores:       Tricep reflexes are 2+ on the right side and 2+ on the left side  Bicep reflexes are 2+ on the right side and 2+ on the left side  Brachioradialis reflexes are 2+ on the right side and 2+ on the left side  Patellar reflexes are 2+ on the right side and 2+ on the left side  Achilles reflexes are 2+ on the right side and 2+ on the left side  Skin: Skin is warm and dry  She is not diaphoretic  Psychiatric: She has a normal mood and affect   Her speech is normal and behavior is normal  Judgment and thought content normal      Neurologic Exam     Mental Status   Oriented to person, place, and time  Attention: normal  Concentration: normal    Speech: speech is normal   Level of consciousness: alert    Cranial Nerves     CN II   Visual fields full to confrontation  CN III, IV, VI   Pupils are equal, round, and reactive to light  Extraocular motions are normal    Nystagmus: none   Diplopia: none  Upgaze: normal  Downgaze: normal  Conjugate gaze: present    CN V   Facial sensation intact  CN VII   Facial expression full, symmetric  CN VIII   CN VIII normal      CN IX, X   CN IX normal      CN XI   CN XI normal      CN XII   CN XII normal      Motor Exam   Right arm pronator drift: absent  Left arm pronator drift: absent    Strength   Strength 5/5 except as noted  4+/5 left iliopsoas      Sensory Exam   Light touch normal    Vibration normal      Gait, Coordination, and Reflexes     Coordination   Finger to nose coordination: normal    Tremor   Resting tremor: absent    Reflexes   Right brachioradialis: 2+  Left brachioradialis: 2+  Right biceps: 2+  Left biceps: 2+  Right triceps: 2+  Left triceps: 2+  Right patellar: 2+  Left patellar: 2+  Right achilles: 2+  Left achilles: 2+  Right : 2+  Left : 2+  Right plantar: normal  Left plantar: normal  Gait deferred  Lab Results: I have personally reviewed pertinent reports  No results found for this or any previous visit (from the past 24 hour(s))  Imaging Studies: I have personally reviewed pertinent reports and I have personally reviewed pertinent films in PACS  MRI brain w and wo contrast 1/25/18: IMPRESSION:   Scattered areas of subarachnoid hemorrhage over the vertices of the brain correlating to prior studies, potentially sequela associated with reversible cerebral vasoconstriction syndrome, especially in light of the angiographic findings  No diffusion restriction      IR cerebral angiography 1/24/18: IMPRESSION:   Few focal areas of smooth narrowing most likely related to a vasculopathy such as reversible cerebral vasoconstriction syndrome  Primary CNS vasculitis felt unlikely as well as atherosclerosis however are not entirely excluded  EKG, Pathology, and Other Studies: I have personally reviewed pertinent reports      VTE Prophylaxis: Reason for no pharmacologic prophylaxis SAH

## 2018-01-27 ENCOUNTER — APPOINTMENT (INPATIENT)
Dept: RADIOLOGY | Facility: HOSPITAL | Age: 53
DRG: 069 | End: 2018-01-27
Payer: COMMERCIAL

## 2018-01-27 PROCEDURE — 99233 SBSQ HOSP IP/OBS HIGH 50: CPT | Performed by: PHYSICIAN ASSISTANT

## 2018-01-27 PROCEDURE — 70544 MR ANGIOGRAPHY HEAD W/O DYE: CPT

## 2018-01-27 PROCEDURE — 99232 SBSQ HOSP IP/OBS MODERATE 35: CPT | Performed by: INTERNAL MEDICINE

## 2018-01-27 RX ORDER — VERAPAMIL HYDROCHLORIDE 80 MG/1
40 TABLET ORAL DAILY
Status: DISCONTINUED | OUTPATIENT
Start: 2018-01-28 | End: 2018-01-28 | Stop reason: HOSPADM

## 2018-01-27 RX ADMIN — SODIUM CHLORIDE 500 MG: 900 INJECTION, SOLUTION INTRAVENOUS at 11:50

## 2018-01-27 RX ADMIN — Medication 2 TABLET: at 08:18

## 2018-01-27 RX ADMIN — CITALOPRAM HYDROBROMIDE 10 MG: 10 TABLET ORAL at 08:18

## 2018-01-27 RX ADMIN — ACETAMINOPHEN 975 MG: 325 TABLET ORAL at 13:15

## 2018-01-27 RX ADMIN — LISINOPRIL 10 MG: 10 TABLET ORAL at 08:17

## 2018-01-27 RX ADMIN — SODIUM CHLORIDE 500 MG: 900 INJECTION, SOLUTION INTRAVENOUS at 22:46

## 2018-01-27 RX ADMIN — OXYCODONE HYDROCHLORIDE 5 MG: 5 TABLET ORAL at 23:25

## 2018-01-27 RX ADMIN — ACETAMINOPHEN 975 MG: 325 TABLET ORAL at 05:02

## 2018-01-27 RX ADMIN — ACETAMINOPHEN 975 MG: 325 TABLET ORAL at 22:45

## 2018-01-27 RX ADMIN — VERAPAMIL HYDROCHLORIDE 40 MG: 80 TABLET, FILM COATED ORAL at 08:16

## 2018-01-27 NOTE — PROGRESS NOTES
[facility] Internal Medicine Progress Note  Patient: Tevin Lin 47 y o  female   MRN: [id number]  PCP: Sheri Cespedes MD  Unit/Bed#: [facility] 923-01 Encounter: [id number]  Date Of Visit: 01/27/18    Assessment:    Principal Problem:    SAH (subarachnoid hemorrhage) ([facility] [address] )  Active Problems:    Hypertension    History of gastric bypass    Constipation    Acute intractable headache    Obesity due to excess calories    Accelerated hypertension      Plan:    · Subarachnoid Hemorrhage -   · Possibly due to Reversible Cerebral Vasoconstrictive Syndrome vs  Vasculitis  · Evaluations -   · Procedure: Conventional angiogram on 1/24/2018 by Dr Castrejon Bone - minimal multifocal narrowing  Unclear etiology if atherosclerotic vs  Vasculopathy such as RCVS / Vasculitis  · With a normal ESR, vasculitic causes less likely now  Also noted is negative CLOVIS, Sjogren Ab, Scleroderma Ab, ANCA  · Per up to date, Vanillymandelic acid (VMA) and 5-HIAA levels may be helpful to rule out co-existant conditions when RCVS suspected  Will discuss with neurology team    · Neurochecks can be changed to PRN  Patient is stable  · Treatments -   · Stopped fioricet and Celexa being weaned and will be stopped after dose on 1/29/2018  · Blood Pressure Control -   · See accelerated Hypertension below  · Goal is to keep SBP < 160  · Headache Control -   · Depacon 500 mg IV q12h started 1/26/2018  Will change to Depakote  mg daily in discharge  · Continue scheduled tylenol  Will change to 500 mg x 2 tabs on discharge  Prefers not to use oxycodone as it did not help before  · PRN IV dilaudid  · Daily magnesium infusion x 3 days  · Avoid fioricet per neurosurgery  · Monitor for headache symptoms  · Work with neurology to treat  · Nausea Control - Zofran and Reglan PRN (this may also help with headache)  Monitor nausea symptoms  · No neurosurgical interventions  · PT eval for stairs    Nursing to walk with patient in halls   · Will follow up as outpatient with Dr Sandi Vasquez  · Accelerated Hypertension -   · Will maintain current blood pressure medication of lisinopril 10 mg daily and decrease verapamil to 40 mg daily  · Monitor blood pressures  · Bradycardia -   · Scaling back on verapamil  Decreased on 1/25/2018 from tid to bid dosing  If still with bradycardia episodes, may need to change to just daily  Maintain telemetry and monitor HRs  · Intractable Headache - see headache above  Likely due to subarachnoid hemorrhage rather than other cause  Serial exams for headaches  · Constipation - Senna / docusate 2 tabs daily and Miralax 17g daily  PRN Dulcolax  Monitor for BMs  · History of Gastric Bypass - need to keep in mind when prescribing medications  · Morbid Obesity - weight loss  Noted is history of gastric bypass  · Nicotine Dependence - nicotine patch as needed  VTE Pharmacologic Prophylaxis:   Pharmacologic: Pharmacologic VTE Prophylaxis contraindicated due to subarachnoid hemorrhage  Mechanical VTE Prophylaxis in Place: Yes    Patient Centered Rounds: I have performed bedside rounds with nursing staff today  Discussions with Specialists or Other Care Team Provider: Dr Mahlon Angelucci  Education and Discussions with Family / Patient: patient's wife and additional family members updated at bedside  Time Spent for Care: 30 minutes  More than 50% of total time spent on counseling and coordination of care as described above  Current Length of Stay: 4 day(s)    Current Patient Status: Inpatient   Certification Statement: The patient will continue to require additional inpatient hospital stay due to evaluation and treatment for subarachnoid hemorrhage  Discharge Plan / Estimated Discharge Date: Likely discharge tomorrow morning  Code Status: Level 1 - Full Code      Subjective: The patient states that her headaches are 2/10  She does not recall any major headaches since Thursday    She has had no new vision issues and denies any dizziness today  The patient denies any new focal weakness or sensory deficits  The patient denies any nausea or vomiting  The patient has some concerns about going home due to the fact she does have some steps and also has not really been up much aside from going from the bed to the bathroom  Therefore, we will maintain her in the hospital overnight tonight and ask nursing to walk with her and will also ask Physical therapy to see her to work on stairs with her tomorrow  If she does well, she will discharge tomorrow  Objective:     Vitals:   Temp (24hrs), Av 1 °F (36 7 °C), Min:98 °F (36 7 °C), Max:98 2 °F (36 8 °C)    HR:  [56-63] 56  Resp:  [16] 16  BP: (116-166)/(58-88) 116/58  SpO2:  [97 %-98 %] 97 %  Body mass index is 39 14 kg/m²  Input and Output Summary (last 24 hours): Intake/Output Summary (Last 24 hours) at 18 1204  Last data filed at 18 0900   Gross per 24 hour   Intake             1440 ml   Output                0 ml   Net             1440 ml       Physical Exam:     Physical Exam   Constitutional: She is oriented to person, place, and time  No distress  HENT:   Mouth/Throat: Oropharynx is clear and moist  No oropharyngeal exudate  Eyes: Conjunctivae and EOM are normal  Pupils are equal, round, and reactive to light  No clear nystagmus  Cardiovascular: Normal rate and regular rhythm  No murmur heard  Pulmonary/Chest: Effort normal and breath sounds normal  She has no wheezes  She has no rales  Abdominal: Soft  Bowel sounds are normal  She exhibits no distension  There is no tenderness  Musculoskeletal: She exhibits no edema or tenderness  Neurological: She is alert and oriented to person, place, and time  No cranial nerve deficit  Motor 5/5 and symmetric  Sensation intact grossly  Psychiatric: She has a normal mood and affect  Her behavior is normal  Thought content normal    Vitals reviewed      Additional Data:     Labs:      Results from last 7 days  Lab Units 01/25/18  0517  01/22/18  2200   WBC Thousand/uL 4 69  < > 6 50   HEMOGLOBIN g/dL 14 7  < > 15 4   HEMATOCRIT % 43 9  < > 45 5   PLATELETS Thousands/uL 218  < > 192   NEUTROS PCT %  --   --  76*   LYMPHS PCT %  --   --  16   MONOS PCT %  --   --  8   EOS PCT %  --   --  0   < > = values in this interval not displayed  Results from last 7 days  Lab Units 01/25/18  0517  01/22/18  2200   SODIUM mmol/L 139  < > 140   POTASSIUM mmol/L 3 5  < > 3 7   CHLORIDE mmol/L 103  < > 102   CO2 mmol/L 29  < > 29   BUN mg/dL 7  < > 11   CREATININE mg/dL 0 60  < > 0 74   CALCIUM mg/dL 9 1  < > 8 9   TOTAL PROTEIN g/dL  --   --  8 0   BILIRUBIN TOTAL mg/dL  --   --  0 40   ALK PHOS U/L  --   --  76   ALT U/L  --   --  31   AST U/L  --   --  19   GLUCOSE RANDOM mg/dL 93  < > 130   < > = values in this interval not displayed  Results from last 7 days  Lab Units 01/24/18  0742   INR  0 97       * I Have Reviewed All Lab Data Listed Above  * Additional Pertinent Lab Tests Reviewed: All Labs Within Last 24 Hours Reviewed    Imaging:    Imaging Reports Reviewed Today Include: No new imaging  Imaging Personally Reviewed by Myself Includes:  None    Recent Cultures (last 7 days):           Last 24 Hours Medication List:     acetaminophen 975 mg Oral Q8H [facility]   citalopram 10 mg Oral Daily   lisinopril 10 mg Oral Daily   polyethylene glycol 17 g Oral Daily   senna-docusate sodium 2 tablet Oral Daily   valproate sodium 500 mg Intravenous Q12H   verapamil 40 mg Oral Q12H        Today, Patient Was Seen By: Valentin Hameed DO    ** Please Note: This note has been constructed using a voice recognition system   **

## 2018-01-27 NOTE — PROGRESS NOTES
Progress Note - Neurology   Whitney Elainer 47 y o  female MRN: [id number]  Unit/Bed#: [facility] 923-01 Encounter: [id number]    Assessment:  Reid Branch is a 47 y o  female with past medical history of HTN, gastric bypass surgery, tremors, vitamin b12 and D deficiency who presented to [facility] no 1/22/18 with recurrent thunderclap headaches with underlying intractable headache since 1/15/18  CT head (+) for small volume SAH  Aniography supports diagnosis of reversible cerebral vasoconstriction syndrome (RVCS)  Plan:  1  Intractable headache with recurrent thunderclap headache  -Likely secondary to reversible cerebral vasoconstriction syndrome with suggestive angiogram and MRI brain as below  -MRA normal  -Depacon started yesterday, headache improved since  -MRI brain showed scattered areas of subarachnoid hemorrhage over the vertices of the brain correlating to prior studies, potentially sequela associated with reversible cerebral vasoconstriction syndrome, especially in light of the angiographic findings  No diffusion restriction  -ANCA pending  -RPR, CLOVIS, Lyme antibodies negative, Sjogren's <0 2, Beta-2 Glyco 1 IgG <0 9, Scleroderma SCL-70 <0 2, ESR 13, CRP 3 2   -Celexa continues to be weaned  -Continue Verapamil, HR 50s-60s overnight, asymptomatic  -Continue to avoid vasoactive substances    2  SAH  -Likely associated with RVCS  -Small volume demonstrated on CT head  MRI brain showed SAH correlated with prior studies   -Neurosurgery with no further surgical intervention    Subjective: Today on exam, patient is resting comfortable in bed with family at bedside  Patient states the headache is the best it has been, currently 2/10  Headache free majority of the day yesterday after Depacon was started  No thunderclap headache since Thursday night  Denies CP, SOB, dizziness, vision changes, N/V, abdominal pain, weakness or numbness      ROS:  12 point ROS performed, as stated above, all others negative  Scheduled Meds:    acetaminophen 975 mg Oral Q8H [facility]   citalopram 10 mg Oral Daily   lisinopril 10 mg Oral Daily   polyethylene glycol 17 g Oral Daily   senna-docusate sodium 2 tablet Oral Daily   valproate sodium 500 mg Intravenous Q12H   verapamil 40 mg Oral Q12H     Continuous Infusions:   PRN Meds: bisacodyl    hydrALAZINE    HYDROmorphone    HYDROmorphone    metoclopramide    nicotine    ondansetron    oxyCODONE      Vitals: Blood pressure 116/58, pulse 56, temperature 98 °F (36 7 °C), temperature source Oral, resp  rate 16, height 5' 6" (1 676 m), weight 110 kg (242 lb 8 1 oz), last menstrual period 12/21/2016, SpO2 97 %, not currently breastfeeding  ,Body mass index is 39 14 kg/m²  Physical Exam:  Physical Exam   Constitutional: She is oriented to person, place, and time  She appears well-developed and well-nourished  No distress  HENT:   Head: Normocephalic and atraumatic  Eyes: EOM are normal  Pupils are equal, round, and reactive to light  Neck: Normal range of motion  Neck supple  Cardiovascular: Normal rate and regular rhythm  Pulmonary/Chest: Effort normal and breath sounds normal    Abdominal: Soft  Bowel sounds are normal    Neurological: She is oriented to person, place, and time  She has normal strength  She has a normal Finger-Nose-Finger Test    Reflex Scores:       Tricep reflexes are 2+ on the right side and 2+ on the left side  Bicep reflexes are 2+ on the right side and 2+ on the left side  Brachioradialis reflexes are 2+ on the right side and 2+ on the left side  Patellar reflexes are 2+ on the right side and 2+ on the left side  Achilles reflexes are 2+ on the right side and 2+ on the left side  Skin: Skin is warm and dry  She is not diaphoretic  Psychiatric: She has a normal mood and affect   Her speech is normal and behavior is normal  Judgment and thought content normal      Neurologic Exam     Mental Status   Oriented to person, place, and time  Attention: normal  Concentration: normal    Speech: speech is normal   Level of consciousness: alert    Cranial Nerves     CN II   Visual fields full to confrontation  CN III, IV, VI   Pupils are equal, round, and reactive to light  Extraocular motions are normal    Nystagmus: none   Diplopia: none  Upgaze: normal  Downgaze: normal  Conjugate gaze: present    CN V   Facial sensation intact  CN VII   Facial expression full, symmetric  CN VIII   CN VIII normal      CN IX, X   CN IX normal      CN XI   CN XI normal      CN XII   CN XII normal      Motor Exam     Strength   Strength 5/5 throughout  Sensory Exam   Light touch normal      Gait, Coordination, and Reflexes     Coordination   Finger to nose coordination: normal    Reflexes   Right brachioradialis: 2+  Left brachioradialis: 2+  Right biceps: 2+  Left biceps: 2+  Right triceps: 2+  Left triceps: 2+  Right patellar: 2+  Left patellar: 2+  Right achilles: 2+  Left achilles: 2+  Right : 2+  Left : 2+  Right plantar: normal  Left plantar: normal  Gait deferred  Lab Results: I have personally reviewed pertinent reports  No results found for this or any previous visit (from the past 24 hour(s))  Imaging Studies: I have personally reviewed pertinent reports and I have personally reviewed pertinent films in PACS  MRA head 1/27/18: IMPRESSION:  Normal MRA of the head    MRI brain w and wo contrast 1/25/18: IMPRESSION:   Scattered areas of subarachnoid hemorrhage over the vertices of the brain correlating to prior studies, potentially sequela associated with reversible cerebral vasoconstriction syndrome, especially in light of the angiographic findings  No diffusion restriction  IR cerebral angiography 1/24/18: IMPRESSION:   Few focal areas of smooth narrowing most likely related to a vasculopathy such as reversible cerebral vasoconstriction syndrome    Primary CNS vasculitis felt unlikely as well as atherosclerosis however are not entirely excluded  EKG, Pathology, and Other Studies: I have personally reviewed pertinent reports      VTE Prophylaxis: Reason for no pharmacologic prophylaxis SAH

## 2018-01-28 VITALS
HEART RATE: 54 BPM | TEMPERATURE: 98.1 F | HEIGHT: 66 IN | RESPIRATION RATE: 16 BRPM | OXYGEN SATURATION: 98 % | WEIGHT: 242.51 LBS | SYSTOLIC BLOOD PRESSURE: 125 MMHG | BODY MASS INDEX: 38.97 KG/M2 | DIASTOLIC BLOOD PRESSURE: 58 MMHG

## 2018-01-28 PROBLEM — R51.9 ACUTE INTRACTABLE HEADACHE: Status: RESOLVED | Noted: 2018-01-23 | Resolved: 2018-01-28

## 2018-01-28 PROBLEM — I67.841 REVERSIBLE CEREBROVASCULAR VASOCONSTRICTION SYNDROME: Status: ACTIVE | Noted: 2018-01-28

## 2018-01-28 PROBLEM — I10 ACCELERATED HYPERTENSION: Status: RESOLVED | Noted: 2018-01-23 | Resolved: 2018-01-28

## 2018-01-28 PROBLEM — K59.00 CONSTIPATION: Chronic | Status: RESOLVED | Noted: 2018-01-23 | Resolved: 2018-01-28

## 2018-01-28 PROCEDURE — 99239 HOSP IP/OBS DSCHRG MGMT >30: CPT | Performed by: INTERNAL MEDICINE

## 2018-01-28 RX ORDER — DIVALPROEX SODIUM 500 MG/1
500 TABLET, EXTENDED RELEASE ORAL
Qty: 30 TABLET | Refills: 0 | Status: SHIPPED | OUTPATIENT
Start: 2018-01-28 | End: 2018-02-13 | Stop reason: SDUPTHER

## 2018-01-28 RX ORDER — CITALOPRAM 10 MG/1
10 TABLET ORAL DAILY
Qty: 1 TABLET | Refills: 0 | Status: SHIPPED | OUTPATIENT
Start: 2018-01-29 | End: 2018-01-28

## 2018-01-28 RX ORDER — NICOTINE 21 MG/24HR
1 PATCH, TRANSDERMAL 24 HOURS TRANSDERMAL DAILY PRN
Qty: 28 PATCH | Refills: 0 | Status: SHIPPED | OUTPATIENT
Start: 2018-01-28 | End: 2018-01-28

## 2018-01-28 RX ORDER — VERAPAMIL HYDROCHLORIDE 40 MG/1
40 TABLET ORAL DAILY
Qty: 30 TABLET | Refills: 0 | Status: SHIPPED | OUTPATIENT
Start: 2018-01-29 | End: 2018-01-28

## 2018-01-28 RX ORDER — DIVALPROEX SODIUM 500 MG/1
500 TABLET, EXTENDED RELEASE ORAL
Qty: 30 TABLET | Refills: 0 | Status: SHIPPED | OUTPATIENT
Start: 2018-01-28 | End: 2018-01-28

## 2018-01-28 RX ORDER — VERAPAMIL HYDROCHLORIDE 40 MG/1
40 TABLET ORAL DAILY
Qty: 30 TABLET | Refills: 0 | Status: SHIPPED | OUTPATIENT
Start: 2018-01-29 | End: 2018-02-13 | Stop reason: SDUPTHER

## 2018-01-28 RX ORDER — CITALOPRAM 10 MG/1
10 TABLET ORAL DAILY
Qty: 1 TABLET | Refills: 0 | Status: SHIPPED | OUTPATIENT
Start: 2018-01-29 | End: 2018-02-09 | Stop reason: SINTOL

## 2018-01-28 RX ORDER — ACETAMINOPHEN 500 MG
1000 TABLET ORAL 3 TIMES DAILY
Qty: 180 TABLET | Refills: 0 | Status: SHIPPED | OUTPATIENT
Start: 2018-01-28 | End: 2019-08-16 | Stop reason: ALTCHOICE

## 2018-01-28 RX ORDER — NICOTINE 21 MG/24HR
1 PATCH, TRANSDERMAL 24 HOURS TRANSDERMAL DAILY PRN
Qty: 28 PATCH | Refills: 0 | Status: SHIPPED | OUTPATIENT
Start: 2018-01-28 | End: 2018-03-19 | Stop reason: CLARIF

## 2018-01-28 RX ORDER — ACETAMINOPHEN 500 MG
1000 TABLET ORAL 3 TIMES DAILY
Qty: 180 TABLET | Refills: 0 | Status: SHIPPED | OUTPATIENT
Start: 2018-01-28 | End: 2018-01-28

## 2018-01-28 RX ADMIN — SODIUM CHLORIDE 500 MG: 900 INJECTION, SOLUTION INTRAVENOUS at 12:35

## 2018-01-28 RX ADMIN — ACETAMINOPHEN 975 MG: 325 TABLET ORAL at 05:23

## 2018-01-28 RX ADMIN — ACETAMINOPHEN 975 MG: 325 TABLET ORAL at 13:10

## 2018-01-28 RX ADMIN — CITALOPRAM HYDROBROMIDE 10 MG: 10 TABLET ORAL at 08:24

## 2018-01-28 RX ADMIN — VERAPAMIL HYDROCHLORIDE 40 MG: 80 TABLET, FILM COATED ORAL at 08:24

## 2018-01-28 RX ADMIN — LISINOPRIL 10 MG: 10 TABLET ORAL at 08:24

## 2018-01-28 NOTE — DISCHARGE SUMMARY
Discharge Summary - [facility] Internal Medicine    Patient Information: Se Rogers 47 y o  female MRN: [id number]  Unit/Bed#: [facility] 923-01 Encounter: [id number]    Discharging Physician / Practitioner: Selina Darling DO  PCP: Sonya Alaniz MD  Admission Date: 1/23/2018  Discharge Date: 01/28/18    Disposition:     Home    Reason for Admission: Severe intractable headache with photophobia  Discharge Diagnoses:     Principal Problem:    SAH (subarachnoid hemorrhage) (HCC)  Active Problems:    Hypertension    History of gastric bypass    Obesity due to excess calories    Reversible cerebrovascular vasoconstriction syndrome  Resolved Problems:    Constipation    Acute intractable headache    Accelerated hypertension      Consultations During Hospital Stay:  · Neurology - Dr Zeus Mckoy  · Neurosurgery - Dr Naima Lorenzo    Procedures Performed:     · Conventional Angiogram 1/24/2018 by Dr Naima Lorenzo  Testing showed a few areas of smooth narrowing most likely related to a vasculopathy such as reversible cerebral vaso constriction syndrome  Primary CNS vasculitis was felt unlikely as well as atherosclerosis however they cannot be entirely excluded based on this exam   Tolerated procedure well without complication  Significant Findings / Test Results:     · CTA head and neck on 01/23/2018 - small volume of subarachnoid hemorrhage at the vertex bilaterally right more so than left  No focal intracranial stenosis or aneurysm  No cause for the patient's subarachnoid hemorrhage  No significant carotid or vertebral stenosis  · Noncontrast CT of the head on 01/28/2018 - stable appearance of small right-sided subarachnoid hemorrhage  · MRI of the brain on 01/26/2018 - scattered areas of subarachnoid hemorrhage over the vertex sees of the brain correlating to prior studies, potentially sequela associated with reversible cerebral vasoconstriction syndrome, especially in light of the angiographic findings    No diffusion restriction  · MRA of the brain on 01/27/2018 - normal MRA of the brain  The mild peripheral constrictions identified on prior cerebral angiogram appear to resolved or are not clearly visualized on this exam   · Anca negative, scleroderma antibody negative, Sjogren's antibodies negative, RPR negative, Lyme antibodies negative, beta 2 glycoprotein negative, ESR is only 13, and CRP is only minimally elevated at 3 2  · White blood cell count between 3 2 and 6 5 during this admission  · Hemoglobin and platelet levels all within normal limits  Coagulation studies are normal as well  · Metabolic profiles grossly within normal limits  · In the last 48 hours blood pressures are typically in the 110 to 130's range with only single blood pressure measured at 166/88  Incidental Findings:   · None    Test Results Pending at Discharge (will require follow up): · None     Outpatient Tests Requested:  · Outpatient neurologist will determine if a repeat angiogram to be done depending on any recurrence / persistence of symptoms  Complications:  None    Hospital Course:     Gurinder Mac is a 47 y o  female patient who originally presented to the hospital on 1/23/2018 due to severe intractable headache with sudden onset of severe throbbing in the occipital region which was aggravated by both no AIDS and light  This was associated with severe nausea as well  The patient had been taking Tylenol with codeine as well as Fioricet with only partial relief from her headache symptoms  The patient presented to the emergency department and her blood pressure was noted to be 220/120  A CT scan was done which showed subarachnoid hemorrhage and this prompted patient's admission into the hospital   The patient will go on to have an angiogram done which did not show any aneurysms  With the absence of aneurysms there was no neurosurgical intervention needed    The patient was placed on verapamil 40 mg every 8 hours which was gradually scaled back as this medication did cause some bradycardia for the patient  The patient was symptomatic with bradycardia with some dizziness midway through her hospital stay but with scaling back the medication she has done well with both dizziness as well as continued resolution/improvement of her headache symptoms  We did a vasculitis workup which was essentially negative  It was felt that the most likely cause for the patient's subarachnoid hemorrhage was reversible cerebral vasoconstriction syndrome  In terms of the medications that the patient has been on, citalopram and Fioricet can both contribute to basal spasm, and for this reason, we stopped your set and have been weaning the citalopram   The patient's last dose of citalopram will be a 10 mg daily dose tomorrow  The patient should talk with her primary care physician about alternatives for any depression symptoms  In terms of headache control, the patient was tried on oxycodone initially but this did not yield any improvement  We then placed the patient on scheduled Tylenol at a dose of 975 mg every 8 hours and also place the patient on IV magnesium as well  She did receive intermittent dose of Reglan earlier on due to the nausea which potentially help with the headache as well  The patient was then started on Depacon 500 mg every 12 hours on 01/26/2018  Neurology is recommending continuing Depakote 500 mg daily at bedtime going forth at least until seen by Neurology in the outpatient setting  The patient reports significant improvement of headache down to the range of 1-3 on a scale of 10  The patient is no longer having photophobia or phonophobia  She will also continue on the Tylenol but at a dose of 1000 mg 3 times a day  In terms of the patient's blood pressure, the patient is continued on lisinopril 10 mg daily and will also be on verapamil 40 mg daily    She should follow up in the outpatient setting for additional blood pressure check, but blood pressures overall have been much better controlled than when she 1st came in  The fact that the MRA did not show any vasoconstrictions could indicate that they have resolved, but alternatively could be due to the sensitivity of this test versus conventional angiogram   It is deferred to the outpatient neurology team whether conventional angiogram should be repeated in the outpatient setting down the road to evaluate for any persistence of spasm at all  Condition at Discharge: stable     Discharge Day Visit / Exam:     Subjective: no headache or dizziness with ambulation last night or earlier today  The patient now is much more confident with discharge plan  The discharge instructions are discussed with patient and her wife  Vitals: Blood Pressure: 125/58 (01/28/18 0723)  Pulse: (!) 54 (01/28/18 0723)  Temperature: 98 1 °F (36 7 °C) (01/28/18 0723)  Temp Source: Oral (01/28/18 0723)  Respirations: 16 (01/28/18 0723)  Height: 5' 6" (167 6 cm) (01/23/18 2307)  Weight - Scale: 110 kg (242 lb 8 1 oz) (01/23/18 2307)  SpO2: 98 % (01/28/18 0723)  Exam:   Physical Exam   Constitutional: She is oriented to person, place, and time  No distress  Dressed and ready to discharge sitting comfortably in bed  HENT:   Mouth/Throat: Oropharynx is clear and moist  No oropharyngeal exudate  Eyes: Conjunctivae are normal  Pupils are equal, round, and reactive to light  No nystagmus   Cardiovascular: Normal rate and regular rhythm  No murmur heard  Pulmonary/Chest: Effort normal and breath sounds normal  She has no wheezes  She has no rales  Abdominal: Soft  Bowel sounds are normal  She exhibits no distension  There is no tenderness  Neurological: She is alert and oriented to person, place, and time  No cranial nerve deficit  She exhibits normal muscle tone  No dysarthria or aphasia  Fine motor coordination intact  Vitals reviewed        Discussion with Family: updated patient's wife at bedside  Discharge instructions/Information to patient and family:   See after visit summary for information provided to patient and family  Provisions for Follow-Up Care:  See after visit summary for information related to follow-up care and any pertinent home health orders  Planned Readmission: None     Discharge Statement:  I spent 40 minutes discharging the patient  This time was spent on the day of discharge  I had direct contact with the patient on the day of discharge  Greater than 50% of the total time was spent examining patient, answering all patient questions, arranging and discussing plan of care with patient as well as directly providing post-discharge instructions  Additional time then spent on discharge activities  Discharge Medications:  See after visit summary for reconciled discharge medications provided to patient and family        ** Please Note: This note has been constructed using a voice recognition system **

## 2018-01-28 NOTE — DISCHARGE INSTRUCTIONS
Dear Sean Doherty,     It was our pleasure to care for you here at [facility]   It is our hope that we were always able to meet and exceed the expected standards for your care during your stay  You were hospitalized due to subarachnoid hemorrhage due to reversible cerebral vasoconstriction syndrome (spasms of the arteries in the brain)  You were cared for on the 9th floor under the service of Daphne Hassan,  with the [facility]ist [facility] who covers for your primary care physician (PCP), Ayah Kenny MD, while you were hospitalized  If you have any questions or concerns related to this hospitalization, you may contact us at [phone]  For follow up, we recommend that you follow up with your primary care physician  Please review this entire discharge summary as additional general instructions may be provided later as well  However, at this time we provide for you here, the most important instructions / recommendations at discharge:     · Continue taking Depakote ER at dose of 500 mg nightly  Additionally neurology recommends continuation of schdeuled tylenol for the time being as well  The tylenol dose if 1000 mg (two 500 mg pills) three times per day  · Continue to take the verapamil at a dose of 40 mg daily  · Please contact neurologist office tomorrow to set up appointment for post hospital follow up within next 2-3 weeks  Also contact them or return to hospital if you have any recurrent symptoms  · Take it easy for the next couple of weeks  · We recommend that you have blood pressure check done within the next week to be sure blood pressure is controlled  · Avoid fioricet, large amounts of caffeine, and after dose tomorrow, discontinue citalopram (celexa) altogether  Seek alternative to that medication from your PCP    · Avoid NSAIDs (motrin, aleve, advil, naproxen, ibuprofen, diclofenac, aspirin) as they act as blood thinners and could potentially worsen any bleeding  · Over the counter miralax and senna may be taken if any further problems with constipation  Take as directed on packaging  Sincerely,     Myles Lorenzo, DO           ARTERIOGRAM    WHAT YOU SHOULD KNOW:   An angiogram is a procedure to look at arteries in your body  Arteries are the blood vessels that carry blood from your heart to your body  AFTER YOU LEAVE:     Self-care:   · Limit activity: Rest for the remainder of the day of your procedure  Have some one with you until the next morning  Keep your arm or leg straight as much as possible  Rest as much as possible, sitting lying or reclining  Walk only to go to the bathroom, to bed or to eat  If the angiogram catheter was put in your leg, use the stairs as little as possible  No driving  · Keep your wound clean and dry  You may shower 24 hours after your procedure  The bandage you have on should fall off in 2-3 days  If there is any drainage from the puncture site, you should put on a clean bandage  · Watch for bleeding and bruising: It is normal to have a bruise and soreness where the angiogram catheter went in  · Diet:   · You may resume your regular diet, Sips of flat soda will help with mild nausea  · Drink more liquids than usual for the next 24 hours      · IMMEDIATELY Contact Interventional Radiology at [phone] Cesar PATIENTS: Contact Interventional Radiology at [phone]) Maxine Redding PATIENTS: Contact Interventional Radiology at [phone]) if any of the following occur:  · If your bruise gets larger or if you notice any active bleeding  APPLY DIRECT PRESSURE TO THE BLEEDING SITE  · If you notice increased swelling or have increased pain at the puncture site   · If you have any numbness or pain in the extremity of the puncture site   · If that extremity seems cold or pale      · You have fever greater than 101  · Persistent nausea or vomitting    Follow up with your primary healthcare provider as directed: Write down your questions so you remember to ask them during your visits

## 2018-01-30 ENCOUNTER — TRANSITIONAL CARE MANAGEMENT (OUTPATIENT)
Dept: FAMILY MEDICINE CLINIC | Facility: HOSPITAL | Age: 53
End: 2018-01-30

## 2018-01-30 LAB
C-ANCA TITR SER IF: NORMAL TITER
MYELOPEROXIDASE AB SER IA-ACNC: <9 U/ML (ref 0–9)
P-ANCA ATYPICAL TITR SER IF: NORMAL TITER
P-ANCA TITR SER IF: NORMAL TITER
PROTEINASE3 AB SER IA-ACNC: <3.5 U/ML (ref 0–3.5)

## 2018-01-31 NOTE — CASE MANAGEMENT
Notification of Discharge  This is a Notification of Discharge from our facility [address]  Please be advised that this patient has been discharge from our facility  Below you will find the admission and discharge date and time including the patients disposition  PRESENTATION DATE: 1/23/2018  9:57 PM  IP ADMISSION DATE: 1/23/18 2157  DISCHARGE DATE: 1/28/2018  2:45 PM  DISPOSITION: Home/Self Care    [address] [facility] in the [facility] by Tomer Hebert for 2017  Network Utilization Review Department  Phone: [phone]; Fax [phone]  ATTENTION: The Network Utilization Review Department is now centralized for our 7 Facilities  Make a note that we have a new phone and fax numbers for our Department  Please call with any questions or concerns to [phone] and carefully follow the prompts so that you are directed to the right person  All voicemails are confidential  Fax any determinations, approvals, denials, and requests for initial or continue stay review clinical to [phone]  Due to HIGH CALL volume, it would be easier if you could please send faxed requests to expedite your requests and in part, help us provide discharge notifications faster

## 2018-02-03 ENCOUNTER — DOCUMENTATION (OUTPATIENT)
Dept: NEUROSURGERY | Facility: MEDICAL CENTER | Age: 53
End: 2018-02-03

## 2018-02-04 NOTE — PROGRESS NOTES
RCVS s/p angio during admission  Follow-up with neurology who will likely refer her back for repeat angio at some time (6 weeks or longer)- should be scheduled with Dr Anna Arnett

## 2018-02-09 ENCOUNTER — OFFICE VISIT (OUTPATIENT)
Dept: FAMILY MEDICINE CLINIC | Facility: HOSPITAL | Age: 53
End: 2018-02-09
Payer: COMMERCIAL

## 2018-02-09 VITALS — HEART RATE: 62 BPM | DIASTOLIC BLOOD PRESSURE: 90 MMHG | SYSTOLIC BLOOD PRESSURE: 140 MMHG | OXYGEN SATURATION: 94 %

## 2018-02-09 DIAGNOSIS — R51.9 ACUTE INTRACTABLE HEADACHE, UNSPECIFIED HEADACHE TYPE: ICD-10-CM

## 2018-02-09 DIAGNOSIS — I67.841 REVERSIBLE CEREBROVASCULAR VASOCONSTRICTION SYNDROME: Primary | ICD-10-CM

## 2018-02-09 DIAGNOSIS — I10 ESSENTIAL HYPERTENSION: Chronic | ICD-10-CM

## 2018-02-09 PROCEDURE — 99214 OFFICE O/P EST MOD 30 MIN: CPT | Performed by: PHYSICIAN ASSISTANT

## 2018-02-09 RX ORDER — LISINOPRIL 10 MG/1
1 TABLET ORAL DAILY
COMMUNITY
Start: 2017-12-21 | End: 2018-06-21 | Stop reason: SDUPTHER

## 2018-02-09 RX ORDER — ONDANSETRON 4 MG/1
TABLET, ORALLY DISINTEGRATING ORAL
Refills: 0 | COMMUNITY
Start: 2018-01-18 | End: 2018-05-18 | Stop reason: ALTCHOICE

## 2018-02-09 RX ORDER — LORAZEPAM 0.5 MG/1
0.5 TABLET ORAL EVERY 8 HOURS PRN
Qty: 60 TABLET | Refills: 2 | Status: SHIPPED | OUTPATIENT
Start: 2018-02-09 | End: 2018-03-19 | Stop reason: SDUPTHER

## 2018-02-09 RX ORDER — PROPRANOLOL HYDROCHLORIDE 20 MG/1
20 TABLET ORAL 2 TIMES DAILY
Refills: 3 | COMMUNITY
Start: 2017-11-25 | End: 2018-02-09 | Stop reason: CLARIF

## 2018-02-09 RX ORDER — BUTALBITAL/ASPIRIN/CAFFEINE 50-325-40
1 CAPSULE ORAL
COMMUNITY
Start: 2018-01-19 | End: 2018-02-09 | Stop reason: SINTOL

## 2018-02-09 RX ORDER — METOCLOPRAMIDE 10 MG/1
1 TABLET ORAL EVERY 8 HOURS
COMMUNITY
Start: 2018-01-19 | End: 2018-03-19 | Stop reason: CLARIF

## 2018-02-09 RX ORDER — TRIAMTERENE AND HYDROCHLOROTHIAZIDE 37.5; 25 MG/1; MG/1
1 TABLET ORAL DAILY
Qty: 30 TABLET | Refills: 2 | Status: SHIPPED | OUTPATIENT
Start: 2018-02-09 | End: 2018-05-05 | Stop reason: SDUPTHER

## 2018-02-09 RX ORDER — CITALOPRAM 40 MG/1
1 TABLET ORAL DAILY
COMMUNITY
Start: 2015-03-10 | End: 2018-02-09 | Stop reason: SINTOL

## 2018-02-09 NOTE — PROGRESS NOTES
Assessment/Plan:    No problem-specific Assessment & Plan notes found for this encounter  Problem List Items Addressed This Visit        Cardiovascular and Mediastinum    Hypertension (Chronic)    Relevant Medications    lisinopril (ZESTRIL) 10 mg tablet    triamterene-hydrochlorothiazide (MAXZIDE-25) 37 5-25 mg per tablet    Reversible cerebrovascular vasoconstriction syndrome - Primary       Other    RESOLVED: Acute intractable headache    Relevant Medications    LORazepam (ATIVAN) 0 5 mg tablet            Subjective:      Patient ID: Rissa Aguirre is a 47 y o  female  47year old white female presents with wife, for a follow up from recent hospitalization  Was recently hospitalized, with RCVS   Pulse has been 50's and 60's  Was take off Celexa, due to possible side effects, of causing headaches  Was told to take 2 weeks off, patient not ready to go back to work  Still having headaches, weakness, and high blood pressure  Blood pressure-   172/103, 154/101, 142/102, 148/95, 148/101  Has been feeling depressed, and anxious, has an old Rx  For Lorazepam, requesting refill  Has disability forms for work  Headache    Associated symptoms include weakness  Pertinent negatives include no coughing or fever  Review of Systems   Constitutional: Positive for fatigue  Negative for chills, diaphoresis and fever  Respiratory: Positive for shortness of breath  Negative for cough  Neurological: Positive for tremors, weakness and headaches  Negative for syncope and light-headedness  Psychiatric/Behavioral: Positive for agitation  Negative for sleep disturbance and suicidal ideas  The patient is nervous/anxious  Objective:    Vitals:    02/09/18 1037   BP: 140/90        Physical Exam   Constitutional: She is oriented to person, place, and time  She appears well-developed and well-nourished  She appears distressed  Overweight  HENT:   Head: Normocephalic and atraumatic  Eyes: Right eye exhibits no discharge  Left eye exhibits no discharge  No scleral icterus  Neck: Neck supple  Cardiovascular: Normal rate, regular rhythm and normal heart sounds  Pulmonary/Chest: Effort normal and breath sounds normal  No respiratory distress  She has no wheezes  She has no rales  Musculoskeletal: Normal range of motion  She exhibits no edema, tenderness or deformity  Neurological: She is alert and oriented to person, place, and time  Skin: Skin is warm and dry  She is not diaphoretic  Psychiatric: Her behavior is normal  Judgment and thought content normal    Has low mood, anxious about current situation, and health issues

## 2018-02-09 NOTE — PATIENT INSTRUCTIONS
The patient to add Maxzide  to regimen for better blood pressure control  Continue to monitor blood pressure and pulse keep a log and bring to the next office visit in 2 weeks  Forms will be filled out and faxed to work I recommend short-term disability until symptoms are under control  Patient given prescription for lorazepam to be taken as needed for anxiety  Follow up with neurologist for severe headaches

## 2018-02-13 ENCOUNTER — OFFICE VISIT (OUTPATIENT)
Dept: NEUROLOGY | Facility: CLINIC | Age: 53
End: 2018-02-13
Payer: COMMERCIAL

## 2018-02-13 VITALS
WEIGHT: 234 LBS | RESPIRATION RATE: 15 BRPM | BODY MASS INDEX: 37.61 KG/M2 | SYSTOLIC BLOOD PRESSURE: 142 MMHG | HEIGHT: 66 IN | DIASTOLIC BLOOD PRESSURE: 90 MMHG | HEART RATE: 72 BPM

## 2018-02-13 DIAGNOSIS — G44.89 OTHER HEADACHE SYNDROME: ICD-10-CM

## 2018-02-13 DIAGNOSIS — I67.841 REVERSIBLE CEREBROVASCULAR VASOCONSTRICTION SYNDROME: Primary | ICD-10-CM

## 2018-02-13 DIAGNOSIS — I60.9 SAH (SUBARACHNOID HEMORRHAGE) (HCC): ICD-10-CM

## 2018-02-13 DIAGNOSIS — G89.29 CHRONIC INTRACTABLE HEADACHE, UNSPECIFIED HEADACHE TYPE: ICD-10-CM

## 2018-02-13 DIAGNOSIS — R51.9 CHRONIC INTRACTABLE HEADACHE, UNSPECIFIED HEADACHE TYPE: ICD-10-CM

## 2018-02-13 DIAGNOSIS — G25.0 TREMOR, ESSENTIAL: ICD-10-CM

## 2018-02-13 DIAGNOSIS — I10 ESSENTIAL HYPERTENSION: Chronic | ICD-10-CM

## 2018-02-13 PROCEDURE — 99215 OFFICE O/P EST HI 40 MIN: CPT | Performed by: PHYSICIAN ASSISTANT

## 2018-02-13 RX ORDER — DIVALPROEX SODIUM 500 MG/1
500 TABLET, EXTENDED RELEASE ORAL
Qty: 30 TABLET | Refills: 2 | Status: SHIPPED | OUTPATIENT
Start: 2018-02-13 | End: 2018-05-21 | Stop reason: SDUPTHER

## 2018-02-13 RX ORDER — VERAPAMIL HYDROCHLORIDE 40 MG/1
TABLET ORAL
Qty: 45 TABLET | Refills: 2 | Status: SHIPPED | OUTPATIENT
Start: 2018-02-13 | End: 2018-05-09 | Stop reason: SDUPTHER

## 2018-02-13 NOTE — ASSESSMENT & PLAN NOTE
Patient presented with recurrent thunderclap headaches    Her presentation and imaging, including angiography, supported the diagnosis of RCVS   Case discussed with Dr Clive Álvarez and plan will be to repeat CTA head and neck and MRI brain in 3 months  She should remain off SSRIs and avoid other vasoactive substances

## 2018-02-13 NOTE — PATIENT INSTRUCTIONS
Increase verapamil to 40mg AM and 20mg in the evening  -please monitor your heart rate and blood pressure  HR should not consistently be below 50  Please call our office if any issues  Cont Depakote 500mg at bedtime  Start magnesium 400-500mg daily and can also try B2 200mg daily for headaches  Cont Tylenol for headaches  Can start tapering off Tylenol as well  Will repeat CTA head and neck and MRI brain in 3 months  Follow up with Dr Citlali Bashir in 4-6 weeks  Call for any new or worsening symptoms   Go to Er/call 911 for any acute neurological changes

## 2018-02-13 NOTE — ASSESSMENT & PLAN NOTE
Currently on Verapamil 40mg and Depakote 500mg along with Tylenol 1000mg TID  Headaches are overall much improved, but still having about a 2/10 constant headache  Will increase Verapamil to 40mg AM and 20mg PM  She will monitor her blood pressure and heart rate and report back to me if HR consistently below 50 and/or symptomatic  She can also take magensium 400-500mg daily (was getting through IV in the hospital), and B2 200mg daily

## 2018-02-13 NOTE — ASSESSMENT & PLAN NOTE
Patient briefly asked me about tremor today that has been present for "years"  Described symptoms are most consistent with an essential tremor    We can continue to monitor this and address in the future, as this is not a primary concern to her at this time

## 2018-02-13 NOTE — PROGRESS NOTES
Patient ID: Gurinder Mac is a 47 y o  female  Assessment/Plan:    Reversible cerebrovascular vasoconstriction syndrome  Patient presented with recurrent thunderclap headaches  Her presentation and imaging, including angiography, supported the diagnosis of RCVS   Case discussed with Dr Ani Solis and plan will be to repeat CTA head and neck and MRI brain in 3 months  She should remain off SSRIs and avoid other vasoactive substances     SAH (subarachnoid hemorrhage) ([address] )  Likely due to RCVS, no neurosurgical intervention needed    Other headache syndrome  Currently on Verapamil 40mg and Depakote 500mg along with Tylenol 1000mg TID  Headaches are overall much improved, but still having about a 2/10 constant headache  Will increase Verapamil to 40mg AM and 20mg PM  She will monitor her blood pressure and heart rate and report back to me if HR consistently below 50 and/or symptomatic  She can also take magensium 400-500mg daily (was getting through IV in the hospital), and B2 200mg daily    Hypertension  PCP to manage  She was just started on a diuretic a few days ago  She is monitoring regularly at home  Increasing Verapamil for her headaches may also help lower BP    Tremor, essential  Patient briefly asked me about tremor today that has been present for "years"  Described symptoms are most consistent with an essential tremor  We can continue to monitor this and address in the future, as this is not a primary concern to her at this time       Diagnoses and all orders for this visit:    Reversible cerebrovascular vasoconstriction syndrome  -     CTA head and neck w wo contrast; Future  -     MRI brain with and without contrast; Future  -     BUN; Future  -     Creatinine, serum; Future  -     verapamil (CALAN) 40 mg tablet;  Take 1 tab in the AM and 1/2 tab PM    Essential hypertension    SAH (subarachnoid hemorrhage) (HCC)    Other headache syndrome    Chronic intractable headache, unspecified headache type  -     divalproex sodium (DEPAKOTE ER) 500 mg 24 hr tablet; Take 1 tablet (500 mg total) by mouth daily at bedtime           Subjective:    HPI    Patient is a 47year old female with PMH of HTN, gastric bypass, depression, who presents today for a hospital  follow up  Patient presented to the ED at [facility] on 1/22/18 with complaint of 1 week of severe headaches  She had already presented to [facility] twice for the same complaint  She c/o occipital HA  She reported never having headaches before, and this presentation was her 4th severe headache in 1 week  Blood pressure noted to be high in the ED  She had been taking Tylenol with codeine and Fioricet for her HAs prior to admission  Patient seen by neurology  Symptoms felt to be most consistent with Reversible cerebral vasoconstriction syndrome (RCVS)  She had been on Celexa for a long time, so this was weaned  She was started on Verapamil, magnesium  Opioids were preferred for pain control, avoid steroids, Fioricet and other vasoactive medications  CTA head and neck demonstrated small volume of subarachnoid hemorrhage at the vertex bilaterally right more so than left  CT head demonstrated small right-sided subarachnoid hemorrhage  She was transferred to [facility] for angiography  Angiography done by Dr Gato Siegel and showed a few focal areas of smooth narrowing, most likely related to a vasculopathy such as reversible cerebral vasoconstriction syndrome  Primary CNS vasculitis felt unlikely as well as atherosclerosis however are not entirely excluded  MRI brain Scattered areas of subarachnoid hemorrhage over the vertices of the brain correlating to prior studies, potentially sequela associated with reversible cerebral vasoconstriction syndrome, especially in light of the angiographic findings  No diffusion restriction  MRI brain unremarkable  There was no suspicion for CNS infection, LP not pursued    Her headache was controlled and she was discharged to follow up with outpatient neurology to discuss follow up imaging  She was counseled on quitting smoking, avoiding caffeine, and not resuming SSRIs  Today, patient reports she is overall doing well  She has not had any severe headaches like she was having at time of admission, however she continues to have a constant 2/10 headache  She says sometimes the intensity increases if she is stressed or upset  Her blood pressure has continued to run high, 140-160s over   She saw her PCP last week who added a diuretic to her lisinopril  She remains on Verapamil 40mg daily and Depakote 500mg QHS  She is also taking Tylenol 1000mg TID on a regular basis, with an extra 500mg if needed  She is having increased difficulty with mood and anxiety since being off her Celexa, which she was on for over 15 years  Her PCP prescribed her lorazepam last week to use PRN  She denies any new neurological symptoms  She asked her PCP to fill out [place] paperwork, as she is not ready to return to work  She reports her PCP put her out for 3 months from time of admission, but she hopes to return sooner  She is requesting I fill out the same paperwork today  Patient also briefly asked me about a tremor she has had for "years" now  She says the tremor has slowly gotten worse over the last few years  Tremor is worse with intention such as eating  It seems worse with the right hand  No head tremor  She has not noticed if anything makes it better or worse, but it is not always there  She is unsure if any family history of this  She denies any associated symptoms such as slowed movements, gait abnormality, cognitive changes  The following portions of the patient's history were reviewed and updated as appropriate: current medications, past family history, past medical history, past social history, past surgical history and problem list          Objective:    Blood pressure 142/90, pulse 72, resp   rate 15, height 5' 6" (1 676 m), weight 106 kg (234 lb)    Physical Exam   Constitutional: She appears well-developed and well-nourished  HENT:   Head: Normocephalic and atraumatic  Eyes: EOM are normal  Pupils are equal, round, and reactive to light  Cardiovascular: Intact distal pulses  Neurological: She has normal strength and normal reflexes  Gait and coordination normal    Skin: Skin is warm and dry  Psychiatric: She has a normal mood and affect  Her speech is normal        Neurological Exam    Mental Status  The patient is alert and oriented to person, place, time, and situation  Her recent and remote memory are normal  Her speech is normal  Her language is fluent with no aphasia  She has normal attention span and concentration  She has a normal fund of knowledge  Cranial Nerves    CN II: The patient's visual acuity and visual fields are normal   CN III, IV, VI: The patient's pupils are equally round and reactive to light and ocular movements are normal   CN V: The patient has normal facial sensation  CN VII:  The patient has symmetric facial movement  CN VIII:  The patient's hearing is normal   CN IX, X: The patient has symmetric palate movement and normal gag reflex  CN XI: The patient's shoulder shrug strength is normal   CN XII: The patient's tongue is midline without atrophy or fasciculations  Motor  The patient has normal muscle bulk throughout  Her overall muscle tone is normal throughout  She has normal movement  Her strength is 5/5 throughout all four extremities  Sensory  The patient's sensation is normal in all four extremities  Reflexes  Deep tendon reflexes are 2+ and symmetric in all four extremities with downgoing toes bilaterally  Gait and Coordination  The patient has normal gait and station  She has normal coordination bilaterally  ROS:    Review of Systems   Constitutional: Positive for fatigue  Negative for appetite change and fever     HENT: Positive for congestion  Negative for hearing loss, tinnitus, trouble swallowing and voice change  Eyes: Negative  Negative for photophobia and pain  Respiratory: Positive for shortness of breath  Cardiovascular: Negative  Negative for palpitations  Gastrointestinal: Positive for nausea  Negative for vomiting  Endocrine: Negative  Negative for cold intolerance and heat intolerance  Genitourinary: Negative  Negative for dysuria, frequency and urgency  Musculoskeletal: Negative  Negative for myalgias and neck pain  Skin: Negative  Negative for rash  Neurological: Positive for tremors and headaches  Negative for dizziness, seizures, syncope, facial asymmetry, speech difficulty, weakness, light-headedness and numbness  Twitching  Memory problems    Hematological: Negative  Does not bruise/bleed easily  Psychiatric/Behavioral: Negative for confusion, hallucinations and sleep disturbance  The patient is nervous/anxious  Depression   Mood swings       Total time spent today 40 minutes   Greater than 50% of total time was spent with the patient and / or family counseling and / or coordination of care

## 2018-02-13 NOTE — ASSESSMENT & PLAN NOTE
PCP to manage  She was just started on a diuretic a few days ago    She is monitoring regularly at home  Increasing Verapamil for her headaches may also help lower BP

## 2018-02-19 ENCOUNTER — DOCUMENTATION (OUTPATIENT)
Dept: NEUROLOGY | Facility: CLINIC | Age: 53
End: 2018-02-19

## 2018-02-20 ENCOUNTER — TELEPHONE (OUTPATIENT)
Dept: NEUROLOGY | Facility: CLINIC | Age: 53
End: 2018-02-20

## 2018-02-20 NOTE — PROGRESS NOTES
Jessica Pereira  Pt called this am to tell us she needs her FMLA paperwork re-faxed that you sent yesterday, they only received 1st page  Fax# [phone]    Thanks so much

## 2018-02-20 NOTE — TELEPHONE ENCOUNTER
Needs FMLA to be refaxed, [phone]  Unable to locate signed paperwork, will talk to MA & refax  Please call pt w/update when found  Task sent to MA, see original note

## 2018-02-21 NOTE — PROGRESS NOTES
Disability form was re-faxed over to statement of incapacity [phone] at 10:00 am  I called the patient to let her know the documents was re-faxed for second time

## 2018-02-23 ENCOUNTER — OFFICE VISIT (OUTPATIENT)
Dept: FAMILY MEDICINE CLINIC | Facility: HOSPITAL | Age: 53
End: 2018-02-23
Payer: COMMERCIAL

## 2018-02-23 VITALS
SYSTOLIC BLOOD PRESSURE: 130 MMHG | DIASTOLIC BLOOD PRESSURE: 90 MMHG | HEIGHT: 66 IN | TEMPERATURE: 97.2 F | OXYGEN SATURATION: 99 % | HEART RATE: 66 BPM | BODY MASS INDEX: 37.21 KG/M2 | WEIGHT: 231.5 LBS

## 2018-02-23 DIAGNOSIS — I10 ESSENTIAL HYPERTENSION: Primary | ICD-10-CM

## 2018-02-23 DIAGNOSIS — R05.9 COUGH: ICD-10-CM

## 2018-02-23 PROCEDURE — 99213 OFFICE O/P EST LOW 20 MIN: CPT | Performed by: PHYSICIAN ASSISTANT

## 2018-02-23 RX ORDER — CITALOPRAM 40 MG/1
40 TABLET ORAL DAILY
Refills: 5 | COMMUNITY
Start: 2018-02-13 | End: 2018-03-19 | Stop reason: CLARIF

## 2018-02-23 NOTE — PROGRESS NOTES
Assessment/Plan:    Fluctuating blood pressure  Cough   Headaches       Subjective:      Patient ID: Mesha Castro is a 47 y o  female  47year old white female presents with partner for follow up  Saw neurologist for evaluation  Taking Tylenol tid, for headaches  Has been monitoring blood pressure, fluctuates from 2/10/18 to 2/22/18:   164/98, 146/97, 137/102, 148/90, 138/93, 113/70, 111/57, 121/87; Neurologist recommended 1/2 Verapamil at night (20mg), for added blood pressure control  Cough past 2 weeks, persistent, usually dry, Has nasal congestion that was green, cough is deep, slight SOB  No ear pain, no sore throat  Cough was all night, now less during the night, now when laughing, notices rumble in chest         Cough   Associated symptoms include headaches and shortness of breath  Pertinent negatives include no chills or fever  Review of Systems   Constitutional: Negative for chills, diaphoresis, fatigue and fever  HENT: Negative for sinus pain and sinus pressure  Respiratory: Positive for cough and shortness of breath  Neurological: Positive for headaches  Objective:      /72   Pulse 66   Temp (!) 97 2 °F (36 2 °C) (Tympanic)   Ht 5' 6" (1 676 m)   Wt 105 kg (231 lb 8 oz)   LMP 12/21/2016   SpO2 99%   BMI 37 37 kg/m²          Physical Exam   Constitutional: She is oriented to person, place, and time  She appears well-developed and well-nourished  No distress  HENT:   Head: Normocephalic and atraumatic  Right Ear: External ear normal    Left Ear: External ear normal    Mouth/Throat: Oropharynx is clear and moist  No oropharyngeal exudate  Turbinates inflamed  Eyes: Conjunctivae and EOM are normal  Right eye exhibits no discharge  Left eye exhibits no discharge  No scleral icterus  Pulmonary/Chest: Effort normal  No respiratory distress  She has wheezes  She has no rales  Wheezes noted when patient coughs      Neurological: She is alert and oriented to person, place, and time  Skin: She is not diaphoretic

## 2018-02-23 NOTE — PATIENT INSTRUCTIONS
Patient to try otc meds  For congestion (unable to take Robitussin due to headaches, as recommended by neurologist)  Can try Benadryl, and Fluticasone ns  Continue to monitor blood pressure, recommend taking the 1/2 tab  Of Verapamil at night  Call office with blood pressure readings in 2 weeks

## 2018-02-23 NOTE — PROGRESS NOTES
The patient came to the office in [place] to  the document of FMLA  I sent it by email [email or web address] and faxed over [phone]

## 2018-03-19 ENCOUNTER — OFFICE VISIT (OUTPATIENT)
Dept: FAMILY MEDICINE CLINIC | Facility: HOSPITAL | Age: 53
End: 2018-03-19
Payer: COMMERCIAL

## 2018-03-19 VITALS
BODY MASS INDEX: 37.61 KG/M2 | RESPIRATION RATE: 16 BRPM | HEART RATE: 56 BPM | WEIGHT: 234 LBS | SYSTOLIC BLOOD PRESSURE: 110 MMHG | HEIGHT: 66 IN | DIASTOLIC BLOOD PRESSURE: 72 MMHG

## 2018-03-19 DIAGNOSIS — R51.9 ACUTE INTRACTABLE HEADACHE, UNSPECIFIED HEADACHE TYPE: ICD-10-CM

## 2018-03-19 PROCEDURE — 99214 OFFICE O/P EST MOD 30 MIN: CPT | Performed by: PHYSICIAN ASSISTANT

## 2018-03-19 RX ORDER — TRIAMTERENE AND HYDROCHLOROTHIAZIDE 37.5; 25 MG/1; MG/1
37.5 CAPSULE ORAL DAILY
Refills: 2 | COMMUNITY
Start: 2018-03-07 | End: 2018-03-19 | Stop reason: SDUPTHER

## 2018-03-19 RX ORDER — LORAZEPAM 0.5 MG/1
0.5 TABLET ORAL EVERY 8 HOURS PRN
Qty: 90 TABLET | Refills: 2 | Status: SHIPPED | OUTPATIENT
Start: 2018-03-19 | End: 2019-05-17 | Stop reason: SDUPTHER

## 2018-03-19 NOTE — PROGRESS NOTES
Assessment/Plan:         Diagnoses and all orders for this visit:    Acute intractable headache, unspecified headache type  -     LORazepam (ATIVAN) 0 5 mg tablet; Take 1 tablet (0 5 mg total) by mouth every 8 (eight) hours as needed for anxiety 1-2 tabs  Tid, prn anxiety    Depression/anxiety  HTN- under good control    Other orders  -     Discontinue: triamterene-hydrochlorothiazide (DYAZIDE) 37 5-25 mg per capsule; Take 37 5 capsules by mouth daily        Subjective:      Patient ID: Uriel Issa is a 47 y o  female  47year old white female presents for follow up on HTN  Still having headaches, and takes Tylenol 3000mg  Headaches came back February 25th, daily, relieved slightly with Tylenol  Has been monitoring blood pressure:   Ranges-  114/83, 128/87, 116/74, 116/69, 131/79, 105/68  Headaches usually back of head, near neck area, and left temple  No runny nose, some sneezing, no congestion, no cough, no blurry vision, and no N/V  No abd  Pains, and no diarrhea  Has not returned to work, return to work day 4/9/18  Neurologist said no to any SSRI's, due to brain bleed  Unable to take Fioricet/Fiorinal for headache, told to avoid caffeine, and any NSAID's  Has apt  With neurology on 4/6/18, concerned about what meds  To take for depression, has been stressed for a long time  Review of Systems      Objective:      /72   Pulse 56   Resp 16   Ht 5' 6" (1 676 m)   Wt 106 kg (234 lb)   LMP 12/21/2016   BMI 37 77 kg/m²          Physical Exam   Constitutional: She is oriented to person, place, and time  She appears well-developed and well-nourished  Overweight  HENT:   Head: Normocephalic and atraumatic  Right Ear: External ear normal    Left Ear: External ear normal    Mouth/Throat: Oropharynx is clear and moist  No oropharyngeal exudate  Turbinates inflamed, septum deviated, congestion noted in middle ears      Eyes: Conjunctivae and EOM are normal  Right eye exhibits no discharge  Left eye exhibits no discharge  No scleral icterus  Cardiovascular: Normal rate, regular rhythm and normal heart sounds  Pulmonary/Chest: Effort normal and breath sounds normal  No respiratory distress  She has no wheezes  She has no rales  Musculoskeletal: Normal range of motion  Tension/stiffness noted both trapezius  Neurological: She is alert and oriented to person, place, and time  Skin: She is not diaphoretic  Psychiatric: Her behavior is normal  Judgment and thought content normal    Low/depressed mood, at one point during visit, patient crying, in tears  Nursing note and vitals reviewed

## 2018-03-19 NOTE — PATIENT INSTRUCTIONS
Patient to increase Lorazepam, recommend Pt/counseling, patient refuses at this time  Given back and neck exercise  Given info on stress, continue to monitor blood pressure  Patient to check with neurologist about Effexor for depression, or any recommended anti depressants, since unable to take SSRI's     RTO in 1-2 months  Recommend having outlets/acitivites to help relieve stress level

## 2018-04-06 ENCOUNTER — TELEPHONE (OUTPATIENT)
Dept: FAMILY MEDICINE CLINIC | Facility: HOSPITAL | Age: 53
End: 2018-04-06

## 2018-04-06 ENCOUNTER — OFFICE VISIT (OUTPATIENT)
Dept: NEUROLOGY | Facility: CLINIC | Age: 53
End: 2018-04-06
Payer: COMMERCIAL

## 2018-04-06 VITALS
HEART RATE: 54 BPM | SYSTOLIC BLOOD PRESSURE: 115 MMHG | DIASTOLIC BLOOD PRESSURE: 61 MMHG | HEIGHT: 66 IN | WEIGHT: 236 LBS | BODY MASS INDEX: 37.93 KG/M2

## 2018-04-06 DIAGNOSIS — I67.841 REVERSIBLE CEREBROVASCULAR VASOCONSTRICTION SYNDROME: ICD-10-CM

## 2018-04-06 DIAGNOSIS — F33.2 SEVERE EPISODE OF RECURRENT MAJOR DEPRESSIVE DISORDER, WITHOUT PSYCHOTIC FEATURES (HCC): Primary | ICD-10-CM

## 2018-04-06 DIAGNOSIS — G44.89 OTHER HEADACHE SYNDROME: ICD-10-CM

## 2018-04-06 DIAGNOSIS — I60.9 SAH (SUBARACHNOID HEMORRHAGE) (HCC): Primary | ICD-10-CM

## 2018-04-06 PROCEDURE — 99214 OFFICE O/P EST MOD 30 MIN: CPT | Performed by: PSYCHIATRY & NEUROLOGY

## 2018-04-06 NOTE — PROGRESS NOTES
Patient ID: Annamarie Jeffrey is a 47 y o  female  Assessment/Plan: This is a 46 y/o F who is here as follow up for RCVS   RCVS was thought to be 2/2 SSRIs while I was in the hospital  She was found to have subarachnoid hemmorhage at the vertex bilaterally right more than left found on CTA head and neck which I personally reviewed  She was found to have RCVS   I discussed with them extensively about being on an antidepressant, and I recommended that her PCP should be managing this, and recommend that she should not be taking SSRIs or SNRIs  Diagnoses and all orders for this visit:    SAH (subarachnoid hemorrhage) ([facility] [address] )    Reversible cerebrovascular vasoconstriction syndrome         PLAN:  -continue with current medications  -if Verapamil being given for Bp, then continue at current dosage, she does not need it from a headache standpoint  -rpt CTA and MRI brain 4/24/18  -so as far as restarting anti-depressants, she cannot take SSRIs or SNRIs which have both been implicated in RCVS  -I recommend that she avoid caffeine, red wine   -she can come off Depakote if her headaches are worsened but if it is helping her with mood, she can discuss with her PCP regarding whether or not if she needs mood stabilization  I would like to follow up in 4 months, I would be happy to see her sooner if the need arises  Patient/Guardian was advised to the call the office if they have any questions and concerns in the meantime  Patient/Guardian does understand that if they have any new stroke like symptoms such as facial droop on one side, weakness/paralysis on either side, speech trouble, numbness on one side, balance issues, any vision changes, or any new headache, to call 9-1-1 immediately or to proceed to the nearest ER immediately       Subjective:    HPI    This is a 46 y/o F who is here is a follow up of RCVS   She was admitted in the hospital for headaches, and found to have subarachnoid hemorrhage and was found to have RCVS on CTA  Angiogram was done which revealed multiple narrowing of the vessels  She was discharged home with diagnosis of RCVS   SSRIs have been implicated in this disorder which she was taking  Overall she is doing ok  She does not have any headaches or they are not as severe  She says that she is getting her rpt CTA or MRI brain 4/24/18  She wants     The following portions of the patient's history were reviewed and updated as appropriate:   She  has a past medical history of Anxiety; Hypertension; and Tremor  She   Patient Active Problem List    Diagnosis Date Noted    Other headache syndrome 02/13/2018    Tremor, essential 02/13/2018    Reversible cerebrovascular vasoconstriction syndrome 01/28/2018    History of gastric bypass 01/23/2018    SAH (subarachnoid hemorrhage) ([facility] [address] ) 01/23/2018    Obesity due to excess calories 01/23/2018    Hypertension 01/22/2018     She  has a past surgical history that includes Cholecystectomy and Gastric bypass  Her family history includes Diabetes in her mother; Heart attack in her brother; Heart disease in her brother; Hypertension in her father and mother; Substance Abuse in her brother and sister  She  reports that she has quit smoking  She has never used smokeless tobacco  She reports that she does not drink alcohol or use drugs    Current Outpatient Prescriptions   Medication Sig Dispense Refill    acetaminophen (TYLENOL) 500 mg tablet Take 2 tablets (1,000 mg total) by mouth 3 (three) times a day (Patient taking differently: Take 1,000 mg by mouth every 6 (six) hours as needed  ) 180 tablet 0    B Complex Vitamins (VITAMIN B COMPLEX PO) Place under the tongue      Calcium Carbonate-Vit D-Min (CALCIUM 1200 PO) Take 1 capsule by mouth 4 (four) times a day      divalproex sodium (DEPAKOTE ER) 500 mg 24 hr tablet Take 1 tablet (500 mg total) by mouth daily at bedtime 30 tablet 2    lisinopril (ZESTRIL) 10 mg tablet Take 1 tablet by mouth daily      LORazepam (ATIVAN) 0 5 mg tablet Take 1 tablet (0 5 mg total) by mouth every 8 (eight) hours as needed for anxiety 1-2 tabs  Tid, prn anxiety  90 tablet 2    Multiple Vitamin-Folic Acid TABS Take 1 tablet by mouth daily      ondansetron (ZOFRAN-ODT) 4 mg disintegrating tablet TAKE 1 TABLET BY MOUTH EVERY 8 HOURS AS NEEDED FOR NAUSEA/VOMITING  0    triamterene-hydrochlorothiazide (MAXZIDE-25) 37 5-25 mg per tablet Take 1 tablet by mouth daily 30 tablet 2    verapamil (CALAN) 40 mg tablet Take 1 tab in the AM and 1/2 tab PM (Patient taking differently: 40 mg daily Take 1 tab in the AM ) 45 tablet 2     No current facility-administered medications for this visit  Current Outpatient Prescriptions on File Prior to Visit   Medication Sig    acetaminophen (TYLENOL) 500 mg tablet Take 2 tablets (1,000 mg total) by mouth 3 (three) times a day (Patient taking differently: Take 1,000 mg by mouth every 6 (six) hours as needed  )    B Complex Vitamins (VITAMIN B COMPLEX PO) Place under the tongue    Calcium Carbonate-Vit D-Min (CALCIUM 1200 PO) Take 1 capsule by mouth 4 (four) times a day    divalproex sodium (DEPAKOTE ER) 500 mg 24 hr tablet Take 1 tablet (500 mg total) by mouth daily at bedtime    lisinopril (ZESTRIL) 10 mg tablet Take 1 tablet by mouth daily    LORazepam (ATIVAN) 0 5 mg tablet Take 1 tablet (0 5 mg total) by mouth every 8 (eight) hours as needed for anxiety 1-2 tabs  Tid, prn anxiety   Multiple Vitamin-Folic Acid TABS Take 1 tablet by mouth daily    ondansetron (ZOFRAN-ODT) 4 mg disintegrating tablet TAKE 1 TABLET BY MOUTH EVERY 8 HOURS AS NEEDED FOR NAUSEA/VOMITING    triamterene-hydrochlorothiazide (MAXZIDE-25) 37 5-25 mg per tablet Take 1 tablet by mouth daily    verapamil (CALAN) 40 mg tablet Take 1 tab in the AM and 1/2 tab PM (Patient taking differently: 40 mg daily Take 1 tab in the AM )     No current facility-administered medications on file prior to visit  She has No Known Allergies            Objective:    Blood pressure 115/61, pulse (!) 54, height 5' 6" (1 676 m), weight 107 kg (236 lb), last menstrual period 12/21/2016, not currently breastfeeding  Physical Exam  General - alert, awake, oriented to time  Neurological Exam  Cranial nerves: PERRL, EOMI, facial sensation intact, no facial droop noted  Motor 5/5 throughout  ROS:    Review of Systems   Constitutional: Negative  Negative for appetite change and fever  HENT: Negative  Negative for hearing loss, tinnitus, trouble swallowing and voice change  Eyes: Negative  Negative for photophobia and pain  Respiratory: Negative  Negative for shortness of breath  Cardiovascular: Negative  Negative for palpitations  Gastrointestinal: Negative  Negative for nausea and vomiting  Endocrine: Negative  Negative for cold intolerance and heat intolerance  Genitourinary: Negative  Negative for dysuria, frequency and urgency  Musculoskeletal: Negative  Negative for myalgias and neck pain  Skin: Negative  Negative for rash  Neurological: Positive for headaches  Negative for dizziness, tremors, seizures, syncope, facial asymmetry, speech difficulty, weakness, light-headedness and numbness  Hematological: Negative  Does not bruise/bleed easily  Psychiatric/Behavioral: Negative  Negative for confusion, hallucinations and sleep disturbance

## 2018-04-06 NOTE — TELEPHONE ENCOUNTER
Discussed options of anti depressants, wants to try Trintellix, which is considered an atypical class of anti depressants, and safe due to neurological hx

## 2018-04-14 LAB
BUN SERPL-MCNC: 14 MG/DL (ref 6–24)
BUN/CREAT SERPL: 18 (ref 9–23)
CREAT SERPL-MCNC: 0.79 MG/DL (ref 0.57–1)
SL AMB EGFR AFRICAN AMERICAN: 100 ML/MIN/1.73
SL AMB EGFR NON AFRICAN AMERICAN: 87 ML/MIN/1.73

## 2018-04-23 ENCOUNTER — HOSPITAL ENCOUNTER (OUTPATIENT)
Dept: MRI IMAGING | Facility: HOSPITAL | Age: 53
End: 2018-04-23
Payer: COMMERCIAL

## 2018-04-23 ENCOUNTER — HOSPITAL ENCOUNTER (OUTPATIENT)
Dept: CT IMAGING | Facility: HOSPITAL | Age: 53
Discharge: HOME | End: 2018-04-23
Payer: COMMERCIAL

## 2018-04-23 DIAGNOSIS — I67.841 REVERSIBLE CEREBROVASCULAR VASOCONSTRICTION SYNDROME: ICD-10-CM

## 2018-04-23 PROCEDURE — 70498 CT ANGIOGRAPHY NECK: CPT

## 2018-04-23 PROCEDURE — 70496 CT ANGIOGRAPHY HEAD: CPT

## 2018-04-23 RX ADMIN — IOHEXOL 100 ML: 350 INJECTION, SOLUTION INTRAVENOUS at 11:42

## 2018-05-01 ENCOUNTER — TELEPHONE (OUTPATIENT)
Dept: FAMILY MEDICINE CLINIC | Facility: HOSPITAL | Age: 53
End: 2018-05-01

## 2018-05-01 DIAGNOSIS — F33.2 SEVERE EPISODE OF RECURRENT MAJOR DEPRESSIVE DISORDER, WITHOUT PSYCHOTIC FEATURES (HCC): ICD-10-CM

## 2018-05-01 NOTE — TELEPHONE ENCOUNTER
Pt called  She said you had given her samples of Trintellix 10mg and that you prescribe med if it worked  She would like Rx sent to [facility]

## 2018-05-05 DIAGNOSIS — I10 ESSENTIAL HYPERTENSION: Chronic | ICD-10-CM

## 2018-05-07 RX ORDER — TRIAMTERENE AND HYDROCHLOROTHIAZIDE 37.5; 25 MG/1; MG/1
CAPSULE ORAL DAILY
Qty: 30 CAPSULE | Refills: 2 | Status: SHIPPED | OUTPATIENT
Start: 2018-05-07 | End: 2018-07-29 | Stop reason: SDUPTHER

## 2018-05-09 DIAGNOSIS — I67.841 REVERSIBLE CEREBROVASCULAR VASOCONSTRICTION SYNDROME: ICD-10-CM

## 2018-05-09 RX ORDER — VERAPAMIL HYDROCHLORIDE 40 MG/1
TABLET ORAL
Qty: 45 TABLET | Refills: 2 | Status: SHIPPED | OUTPATIENT
Start: 2018-05-09 | End: 2018-05-18 | Stop reason: SDUPTHER

## 2018-05-18 ENCOUNTER — OFFICE VISIT (OUTPATIENT)
Dept: FAMILY MEDICINE CLINIC | Facility: HOSPITAL | Age: 53
End: 2018-05-18
Payer: COMMERCIAL

## 2018-05-18 VITALS
HEART RATE: 71 BPM | WEIGHT: 219 LBS | SYSTOLIC BLOOD PRESSURE: 110 MMHG | BODY MASS INDEX: 35.2 KG/M2 | HEIGHT: 66 IN | DIASTOLIC BLOOD PRESSURE: 80 MMHG

## 2018-05-18 DIAGNOSIS — I67.841 REVERSIBLE CEREBROVASCULAR VASOCONSTRICTION SYNDROME: ICD-10-CM

## 2018-05-18 DIAGNOSIS — F33.2 SEVERE EPISODE OF RECURRENT MAJOR DEPRESSIVE DISORDER, WITHOUT PSYCHOTIC FEATURES (HCC): ICD-10-CM

## 2018-05-18 PROCEDURE — 99214 OFFICE O/P EST MOD 30 MIN: CPT | Performed by: PHYSICIAN ASSISTANT

## 2018-05-18 RX ORDER — VERAPAMIL HYDROCHLORIDE 40 MG/1
40 TABLET ORAL DAILY
Qty: 30 TABLET | Refills: 0 | COMMUNITY
Start: 2018-05-18 | End: 2018-11-01 | Stop reason: SDUPTHER

## 2018-05-18 NOTE — PROGRESS NOTES
Assessment/Plan:         Diagnoses and all orders for this visit:    Severe episode of recurrent major depressive disorder, without psychotic features (HCC)  -     Vortioxetine HBr (TRINTELLIX) 10 MG TABS; Take 1 tablet (10 mg total) by mouth daily    Reversible cerebrovascular vasoconstriction syndrome  -     verapamil (CALAN) 40 mg tablet; Take 1 tablet (40 mg total) by mouth daily    HTN  Allergies        Subjective:      Patient ID: Margarette Levy is a 47 y o  female  47year old white female presents for follow up  Recent temple, headache, no congestion, no runny nose, and no blurry vision  Would like to discuss recent CT of the brain results  Went back to work full time, April 9th  Has not been checking blood pressure  Has follow up with neurologist in August             Review of Systems   Constitutional: Negative for chills, diaphoresis, fatigue and fever  HENT: Negative for congestion, ear pain, rhinorrhea and sore throat  Eyes: Negative for visual disturbance  Respiratory: Negative for cough and shortness of breath  Neurological: Positive for headaches  Negative for dizziness and light-headedness  Psychiatric/Behavioral: Negative for self-injury, sleep disturbance and suicidal ideas  Objective:      /72   Pulse 71   Ht 5' 6" (1 676 m)   Wt 99 3 kg (219 lb)   LMP 12/21/2016   BMI 35 35 kg/m²          Physical Exam   Constitutional: She is oriented to person, place, and time  She appears well-developed and well-nourished  No distress  HENT:   Head: Normocephalic and atraumatic  Right Ear: External ear normal    Left Ear: External ear normal    Mouth/Throat: Oropharynx is clear and moist  No oropharyngeal exudate  Turbinates inflamed  Eyes: Conjunctivae and EOM are normal  Right eye exhibits no discharge  Left eye exhibits no discharge  No scleral icterus  Cardiovascular: Normal rate, regular rhythm and normal heart sounds      Pulmonary/Chest: Effort normal and breath sounds normal  No respiratory distress  She has no wheezes  She has no rales  Musculoskeletal: Normal range of motion  She exhibits no edema, tenderness or deformity  Neurological: She is alert and oriented to person, place, and time  Skin: She is not diaphoretic  Psychiatric: She has a normal mood and affect  Her behavior is normal  Judgment and thought content normal    Nursing note and vitals reviewed

## 2018-05-18 NOTE — PATIENT INSTRUCTIONS
Discussed medications, and dosages  Patient encouraged to use Flonase NS for allergies, and monitor blood pressure  Patient given info on depression and to monitor sx  To ramón needed dose of Trintellix

## 2018-05-21 DIAGNOSIS — G89.29 CHRONIC INTRACTABLE HEADACHE, UNSPECIFIED HEADACHE TYPE: ICD-10-CM

## 2018-05-21 DIAGNOSIS — R51.9 CHRONIC INTRACTABLE HEADACHE, UNSPECIFIED HEADACHE TYPE: ICD-10-CM

## 2018-05-22 RX ORDER — DIVALPROEX SODIUM 500 MG/1
500 TABLET, EXTENDED RELEASE ORAL
Qty: 30 TABLET | Refills: 2 | Status: SHIPPED | OUTPATIENT
Start: 2018-05-22 | End: 2018-08-17 | Stop reason: SDUPTHER

## 2018-05-22 NOTE — TELEPHONE ENCOUNTER
she is still taking  she states that dr Misa Martinez    wanted to see ct results first   she believes she has about 5-6 days left of medication    Please advise

## 2018-05-22 NOTE — TELEPHONE ENCOUNTER
Pls call pt and see if she is still taking this  Dr Martinez George note mentioned possibly stopping it? ?

## 2018-06-15 ENCOUNTER — TELEPHONE (OUTPATIENT)
Dept: NEUROLOGY | Facility: CLINIC | Age: 53
End: 2018-06-15

## 2018-06-18 ENCOUNTER — TELEPHONE (OUTPATIENT)
Dept: NEUROLOGY | Facility: CLINIC | Age: 53
End: 2018-06-18

## 2018-06-19 ENCOUNTER — TELEPHONE (OUTPATIENT)
Dept: NEUROLOGY | Facility: CLINIC | Age: 53
End: 2018-06-19

## 2018-06-21 ENCOUNTER — TELEPHONE (OUTPATIENT)
Dept: NEUROLOGY | Facility: CLINIC | Age: 53
End: 2018-06-21

## 2018-06-21 DIAGNOSIS — I10 ESSENTIAL HYPERTENSION: Primary | ICD-10-CM

## 2018-06-21 RX ORDER — LISINOPRIL 10 MG/1
TABLET ORAL
Qty: 30 TABLET | Refills: 5 | Status: SHIPPED | OUTPATIENT
Start: 2018-06-21 | End: 2018-12-11 | Stop reason: SDUPTHER

## 2018-07-29 DIAGNOSIS — I10 ESSENTIAL HYPERTENSION: Chronic | ICD-10-CM

## 2018-07-30 RX ORDER — TRIAMTERENE AND HYDROCHLOROTHIAZIDE 37.5; 25 MG/1; MG/1
CAPSULE ORAL DAILY
Qty: 30 CAPSULE | Refills: 2 | Status: SHIPPED | OUTPATIENT
Start: 2018-07-30 | End: 2018-10-25 | Stop reason: SDUPTHER

## 2018-07-31 DIAGNOSIS — I67.841 REVERSIBLE CEREBROVASCULAR VASOCONSTRICTION SYNDROME: ICD-10-CM

## 2018-07-31 RX ORDER — VERAPAMIL HYDROCHLORIDE 40 MG/1
TABLET ORAL
Qty: 45 TABLET | Refills: 2 | Status: SHIPPED | OUTPATIENT
Start: 2018-07-31 | End: 2018-08-03 | Stop reason: CLARIF

## 2018-08-03 ENCOUNTER — OFFICE VISIT (OUTPATIENT)
Dept: FAMILY MEDICINE CLINIC | Facility: HOSPITAL | Age: 53
End: 2018-08-03
Payer: COMMERCIAL

## 2018-08-03 VITALS
BODY MASS INDEX: 34.87 KG/M2 | DIASTOLIC BLOOD PRESSURE: 68 MMHG | RESPIRATION RATE: 16 BRPM | SYSTOLIC BLOOD PRESSURE: 120 MMHG | HEART RATE: 50 BPM | HEIGHT: 66 IN | WEIGHT: 217 LBS

## 2018-08-03 DIAGNOSIS — F33.9 RECURRENT MAJOR DEPRESSIVE DISORDER, REMISSION STATUS UNSPECIFIED (HCC): ICD-10-CM

## 2018-08-03 DIAGNOSIS — M25.512 LEFT SHOULDER PAIN, UNSPECIFIED CHRONICITY: Primary | ICD-10-CM

## 2018-08-03 DIAGNOSIS — F41.9 ANXIETY: ICD-10-CM

## 2018-08-03 DIAGNOSIS — I10 ESSENTIAL HYPERTENSION: ICD-10-CM

## 2018-08-03 PROCEDURE — 99214 OFFICE O/P EST MOD 30 MIN: CPT | Performed by: PHYSICIAN ASSISTANT

## 2018-08-03 PROCEDURE — 3008F BODY MASS INDEX DOCD: CPT | Performed by: PHYSICIAN ASSISTANT

## 2018-08-03 NOTE — PROGRESS NOTES
Assessment/Plan:         Diagnoses and all orders for this visit:    Neck pain  Depression with anxiety-  Chronic/recurrent  HTN    Left shoulder pain, unspecified chronicity  -     Ambulatory referral to Physical Therapy; Future        Subjective:      Patient ID: Katerin Mahoney is a 47 y o  female  47year old white female presents for follow up  Feels improved on new antidepressant, but unsure if as good as it can be  Energy good, appetite-  Better  Back at work- very stressful, focus at work is good  Sleep-  Pretty good  Activities/outlets-  Has many, and is involved  No exercise  Sees neurologist every 3-4 months, Marcella Almodovar  Is having left shoulder pain, may last one minute after reaching for things past 5-6 weeks, no recent injuries; Shoulder bag carried on that side, which can be heavy, and recently was on vacation and carried heavy items  Does have neck pain, and headaches, believes may be stress related  Review of Systems   Respiratory: Negative for cough and shortness of breath  Musculoskeletal: Positive for arthralgias and myalgias  Negative for back pain, neck pain and neck stiffness  Left shoulder pain, when reaching  Neurological: Positive for headaches  Negative for dizziness and light-headedness  Has neck pain, and headaches, believes may be stress related  Psychiatric/Behavioral: Negative for self-injury and suicidal ideas  The patient is nervous/anxious  Stress under some control  Has had panic attacks, not recently  Objective:      /68   Pulse (!) 50   Resp 16   Ht 5' 6" (1 676 m)   Wt 98 4 kg (217 lb)   LMP 12/21/2016   BMI 35 02 kg/m²          Physical Exam   Constitutional: She is oriented to person, place, and time  She appears well-developed and well-nourished  No distress  Slight kyphoses noted  HENT:   Head: Normocephalic and atraumatic     Right Ear: External ear normal    Left Ear: External ear normal    Eyes: Conjunctivae and EOM are normal  Right eye exhibits no discharge  Left eye exhibits no discharge  No scleral icterus  Cardiovascular: Normal rate, regular rhythm and normal heart sounds  Pulmonary/Chest: Effort normal and breath sounds normal  No respiratory distress  She has no wheezes  She has no rales  Musculoskeletal: Normal range of motion  She exhibits no edema or tenderness  Neurological: She is alert and oriented to person, place, and time  Skin: She is not diaphoretic  Psychiatric: Her behavior is normal  Judgment and thought content normal    Low/depressed mood  Nursing note and vitals reviewed

## 2018-08-03 NOTE — PATIENT INSTRUCTIONS
Discussed medications  Will like to hold off increasing anti depressant, is under a lot of stress at work  Info given on anxiety and depression  Patient to ramón sx  Recommend vitamin D 2000IU daily, and exercise  Monitor pulse and blood pressure  Can try otc Motrin or Advil for joint/neck pain  Can consider getting massage, or physical therapy, given referral    RTO in 4 weeks to discuss sx  Of depression/anxiety

## 2018-08-17 DIAGNOSIS — R51.9 CHRONIC INTRACTABLE HEADACHE, UNSPECIFIED HEADACHE TYPE: ICD-10-CM

## 2018-08-17 DIAGNOSIS — G89.29 CHRONIC INTRACTABLE HEADACHE, UNSPECIFIED HEADACHE TYPE: ICD-10-CM

## 2018-08-17 RX ORDER — DIVALPROEX SODIUM 500 MG/1
500 TABLET, EXTENDED RELEASE ORAL
Qty: 30 TABLET | Refills: 2 | Status: SHIPPED | OUTPATIENT
Start: 2018-08-17 | End: 2018-09-10 | Stop reason: CLARIF

## 2018-08-28 DIAGNOSIS — F33.2 SEVERE EPISODE OF RECURRENT MAJOR DEPRESSIVE DISORDER, WITHOUT PSYCHOTIC FEATURES (HCC): ICD-10-CM

## 2018-08-28 RX ORDER — VORTIOXETINE 10 MG/1
TABLET, FILM COATED ORAL
Qty: 30 TABLET | Refills: 3 | Status: SHIPPED | OUTPATIENT
Start: 2018-08-28 | End: 2018-12-11 | Stop reason: SDUPTHER

## 2018-09-10 ENCOUNTER — OFFICE VISIT (OUTPATIENT)
Dept: NEUROLOGY | Facility: CLINIC | Age: 53
End: 2018-09-10
Payer: COMMERCIAL

## 2018-09-10 VITALS — HEART RATE: 86 BPM | DIASTOLIC BLOOD PRESSURE: 80 MMHG | SYSTOLIC BLOOD PRESSURE: 118 MMHG

## 2018-09-10 DIAGNOSIS — I10 ESSENTIAL HYPERTENSION: Chronic | ICD-10-CM

## 2018-09-10 DIAGNOSIS — I67.841 REVERSIBLE CEREBROVASCULAR VASOCONSTRICTION SYNDROME: Primary | ICD-10-CM

## 2018-09-10 DIAGNOSIS — G25.0 TREMOR, ESSENTIAL: ICD-10-CM

## 2018-09-10 PROCEDURE — 99215 OFFICE O/P EST HI 40 MIN: CPT | Performed by: PSYCHIATRY & NEUROLOGY

## 2018-09-10 NOTE — PROGRESS NOTES
Patient ID: Lucian Gleason is a 47 y o  female  Assessment/Plan: This is a 46 y/o Female who is here as follow up for RCVS   RCVS was thought to be 2/2 SSRIs  She had a repeat CTA which was normal which I personally reviewed  PLAN:  -normal CTA head and neck is negative for RCVS  -avoid caffeine/red meat/SNRIs and SSRIs    -discontinued depakote which might be worsening her action/intention tremor at this time  -no additional workup necessary  -patients BP within normal limits, so recommend d/c verapamil to the PCP to avoid rebound hypertension, advised to inform me of how the tremor is doing after stopping verapamil in 2-4 weeks      -I would like to follow up in 6 months, I would be happy to see her sooner if the need arises  Patient/Guardian was advised to the call the office if they have any questions and concerns in the meantime  Patient/Guardian does understand that if they have any new stroke like symptoms such as facial droop on one side, weakness/paralysis on either side, speech trouble, numbness on one side, balance issues, any vision changes, or any new headache, to call 9-1-1 immediately or to proceed to the nearest ER immediately  Problem List Items Addressed This Visit     Hypertension (Chronic)    Reversible cerebrovascular vasoconstriction syndrome - Primary    Tremor, essential             Subjective:    HPI    This is a 46 y/o  Female who is here as a follow up for RCVS  She was admitted in the hospital and was diagnosed with RCVS and she was on SSRIs  She is now on Trintellix for her depression  She says since she has been on Depakote her tremor has worsened which was started for headaches while she was in the hospital for RCVS   She states that she has not had any new symptoms  She continues to smoke but not regularly but more sporadic  She denied any other symptoms  She is compliant with her medications    She does have some occasional stress headaches  The following portions of the patient's history were reviewed and updated as appropriate:   She  has a past medical history of Anxiety; Hypertension; and Tremor  She   Patient Active Problem List    Diagnosis Date Noted    Other headache syndrome 02/13/2018    Tremor, essential 02/13/2018    Reversible cerebrovascular vasoconstriction syndrome 01/28/2018    History of gastric bypass 01/23/2018    SAH (subarachnoid hemorrhage) ([facility] [address] ) 01/23/2018    Obesity due to excess calories 01/23/2018    Hypertension 01/22/2018     She  has a past surgical history that includes Cholecystectomy and Gastric bypass (12/08/2000)  Her family history includes Diabetes in her brother and mother; Heart attack in her brother; Heart disease in her brother; Heart failure in her brother; Hypertension in her brother, father, and mother; Substance Abuse in her brother, family, and sister  She  reports that she has quit smoking  She has never used smokeless tobacco  She reports that she does not drink alcohol or use drugs  Current Outpatient Prescriptions   Medication Sig Dispense Refill    B Complex Vitamins (VITAMIN B COMPLEX PO) Place under the tongue      Calcium Carbonate-Vit D-Min (CALCIUM 1200 PO) Take 1 capsule by mouth 4 (four) times a day      lisinopril (ZESTRIL) 10 mg tablet TAKE 1 TABLET BY MOUTH ONCE DAILY 30 tablet 5    LORazepam (ATIVAN) 0 5 mg tablet Take 1 tablet (0 5 mg total) by mouth every 8 (eight) hours as needed for anxiety 1-2 tabs  Tid, prn anxiety   90 tablet 2    Multiple Vitamin-Folic Acid TABS Take 1 tablet by mouth daily      triamterene-hydrochlorothiazide (DYAZIDE) 37 5-25 mg per capsule TAKE 1 TABLET BY MOUTH DAILY 30 capsule 2    TRINTELLIX 10 MG tablet TAKE 1 TABLET BY MOUTH EVERY DAY 30 tablet 3    verapamil (CALAN) 40 mg tablet Take 1 tablet (40 mg total) by mouth daily 30 tablet 0    Vortioxetine HBr (TRINTELLIX) 10 MG TABS Take 1 tablet (10 mg total) by mouth daily 30 tablet 0    acetaminophen (TYLENOL) 500 mg tablet Take 2 tablets (1,000 mg total) by mouth 3 (three) times a day (Patient not taking: Reported on 9/10/2018 ) 180 tablet 0     No current facility-administered medications for this visit  Current Outpatient Prescriptions on File Prior to Visit   Medication Sig    B Complex Vitamins (VITAMIN B COMPLEX PO) Place under the tongue    Calcium Carbonate-Vit D-Min (CALCIUM 1200 PO) Take 1 capsule by mouth 4 (four) times a day    lisinopril (ZESTRIL) 10 mg tablet TAKE 1 TABLET BY MOUTH ONCE DAILY    LORazepam (ATIVAN) 0 5 mg tablet Take 1 tablet (0 5 mg total) by mouth every 8 (eight) hours as needed for anxiety 1-2 tabs  Tid, prn anxiety   Multiple Vitamin-Folic Acid TABS Take 1 tablet by mouth daily    triamterene-hydrochlorothiazide (DYAZIDE) 37 5-25 mg per capsule TAKE 1 TABLET BY MOUTH DAILY    TRINTELLIX 10 MG tablet TAKE 1 TABLET BY MOUTH EVERY DAY    verapamil (CALAN) 40 mg tablet Take 1 tablet (40 mg total) by mouth daily    Vortioxetine HBr (TRINTELLIX) 10 MG TABS Take 1 tablet (10 mg total) by mouth daily    [DISCONTINUED] divalproex sodium (DEPAKOTE ER) 500 mg 24 hr tablet TAKE 1 TABLET (500 MG TOTAL) BY MOUTH DAILY AT BEDTIME    acetaminophen (TYLENOL) 500 mg tablet Take 2 tablets (1,000 mg total) by mouth 3 (three) times a day (Patient not taking: Reported on 9/10/2018 )     No current facility-administered medications on file prior to visit  She has No Known Allergies            Objective:    Blood pressure 118/80, pulse 86, last menstrual period 12/21/2016, not currently breastfeeding  Physical Exam  General - alert, awake, follows commands  Speech - fluent, no dysarthria noted, no aphasia noted  skin -  no lesions  Cranial nerves: PERRL, EOMI, no facial droop noted, facial sensation intact, tongue midline  Motor 5/5 throughout, mild action tremor noted in both hands, left worse than right     Coordination - no ataxia/dysmetria noted  gait - normal    Neurological Exam      ROS:  I reviewed ROS  Review of Systems   Constitutional: Negative  Negative for appetite change and fever  HENT: Negative  Negative for hearing loss, tinnitus, trouble swallowing and voice change  Eyes: Negative  Negative for photophobia and pain  Respiratory: Negative  Negative for shortness of breath  Cardiovascular: Negative  Negative for palpitations  Gastrointestinal: Negative  Negative for nausea and vomiting  Endocrine: Negative  Negative for cold intolerance and heat intolerance  Genitourinary: Negative  Negative for dysuria, frequency and urgency  Musculoskeletal: Negative  Negative for myalgias and neck pain  Skin: Negative  Negative for rash  Neurological: Positive for tremors and headaches  Negative for dizziness, seizures, syncope, facial asymmetry, speech difficulty, weakness, light-headedness and numbness  Hematological: Negative  Does not bruise/bleed easily  Psychiatric/Behavioral: Negative  Negative for confusion, hallucinations and sleep disturbance

## 2018-09-14 ENCOUNTER — OFFICE VISIT (OUTPATIENT)
Dept: FAMILY MEDICINE CLINIC | Facility: HOSPITAL | Age: 53
End: 2018-09-14
Payer: COMMERCIAL

## 2018-09-14 VITALS — SYSTOLIC BLOOD PRESSURE: 124 MMHG | WEIGHT: 215 LBS | BODY MASS INDEX: 34.7 KG/M2 | DIASTOLIC BLOOD PRESSURE: 76 MMHG

## 2018-09-14 DIAGNOSIS — R79.89 LOW VITAMIN D LEVEL: ICD-10-CM

## 2018-09-14 DIAGNOSIS — Z98.84 HX OF GASTRIC BYPASS: ICD-10-CM

## 2018-09-14 DIAGNOSIS — R25.1 TREMORS OF NERVOUS SYSTEM: ICD-10-CM

## 2018-09-14 DIAGNOSIS — I67.841 REVERSIBLE CEREBROVASCULAR VASOCONSTRICTION SYNDROME: ICD-10-CM

## 2018-09-14 DIAGNOSIS — R73.01 ELEVATED FASTING GLUCOSE: ICD-10-CM

## 2018-09-14 DIAGNOSIS — F33.9 EPISODE OF RECURRENT MAJOR DEPRESSIVE DISORDER, UNSPECIFIED DEPRESSION EPISODE SEVERITY (HCC): ICD-10-CM

## 2018-09-14 DIAGNOSIS — I10 ESSENTIAL HYPERTENSION: Primary | ICD-10-CM

## 2018-09-14 PROCEDURE — 99214 OFFICE O/P EST MOD 30 MIN: CPT | Performed by: PHYSICIAN ASSISTANT

## 2018-09-14 RX ORDER — CLONIDINE HYDROCHLORIDE 0.1 MG/1
0.1 TABLET ORAL
Qty: 30 TABLET | Refills: 2 | Status: SHIPPED | OUTPATIENT
Start: 2018-09-14 | End: 2018-12-08 | Stop reason: SDUPTHER

## 2018-09-14 NOTE — PATIENT INSTRUCTIONS
Discussed recommended supplements:  Calcium, vitamin D 2000IU, vitamin B complex, and Omega 3 fish oil bid  Sent to get complete/comprehensive blood test    Urged to monitor blood pressure  Start Clonidine 0 1 mg  Qhs, prior to stopping or adjusting other medications  Warned about low blood pressure and possible dizziness or syncopal episode as warning signs

## 2018-09-14 NOTE — PROGRESS NOTES
Assessment/Plan:         Diagnoses and all orders for this visit:    Essential hypertension  -     CBC and differential; Future  -     Comprehensive metabolic panel; Future  -     Lipid panel; Future  -     TSH, 3rd generation with Free T4 reflex; Future  -     HEMOGLOBIN A1C W/ EAG ESTIMATION; Future  -     Iron, TIBC and Ferritin Panel; Future  -     Vitamin B12; Future  -     Vitamin D 25 hydroxy; Future  -     cloNIDine (CATAPRES) 0 1 mg tablet; Take 1 tablet (0 1 mg total) by mouth daily at bedtime    Hx of gastric bypass  -     CBC and differential; Future  -     Comprehensive metabolic panel; Future  -     Lipid panel; Future  -     TSH, 3rd generation with Free T4 reflex; Future  -     Iron, TIBC and Ferritin Panel; Future  -     Vitamin B12; Future  -     Vitamin D 25 hydroxy; Future      Episode of recurrent major depressive disorder, unspecified depression episode severity ([facility] [address] )  -     CBC and differential; Future  -     Comprehensive metabolic panel; Future  -     Lipid panel; Future  -     TSH, 3rd generation with Free T4 reflex; Future  -     Iron, TIBC and Ferritin Panel; Future  -     Vitamin B12; Future  -     Vitamin D 25 hydroxy; Future    Reversible cerebrovascular vasoconstriction syndrome  -     CBC and differential; Future  -     Comprehensive metabolic panel; Future  -     Lipid panel; Future  -     TSH, 3rd generation with Free T4 reflex; Future  -     HEMOGLOBIN A1C W/ EAG ESTIMATION; Future  -     Iron, TIBC and Ferritin Panel; Future  -     Vitamin B12; Future  -     Vitamin D 25 hydroxy; Future    Tremors of nervous system  -     CBC and differential; Future  -     Comprehensive metabolic panel; Future  -     Lipid panel; Future  -     TSH, 3rd generation with Free T4 reflex; Future  -     HEMOGLOBIN A1C W/ EAG ESTIMATION; Future  -     Iron, TIBC and Ferritin Panel; Future  -     Vitamin B12; Future  -     Vitamin D 25 hydroxy;  Future    Subjective:      Patient ID: Marky Pedro is a 47 y o  female  47year old white female presents for follow up  Saw neurologist, ruled out Parkinson and MS  Recommended stopping Depakote, and Verapamil  Having tremors and was told could be due to Depakote for seizures, stopped Depakote past Monday, 0/10/18  Was recently traveling, and under more stress  Returned from [facility] yesterday, next week travels to [facility], and other destinations  Not taking vitamins regularly, and has not been monitoring blood pressure  Depression   Pertinent negatives include no arthralgias, chest pain, chills, coughing, diaphoresis, fatigue, fever, myalgias, neck pain or weakness  Review of Systems   Constitutional: Negative for appetite change, chills, diaphoresis, fatigue and fever  Eyes: Negative for visual disturbance  Respiratory: Negative for cough, chest tightness and shortness of breath  Cardiovascular: Negative for chest pain, palpitations and leg swelling  Endocrine: Negative for polydipsia, polyphagia and polyuria  Genitourinary: Negative for hematuria, vaginal bleeding and vaginal discharge  Musculoskeletal: Negative for arthralgias, back pain, gait problem, myalgias, neck pain and neck stiffness  Neurological: Positive for tremors  Negative for syncope and weakness  Psychiatric/Behavioral: Positive for agitation, confusion, decreased concentration and depression  Negative for hallucinations, self-injury, sleep disturbance and suicidal ideas  The patient is nervous/anxious and is hyperactive  Objective:      LMP 12/21/2016          Physical Exam   Constitutional: She is oriented to person, place, and time  She appears well-developed and well-nourished  No distress  HENT:   Head: Normocephalic and atraumatic  Eyes: Conjunctivae and EOM are normal  Right eye exhibits no discharge  Left eye exhibits no discharge  No scleral icterus  Cardiovascular: Normal rate, regular rhythm and normal heart sounds  Pulmonary/Chest: Effort normal and breath sounds normal  No respiratory distress  She has no wheezes  She has no rales  Musculoskeletal: Normal range of motion  She exhibits no edema or tenderness  Neurological: She is alert and oriented to person, place, and time  Coordination normal    Tremors noted affecting both hands  Skin: She is not diaphoretic  Psychiatric: She has a normal mood and affect  Her behavior is normal  Judgment and thought content normal    Nursing note and vitals reviewed

## 2018-10-05 LAB — HBA1C MFR BLD HPLC: 4.9 %

## 2018-10-06 LAB
25(OH)D3+25(OH)D2 SERPL-MCNC: 19.4 NG/ML (ref 30–100)
ALBUMIN SERPL-MCNC: 4.4 G/DL (ref 3.5–5.5)
ALBUMIN/GLOB SERPL: 2.1 {RATIO} (ref 1.2–2.2)
ALP SERPL-CCNC: 47 IU/L (ref 39–117)
ALT SERPL-CCNC: 12 IU/L (ref 0–32)
AST SERPL-CCNC: 17 IU/L (ref 0–40)
BASOPHILS # BLD AUTO: 0 X10E3/UL (ref 0–0.2)
BASOPHILS NFR BLD AUTO: 0 %
BILIRUB SERPL-MCNC: 0.6 MG/DL (ref 0–1.2)
BUN SERPL-MCNC: 13 MG/DL (ref 6–24)
BUN/CREAT SERPL: 14 (ref 9–23)
CALCIUM SERPL-MCNC: 9.5 MG/DL (ref 8.7–10.2)
CHLORIDE SERPL-SCNC: 98 MMOL/L (ref 96–106)
CHOLEST SERPL-MCNC: 163 MG/DL (ref 100–199)
CO2 SERPL-SCNC: 26 MMOL/L (ref 20–29)
CREAT SERPL-MCNC: 0.91 MG/DL (ref 0.57–1)
EOSINOPHIL # BLD AUTO: 0 X10E3/UL (ref 0–0.4)
EOSINOPHIL NFR BLD AUTO: 1 %
ERYTHROCYTE [DISTWIDTH] IN BLOOD BY AUTOMATED COUNT: 14.3 % (ref 12.3–15.4)
EST. AVERAGE GLUCOSE BLD GHB EST-MCNC: 94 MG/DL
FERRITIN SERPL-MCNC: 58 NG/ML (ref 15–150)
GLOBULIN SER-MCNC: 2.1 G/DL (ref 1.5–4.5)
GLUCOSE SERPL-MCNC: 85 MG/DL (ref 65–99)
HBA1C MFR BLD: 4.9 % (ref 4.8–5.6)
HCT VFR BLD AUTO: 39.7 % (ref 34–46.6)
HDLC SERPL-MCNC: 62 MG/DL
HGB BLD-MCNC: 13.4 G/DL (ref 11.1–15.9)
IMM GRANULOCYTES # BLD: 0 X10E3/UL (ref 0–0.1)
IMM GRANULOCYTES NFR BLD: 0 %
IRON SATN MFR SERPL: 30 % (ref 15–55)
IRON SERPL-MCNC: 97 UG/DL (ref 27–159)
LABCORP COMMENT: NORMAL
LDLC SERPL CALC-MCNC: 87 MG/DL (ref 0–99)
LYMPHOCYTES # BLD AUTO: 1 X10E3/UL (ref 0.7–3.1)
LYMPHOCYTES NFR BLD AUTO: 27 %
MCH RBC QN AUTO: 32.2 PG (ref 26.6–33)
MCHC RBC AUTO-ENTMCNC: 33.8 G/DL (ref 31.5–35.7)
MCV RBC AUTO: 95 FL (ref 79–97)
MONOCYTES # BLD AUTO: 0.4 X10E3/UL (ref 0.1–0.9)
MONOCYTES NFR BLD AUTO: 9 %
NEUTROPHILS # BLD AUTO: 2.4 X10E3/UL (ref 1.4–7)
NEUTROPHILS NFR BLD AUTO: 63 %
PLATELET # BLD AUTO: 146 X10E3/UL (ref 150–379)
POTASSIUM SERPL-SCNC: 4.1 MMOL/L (ref 3.5–5.2)
PROT SERPL-MCNC: 6.5 G/DL (ref 6–8.5)
RBC # BLD AUTO: 4.16 X10E6/UL (ref 3.77–5.28)
SL AMB EGFR AFRICAN AMERICAN: 84 ML/MIN/1.73
SL AMB EGFR NON AFRICAN AMERICAN: 73 ML/MIN/1.73
SL AMB VLDL CHOLESTEROL CALC: 14 MG/DL (ref 5–40)
SODIUM SERPL-SCNC: 140 MMOL/L (ref 134–144)
TIBC SERPL-MCNC: 326 UG/DL (ref 250–450)
TRIGL SERPL-MCNC: 69 MG/DL (ref 0–149)
TSH SERPL DL<=0.005 MIU/L-ACNC: 1.59 UIU/ML (ref 0.45–4.5)
UIBC SERPL-MCNC: 229 UG/DL (ref 131–425)
VIT B12 SERPL-MCNC: <150 PG/ML (ref 232–1245)
WBC # BLD AUTO: 3.8 X10E3/UL (ref 3.4–10.8)

## 2018-10-09 ENCOUNTER — TELEPHONE (OUTPATIENT)
Dept: FAMILY MEDICINE CLINIC | Facility: HOSPITAL | Age: 53
End: 2018-10-09

## 2018-10-09 DIAGNOSIS — R79.89 LOW VITAMIN D LEVEL: Primary | ICD-10-CM

## 2018-10-09 RX ORDER — ERGOCALCIFEROL 1.25 MG/1
50000 CAPSULE ORAL WEEKLY
Qty: 8 CAPSULE | Refills: 0 | Status: SHIPPED | OUTPATIENT
Start: 2018-10-09 | End: 2018-10-11 | Stop reason: SDUPTHER

## 2018-10-11 ENCOUNTER — TELEPHONE (OUTPATIENT)
Dept: FAMILY MEDICINE CLINIC | Facility: HOSPITAL | Age: 53
End: 2018-10-11

## 2018-10-11 DIAGNOSIS — R79.89 LOW VITAMIN D LEVEL: ICD-10-CM

## 2018-10-11 DIAGNOSIS — N39.0 URINARY TRACT INFECTION WITHOUT HEMATURIA, SITE UNSPECIFIED: Primary | ICD-10-CM

## 2018-10-11 RX ORDER — CIPROFLOXACIN 250 MG/1
250 TABLET, FILM COATED ORAL EVERY 12 HOURS SCHEDULED
Qty: 14 TABLET | Refills: 0 | Status: SHIPPED | OUTPATIENT
Start: 2018-10-11 | End: 2018-10-19 | Stop reason: ALTCHOICE

## 2018-10-11 RX ORDER — ERGOCALCIFEROL 1.25 MG/1
50000 CAPSULE ORAL WEEKLY
Qty: 4 CAPSULE | Refills: 1 | Status: SHIPPED | OUTPATIENT
Start: 2018-10-11 | End: 2019-01-17 | Stop reason: CLARIF

## 2018-10-19 ENCOUNTER — OFFICE VISIT (OUTPATIENT)
Dept: FAMILY MEDICINE CLINIC | Facility: HOSPITAL | Age: 53
End: 2018-10-19
Payer: COMMERCIAL

## 2018-10-19 VITALS
WEIGHT: 214.5 LBS | HEIGHT: 66 IN | SYSTOLIC BLOOD PRESSURE: 120 MMHG | TEMPERATURE: 97.6 F | DIASTOLIC BLOOD PRESSURE: 80 MMHG | OXYGEN SATURATION: 99 % | BODY MASS INDEX: 34.47 KG/M2 | HEART RATE: 57 BPM

## 2018-10-19 DIAGNOSIS — K21.9 GASTROESOPHAGEAL REFLUX DISEASE WITHOUT ESOPHAGITIS: ICD-10-CM

## 2018-10-19 DIAGNOSIS — G44.89 OTHER HEADACHE SYNDROME: ICD-10-CM

## 2018-10-19 DIAGNOSIS — J30.9 ALLERGIC RHINITIS, UNSPECIFIED SEASONALITY, UNSPECIFIED TRIGGER: Primary | ICD-10-CM

## 2018-10-19 DIAGNOSIS — I10 ESSENTIAL HYPERTENSION: Chronic | ICD-10-CM

## 2018-10-19 PROCEDURE — 3008F BODY MASS INDEX DOCD: CPT | Performed by: PHYSICIAN ASSISTANT

## 2018-10-19 PROCEDURE — 3079F DIAST BP 80-89 MM HG: CPT | Performed by: PHYSICIAN ASSISTANT

## 2018-10-19 PROCEDURE — 3074F SYST BP LT 130 MM HG: CPT | Performed by: PHYSICIAN ASSISTANT

## 2018-10-19 PROCEDURE — 99214 OFFICE O/P EST MOD 30 MIN: CPT | Performed by: PHYSICIAN ASSISTANT

## 2018-10-19 RX ORDER — FLUTICASONE PROPIONATE 50 MCG
2 SPRAY, SUSPENSION (ML) NASAL DAILY
Qty: 16 G | Refills: 2 | Status: SHIPPED | OUTPATIENT
Start: 2018-10-19 | End: 2019-01-17 | Stop reason: CLARIF

## 2018-10-19 NOTE — PATIENT INSTRUCTIONS
Patient to consider cardiac evaluation  Continue to monitor blood pressure, and take Aspirin for chest pain, prn    Start Fluticasone, and otc Prilosec or Prevacid regularly  for 4-12 weeks  Continue vitamin B complex, and vitamin D weekly

## 2018-10-19 NOTE — PROGRESS NOTES
Assessment/Plan:         Diagnoses and all orders for this visit:    Allergic rhinitis, unspecified seasonality, unspecified trigger  -     fluticasone (FLONASE) 50 mcg/act nasal spray; 2 sprays into each nostril daily Use daily for 1-2 weeks  Essential hypertension    Other headache syndrome      Low vitamin D and B  GERD    Subjective:      Patient ID: Arthur Mariscal is a 47 y o  female  47year old white female presents for follow up on HTN  Has been monitoring blood pressure:    125/79, or 143/83, but mostly wnl  One night had chest pain 10/11/18, checked blood pressure:    157/107;  Took aspirin, and laid down, causing pain to go away  Had 2 glasses of wine, that night, and pain occurred after dinner  Pain was in center of chest, and non radiating, lasted 10-15 minutes, intermittently;  Pain went away after aspirin and rest     Does have a cough past few weeks, feels mucous in throat, daughter sick with sinus congestion  Review of Systems   Constitutional: Positive for fatigue  Negative for chills, diaphoresis and fever  HENT: Positive for rhinorrhea and sore throat  Negative for congestion and ear pain  Respiratory: Positive for cough  Negative for chest tightness and shortness of breath  Cardiovascular: Positive for chest pain  Negative for palpitations and leg swelling  Gastrointestinal: Positive for nausea  Negative for abdominal pain and vomiting  One episode few weeks ago  Has GERD sx  Neurological: Positive for headaches  Negative for dizziness and light-headedness  Minor headaches, neck, sometimes temple area  Objective:      /68   Pulse 57   Ht 5' 6" (1 676 m)   Wt 97 3 kg (214 lb 8 oz)   LMP 12/21/2016   SpO2 99%   BMI 34 62 kg/m²          Physical Exam   Constitutional: She is oriented to person, place, and time  She appears well-developed and well-nourished  No distress  HENT:   Head: Normocephalic and atraumatic  Right Ear: External ear normal    Left Ear: External ear normal    Mouth/Throat: Oropharynx is clear and moist  No oropharyngeal exudate  Turbinates inflamed  Eyes: Conjunctivae and EOM are normal  Right eye exhibits no discharge  Left eye exhibits no discharge  No scleral icterus  Cardiovascular: Normal rate, regular rhythm and normal heart sounds  Pulmonary/Chest: Effort normal and breath sounds normal  No respiratory distress  She has no wheezes  She has no rales  Musculoskeletal: Normal range of motion  She exhibits no edema  Neurological: She is alert and oriented to person, place, and time  Skin: She is not diaphoretic  Psychiatric: She has a normal mood and affect   Her behavior is normal  Judgment and thought content normal

## 2018-10-25 DIAGNOSIS — I10 ESSENTIAL HYPERTENSION: Chronic | ICD-10-CM

## 2018-10-25 RX ORDER — TRIAMTERENE AND HYDROCHLOROTHIAZIDE 37.5; 25 MG/1; MG/1
CAPSULE ORAL DAILY
Qty: 30 CAPSULE | Refills: 2 | Status: SHIPPED | OUTPATIENT
Start: 2018-10-25 | End: 2019-01-22 | Stop reason: SDUPTHER

## 2018-11-01 DIAGNOSIS — I67.841 REVERSIBLE CEREBROVASCULAR VASOCONSTRICTION SYNDROME: ICD-10-CM

## 2018-11-02 RX ORDER — VERAPAMIL HYDROCHLORIDE 40 MG/1
40 TABLET ORAL DAILY
Qty: 30 TABLET | Refills: 3 | Status: SHIPPED | OUTPATIENT
Start: 2018-11-02 | End: 2019-01-17 | Stop reason: SDUPTHER

## 2018-11-30 ENCOUNTER — OFFICE VISIT (OUTPATIENT)
Dept: FAMILY MEDICINE CLINIC | Facility: HOSPITAL | Age: 53
End: 2018-11-30
Payer: COMMERCIAL

## 2018-11-30 VITALS
HEART RATE: 53 BPM | SYSTOLIC BLOOD PRESSURE: 110 MMHG | BODY MASS INDEX: 34.72 KG/M2 | DIASTOLIC BLOOD PRESSURE: 70 MMHG | HEIGHT: 66 IN | WEIGHT: 216 LBS

## 2018-11-30 DIAGNOSIS — I10 ESSENTIAL HYPERTENSION: Primary | ICD-10-CM

## 2018-11-30 DIAGNOSIS — F41.9 ANXIETY: ICD-10-CM

## 2018-11-30 PROCEDURE — 1036F TOBACCO NON-USER: CPT | Performed by: PHYSICIAN ASSISTANT

## 2018-11-30 PROCEDURE — 99213 OFFICE O/P EST LOW 20 MIN: CPT | Performed by: PHYSICIAN ASSISTANT

## 2018-11-30 PROCEDURE — 3008F BODY MASS INDEX DOCD: CPT | Performed by: PHYSICIAN ASSISTANT

## 2018-11-30 PROCEDURE — 3078F DIAST BP <80 MM HG: CPT | Performed by: PHYSICIAN ASSISTANT

## 2018-11-30 PROCEDURE — 3074F SYST BP LT 130 MM HG: CPT | Performed by: PHYSICIAN ASSISTANT

## 2018-11-30 NOTE — PROGRESS NOTES
Assessment/Plan:    HTN  Anxiety  History of gastric bypass  Subjective:      Patient ID: Maximiliano Miller is a 47 y o  female  47year old presents for follow up  Blood pressure ranges in 120/70's ranges  Stress better, and headaches more tolerable  Review of Systems   Constitutional: Negative for chills, diaphoresis, fatigue and fever  Respiratory: Negative for cough and shortness of breath  Gastrointestinal: Negative for abdominal pain, constipation, diarrhea, nausea and vomiting  Musculoskeletal: Negative for arthralgias, back pain, myalgias, neck pain and neck stiffness  Neurological: Positive for headaches  Negative for dizziness and light-headedness  Objective:      /68 (BP Location: Right arm, Patient Position: Sitting, Cuff Size: Large)   Pulse (!) 53   Ht 5' 6" (1 676 m)   Wt 98 kg (216 lb)   LMP 12/21/2016   BMI 34 86 kg/m²          Physical Exam   Constitutional: She is oriented to person, place, and time  She appears well-developed and well-nourished  No distress  Eyes: Conjunctivae and EOM are normal  Right eye exhibits no discharge  Left eye exhibits no discharge  No scleral icterus  Cardiovascular: Normal rate, regular rhythm and normal heart sounds  Pulmonary/Chest: Effort normal and breath sounds normal  No respiratory distress  She has no wheezes  She has no rales  Neurological: She is alert and oriented to person, place, and time  Skin: She is not diaphoretic  Psychiatric: She has a normal mood and affect  Her behavior is normal  Judgment and thought content normal    Nursing note and vitals reviewed

## 2018-11-30 NOTE — PATIENT INSTRUCTIONS
Continue to monitor blood pressure  Follow up with neurology, and continue all present medications  Recommend vitamin B complex, and small frequent meals with protein daily

## 2018-12-08 DIAGNOSIS — I10 ESSENTIAL HYPERTENSION: ICD-10-CM

## 2018-12-10 RX ORDER — CLONIDINE HYDROCHLORIDE 0.1 MG/1
0.1 TABLET ORAL
Qty: 30 TABLET | Refills: 2 | Status: SHIPPED | OUTPATIENT
Start: 2018-12-10 | End: 2019-03-13 | Stop reason: SDUPTHER

## 2018-12-11 DIAGNOSIS — I10 ESSENTIAL HYPERTENSION: ICD-10-CM

## 2018-12-11 DIAGNOSIS — F33.2 SEVERE EPISODE OF RECURRENT MAJOR DEPRESSIVE DISORDER, WITHOUT PSYCHOTIC FEATURES (HCC): ICD-10-CM

## 2018-12-11 RX ORDER — LISINOPRIL 10 MG/1
TABLET ORAL
Qty: 30 TABLET | Refills: 5 | Status: SHIPPED | OUTPATIENT
Start: 2018-12-11 | End: 2019-02-15 | Stop reason: SDUPTHER

## 2018-12-12 RX ORDER — VORTIOXETINE 10 MG/1
TABLET, FILM COATED ORAL
Qty: 30 TABLET | Refills: 3 | Status: SHIPPED | OUTPATIENT
Start: 2018-12-12 | End: 2019-05-04 | Stop reason: SDUPTHER

## 2018-12-23 ENCOUNTER — HOSPITAL ENCOUNTER (EMERGENCY)
Facility: HOSPITAL | Age: 53
Discharge: HOME | End: 2018-12-23
Attending: EMERGENCY MEDICINE | Admitting: EMERGENCY MEDICINE
Payer: COMMERCIAL

## 2018-12-23 ENCOUNTER — APPOINTMENT (EMERGENCY)
Dept: RADIOLOGY | Facility: HOSPITAL | Age: 53
End: 2018-12-23
Payer: COMMERCIAL

## 2018-12-23 VITALS
WEIGHT: 217 LBS | OXYGEN SATURATION: 97 % | TEMPERATURE: 97.4 F | HEIGHT: 66 IN | HEART RATE: 78 BPM | BODY MASS INDEX: 34.87 KG/M2 | SYSTOLIC BLOOD PRESSURE: 180 MMHG | DIASTOLIC BLOOD PRESSURE: 80 MMHG | RESPIRATION RATE: 23 BRPM

## 2018-12-23 DIAGNOSIS — R10.13 EPIGASTRIC ABDOMINAL PAIN: Primary | ICD-10-CM

## 2018-12-23 LAB
ALBUMIN SERPL BCP-MCNC: 3.6 G/DL (ref 3.5–5)
ALP SERPL-CCNC: 52 U/L (ref 46–116)
ALT SERPL W P-5'-P-CCNC: 105 U/L (ref 12–78)
ANION GAP SERPL CALCULATED.3IONS-SCNC: 12 MMOL/L (ref 4–13)
AST SERPL W P-5'-P-CCNC: 232 U/L (ref 5–45)
BASOPHILS # BLD AUTO: 0.01 THOUSANDS/ΜL (ref 0–0.1)
BASOPHILS NFR BLD AUTO: 0 % (ref 0–1)
BILIRUB SERPL-MCNC: 0.5 MG/DL (ref 0.2–1)
BUN SERPL-MCNC: 17 MG/DL (ref 5–25)
CALCIUM SERPL-MCNC: 8.6 MG/DL (ref 8.3–10.1)
CHLORIDE SERPL-SCNC: 99 MMOL/L (ref 100–108)
CO2 SERPL-SCNC: 29 MMOL/L (ref 21–32)
CREAT SERPL-MCNC: 0.79 MG/DL (ref 0.6–1.3)
EOSINOPHIL # BLD AUTO: 0.01 THOUSAND/ΜL (ref 0–0.61)
EOSINOPHIL NFR BLD AUTO: 0 % (ref 0–6)
ERYTHROCYTE [DISTWIDTH] IN BLOOD BY AUTOMATED COUNT: 11.3 % (ref 11.6–15.1)
GFR SERPL CREATININE-BSD FRML MDRD: 86 ML/MIN/1.73SQ M
GLUCOSE SERPL-MCNC: 117 MG/DL (ref 65–140)
HCT VFR BLD AUTO: 41.8 % (ref 34.8–46.1)
HGB BLD-MCNC: 13.6 G/DL (ref 11.5–15.4)
IMM GRANULOCYTES # BLD AUTO: 0.01 THOUSAND/UL (ref 0–0.2)
IMM GRANULOCYTES NFR BLD AUTO: 0 % (ref 0–2)
LIPASE SERPL-CCNC: 148 U/L (ref 73–393)
LYMPHOCYTES # BLD AUTO: 1.27 THOUSANDS/ΜL (ref 0.6–4.47)
LYMPHOCYTES NFR BLD AUTO: 31 % (ref 14–44)
MCH RBC QN AUTO: 31 PG (ref 26.8–34.3)
MCHC RBC AUTO-ENTMCNC: 32.5 G/DL (ref 31.4–37.4)
MCV RBC AUTO: 95 FL (ref 82–98)
MONOCYTES # BLD AUTO: 0.28 THOUSAND/ΜL (ref 0.17–1.22)
MONOCYTES NFR BLD AUTO: 7 % (ref 4–12)
NEUTROPHILS # BLD AUTO: 2.56 THOUSANDS/ΜL (ref 1.85–7.62)
NEUTS SEG NFR BLD AUTO: 62 % (ref 43–75)
NRBC BLD AUTO-RTO: 0 /100 WBCS
PLATELET # BLD AUTO: 188 THOUSANDS/UL (ref 149–390)
PMV BLD AUTO: 9.8 FL (ref 8.9–12.7)
POTASSIUM SERPL-SCNC: 4.1 MMOL/L (ref 3.5–5.3)
PROT SERPL-MCNC: 7 G/DL (ref 6.4–8.2)
RBC # BLD AUTO: 4.39 MILLION/UL (ref 3.81–5.12)
SODIUM SERPL-SCNC: 140 MMOL/L (ref 136–145)
TROPONIN I SERPL-MCNC: <0.02 NG/ML
WBC # BLD AUTO: 4.14 THOUSAND/UL (ref 4.31–10.16)

## 2018-12-23 PROCEDURE — 71045 X-RAY EXAM CHEST 1 VIEW: CPT

## 2018-12-23 PROCEDURE — 80053 COMPREHEN METABOLIC PANEL: CPT | Performed by: PHYSICIAN ASSISTANT

## 2018-12-23 PROCEDURE — 36415 COLL VENOUS BLD VENIPUNCTURE: CPT | Performed by: PHYSICIAN ASSISTANT

## 2018-12-23 PROCEDURE — 85025 COMPLETE CBC W/AUTO DIFF WBC: CPT | Performed by: PHYSICIAN ASSISTANT

## 2018-12-23 PROCEDURE — 84484 ASSAY OF TROPONIN QUANT: CPT | Performed by: PHYSICIAN ASSISTANT

## 2018-12-23 PROCEDURE — 99284 EMERGENCY DEPT VISIT MOD MDM: CPT

## 2018-12-23 PROCEDURE — 83690 ASSAY OF LIPASE: CPT | Performed by: PHYSICIAN ASSISTANT

## 2018-12-23 PROCEDURE — 93005 ELECTROCARDIOGRAM TRACING: CPT

## 2018-12-23 RX ORDER — SUCRALFATE 1 G/1
1 TABLET ORAL EVERY 6 HOURS PRN
Qty: 20 TABLET | Refills: 0 | Status: SHIPPED | OUTPATIENT
Start: 2018-12-23 | End: 2022-07-08 | Stop reason: SDUPTHER

## 2018-12-23 NOTE — ED PROVIDER NOTES
History  Chief Complaint   Patient presents with    Epigastric Pain     Patient presents to the ER stating she started with epigastric pain suddenly while playing with grandchild  48 yo female w/ hx of anxiety and HTN presents to the Emergency Department for evaluation of acute onset epigastric/chest pain beginning 15-20 mins PTA  Pt states she was playing with her daughter on the ground when pain began, has not changed since onset  She has never experienced similar pain  Did eat macaroni and cheese prior to pain onset  Pain is moderate, non radiating without modifying factors  No associated fever, chills, sweats, cough, SOB, N/V, leg swelling or extremity paresthesias  Chewed a 325mg asa capsule prior to arrival  She does report EtOH and tobacco intake last PM    Has significant Fmhx of CVD, brother had MI age 34  Prior to Admission Medications   Prescriptions Last Dose Informant Patient Reported? Taking? B Complex Vitamins (VITAMIN B COMPLEX PO)   Yes No   Sig: Place under the tongue   Calcium Carbonate-Vit D-Min (CALCIUM 1200 PO)   Yes No   Sig: Take 1 capsule by mouth 4 (four) times a day   LORazepam (ATIVAN) 0 5 mg tablet   No No   Sig: Take 1 tablet (0 5 mg total) by mouth every 8 (eight) hours as needed for anxiety 1-2 tabs  Tid, prn anxiety  Multiple Vitamin-Folic Acid TABS   Yes No   Sig: Take 1 tablet by mouth daily   TRINTELLIX 10 MG tablet   No No   Sig: TAKE 1 TABLET BY MOUTH EVERY DAY   acetaminophen (TYLENOL) 500 mg tablet   No No   Sig: Take 2 tablets (1,000 mg total) by mouth 3 (three) times a day   cloNIDine (CATAPRES) 0 1 mg tablet   No No   Sig: TAKE 1 TABLET (0 1 MG TOTAL) BY MOUTH DAILY AT BEDTIME   ergocalciferol (VITAMIN D2) 50,000 units   No No   Sig: Take 1 capsule (50,000 Units total) by mouth once a week   fluticasone (FLONASE) 50 mcg/act nasal spray   No No   Si sprays into each nostril daily Use daily for 1-2 weeks     lisinopril (ZESTRIL) 10 mg tablet   No No Sig: TAKE 1 TABLET BY MOUTH ONCE DAILY   triamterene-hydrochlorothiazide (DYAZIDE) 37 5-25 mg per capsule   No No   Sig: TAKE 1 TABLET BY MOUTH DAILY   verapamil (CALAN) 40 mg tablet   No No   Sig: Take 1 tablet (40 mg total) by mouth daily      Facility-Administered Medications: None       Past Medical History:   Diagnosis Date    Anxiety     Hypertension     Reversible cerebrovascular vasoconstriction syndrome     Tremor        Past Surgical History:   Procedure Laterality Date    CHOLECYSTECTOMY      GASTRIC BYPASS  12/08/2000    for morbid obesity, last assessed 4/27/16       Family History   Problem Relation Age of Onset    Hypertension Mother     Diabetes Mother     Hypertension Father     Heart disease Brother     Heart attack Brother     Substance Abuse Brother     Heart failure Brother     Diabetes Brother     Hypertension Brother     Substance Abuse Sister     Substance Abuse Family     Mental illness Neg Hx         neg fam hx     I have reviewed and agree with the history as documented  Social History   Substance Use Topics    Smoking status: Former Smoker    Smokeless tobacco: Never Used      Comment: social smoker, quit 2017 / never a smoker denied     Alcohol use No      Comment: Social drinker per [employer]        Review of Systems   Constitutional: Negative for chills, diaphoresis and fever  Respiratory: Negative for chest tightness and shortness of breath  Cardiovascular: Negative for chest pain and palpitations  Gastrointestinal: Positive for abdominal pain (epigastric)  Negative for blood in stool, constipation, diarrhea, nausea and vomiting  Genitourinary: Negative for dysuria, flank pain, frequency and hematuria  Musculoskeletal: Negative for arthralgias, back pain and myalgias  Skin: Negative for color change and rash  Allergic/Immunologic: Negative for immunocompromised state     Neurological: Negative for dizziness, weakness, light-headedness and headaches  Hematological: Does not bruise/bleed easily  Psychiatric/Behavioral: Negative for confusion  Physical Exam  Physical Exam   Constitutional: She is oriented to person, place, and time  She appears well-developed and well-nourished  No distress  HENT:   Head: Normocephalic and atraumatic  Mouth/Throat: Oropharynx is clear and moist    Eyes: Pupils are equal, round, and reactive to light  No scleral icterus  Neck: No JVD present  Cardiovascular: Normal rate and regular rhythm  Exam reveals no gallop and no friction rub  No murmur heard  Pulses:       Radial pulses are 2+ on the right side, and 2+ on the left side  Dorsalis pedis pulses are 2+ on the right side, and 2+ on the left side  Pulmonary/Chest: No respiratory distress  She has no wheezes  She has no rales  Abdominal: Soft  Bowel sounds are normal  She exhibits no distension and no mass  There is tenderness (epigastric, mild)  There is no guarding  Musculoskeletal: She exhibits no edema (negative pretibial)  Neurological: She is alert and oriented to person, place, and time  Skin: Skin is warm and dry  Capillary refill takes less than 2 seconds  She is not diaphoretic  Psychiatric: She has a normal mood and affect  Her behavior is normal    Vitals reviewed        Vital Signs  ED Triage Vitals [12/23/18 1534]   Temperature Pulse Respirations Blood Pressure SpO2   (!) 97 4 °F (36 3 °C) 80 20 (!) 187/86 100 %      Temp Source Heart Rate Source Patient Position - Orthostatic VS BP Location FiO2 (%)   Tympanic Monitor Lying Right arm --      Pain Score       8           Vitals:    12/23/18 1534 12/23/18 1630   BP: (!) 187/86 (!) 180/80   Pulse: 80 78   Patient Position - Orthostatic VS: Lying        Visual Acuity      ED Medications  Medications - No data to display    Diagnostic Studies  Results Reviewed     Procedure Component Value Units Date/Time    Troponin I [[id number]]  (Normal) Collected:  12/23/18 1538 Lab Status:  Final result Specimen:  Blood from Arm, Left Updated:  12/23/18 1605     Troponin I <0 02 ng/mL     Comprehensive metabolic panel [[id number]]  (Abnormal) Collected:  12/23/18 1538    Lab Status:  Final result Specimen:  Blood from Arm, Left Updated:  12/23/18 1604     Sodium 140 mmol/L      Potassium 4 1 mmol/L      Chloride 99 (L) mmol/L      CO2 29 mmol/L      ANION GAP 12 mmol/L      BUN 17 mg/dL      Creatinine 0 79 mg/dL      Glucose 117 mg/dL      Calcium 8 6 mg/dL       (H) U/L       (H) U/L      Alkaline Phosphatase 52 U/L      Total Protein 7 0 g/dL      Albumin 3 6 g/dL      Total Bilirubin 0 50 mg/dL      eGFR 86 ml/min/1 73sq m     Narrative:         National Kidney Disease Education Program recommendations are as follows:  GFR calculation is accurate only with a steady state creatinine  Chronic Kidney disease less than 60 ml/min/1 73 sq  meters  Kidney failure less than 15 ml/min/1 73 sq  meters      Lipase [[id number]]  (Normal) Collected:  12/23/18 1538    Lab Status:  Final result Specimen:  Blood from Arm, Left Updated:  12/23/18 1604     Lipase 148 u/L     CBC and differential [[id number]]  (Abnormal) Collected:  12/23/18 1538    Lab Status:  Final result Specimen:  Blood from Arm, Left Updated:  12/23/18 1547     WBC 4 14 (L) Thousand/uL      RBC 4 39 Million/uL      Hemoglobin 13 6 g/dL      Hematocrit 41 8 %      MCV 95 fL      MCH 31 0 pg      MCHC 32 5 g/dL      RDW 11 3 (L) %      MPV 9 8 fL      Platelets 329 Thousands/uL      nRBC 0 /100 WBCs      Neutrophils Relative 62 %      Immat GRANS % 0 %      Lymphocytes Relative 31 %      Monocytes Relative 7 %      Eosinophils Relative 0 %      Basophils Relative 0 %      Neutrophils Absolute 2 56 Thousands/µL      Immature Grans Absolute 0 01 Thousand/uL      Lymphocytes Absolute 1 27 Thousands/µL      Monocytes Absolute 0 28 Thousand/µL      Eosinophils Absolute 0 01 Thousand/µL      Basophils Absolute 0 01 Thousands/µL XR chest 1 view portable   ED Interpretation by Raeann Gowers, PA-C (12/23 1600)   No acute cardiopulmonary disease                 Procedures  ECG 12 Lead Documentation  Date/Time: 12/23/2018 3:35 PM  Performed by: James Nunez  Authorized by: James Nunez     Indications / Diagnosis:  Epigastric pain  ECG reviewed by me, the ED Provider: yes    Patient location:  ED  Previous ECG:     Previous ECG:  Unavailable  Interpretation:     Interpretation: normal    Rate:     ECG rate:  66    ECG rate assessment: normal    Rhythm:     Rhythm: sinus rhythm    Ectopy:     Ectopy: none    QRS:     QRS axis:  Normal    QRS intervals:  Normal  Conduction:     Conduction: normal    ST segments:     ST segments:  Normal  T waves:     T waves: normal             Phone Contacts  ED Phone Contact    ED Course         HEART Risk Score      Most Recent Value   History  0 Filed at: 12/23/2018 1636   ECG  0 Filed at: 12/23/2018 1636   Age  1 Filed at: 12/23/2018 1636   Risk Factors  2 Filed at: 12/23/2018 1636   Troponin  0 Filed at: 12/23/2018 1636   Heart Score Risk Calculator   History  0 Filed at: 12/23/2018 1636   ECG  0 Filed at: 12/23/2018 1636   Age  1 Filed at: 12/23/2018 1636   Risk Factors  2 Filed at: 12/23/2018 1636   Troponin  0 Filed at: 12/23/2018 1636   HEART Score  3 Filed at: 12/23/2018 1636   HEART Score  3 Filed at: 12/23/2018 1636                            MDM  Number of Diagnoses or Management Options  Epigastric abdominal pain: new and requires workup  Diagnosis management comments: 48 yo female presents w/ acute epigastric pain beginning shortly after eating  Her pain is reproducible  She does have cardiac risk factors, however the presentation is not c/w ACS  Her pain resolved without intervention while in the ED  Her EKG is non ischemic, CXR is normal and labs are unremarkable  She does have elevated LFTs consistent with her report of EtOH intake last night   Given her risk factors for CAD, I have referred her for outpatient stress test  I do not feel strongly that she needs to be admitted as pain has resolved, and the patient wishes to go home  She verbalized understanding of all risks regarding ongoing chest pain and is recommended to return to the ED immediately if pain returns  Amount and/or Complexity of Data Reviewed  Clinical lab tests: ordered and reviewed  Tests in the radiology section of CPT®: ordered and reviewed  Tests in the medicine section of CPT®: ordered and reviewed  Review and summarize past medical records: yes  Independent visualization of images, tracings, or specimens: yes      CritCare Time    Disposition  Final diagnoses:   Epigastric abdominal pain     Time reflects when diagnosis was documented in both MDM as applicable and the Disposition within this note     Time User Action Codes Description Comment    12/23/2018  4:34 PM Shona Keen Add [R10 13] Epigastric abdominal pain       ED Disposition     ED Disposition Condition Comment    Discharge  Nick Caceres discharge to home/self care      Condition at discharge: Good        Follow-up Information     Follow up With Specialties Details Why Contact Info Additional Yves Goldstein PA-C Internal Medicine, Physician Assistant In 1 week  [place] [address] [address] Cardiology In 1 week  [address] [facility] [address]  [address]  Cardiology [phone], [address] [facility], [place], [place], [address]          Discharge Medication List as of 12/23/2018  4:37 PM      START taking these medications    Details   sucralfate (CARAFATE) 1 g tablet Take 1 tablet (1 g total) by mouth every 6 (six) hours as needed (pain), Starting Sun 12/23/2018, Print         CONTINUE these medications which have NOT CHANGED    Details   acetaminophen (TYLENOL) 500 mg tablet Take 2 tablets (1,000 mg total) by mouth 3 (three) times a day, Starting Sun 1/28/2018, Normal      B Complex Vitamins (VITAMIN B COMPLEX PO) Place under the tongue, Historical Med      Calcium Carbonate-Vit D-Min (CALCIUM 1200 PO) Take 1 capsule by mouth 4 (four) times a day, Historical Med      cloNIDine (CATAPRES) 0 1 mg tablet TAKE 1 TABLET (0 1 MG TOTAL) BY MOUTH DAILY AT BEDTIME, Starting Mon 12/10/2018, Normal      ergocalciferol (VITAMIN D2) 50,000 units Take 1 capsule (50,000 Units total) by mouth once a week, Starting Thu 10/11/2018, Normal      fluticasone (FLONASE) 50 mcg/act nasal spray 2 sprays into each nostril daily Use daily for 1-2 weeks  , Starting Fri 10/19/2018, Normal      lisinopril (ZESTRIL) 10 mg tablet TAKE 1 TABLET BY MOUTH ONCE DAILY, Normal      LORazepam (ATIVAN) 0 5 mg tablet Take 1 tablet (0 5 mg total) by mouth every 8 (eight) hours as needed for anxiety 1-2 tabs  Tid, prn anxiety  , Starting Mon 3/19/2018, Print      Multiple Vitamin-Folic Acid TABS Take 1 tablet by mouth daily, Historical Med      triamterene-hydrochlorothiazide (DYAZIDE) 37 5-25 mg per capsule TAKE 1 TABLET BY MOUTH DAILY, Starting Thu 10/25/2018, Normal      TRINTELLIX 10 MG tablet TAKE 1 TABLET BY MOUTH EVERY DAY, Normal      verapamil (CALAN) 40 mg tablet Take 1 tablet (40 mg total) by mouth daily, Starting Fri 11/2/2018, Normal             Outpatient Discharge Orders  Echo stress test w contrast if indicated   Standing Status: Future  Standing Exp   Date: 12/23/22         ED Provider  Electronically Signed by           Caroline Gonzales PA-C  12/23/18 6357

## 2018-12-23 NOTE — DISCHARGE INSTRUCTIONS
Epigastric Pain   WHAT YOU NEED TO KNOW:   Epigastric pain is felt in the middle of the upper abdomen, between the ribs and the bellybutton  The pain may be mild or severe  Pain may spread from or to another part of your body  Epigastric pain may be a sign of a serious health problem that needs to be treated  DISCHARGE INSTRUCTIONS:   Call 911 for any of the following:   · You have any of the following signs of a heart attack:      ¨ Squeezing, pressure, or pain in your chest that lasts longer than 5 minutes or returns    ¨ Discomfort or pain in your back, neck, jaw, stomach, or arm     ¨ Trouble breathing    ¨ Nausea or vomiting    ¨ Lightheadedness or a sudden cold sweat, especially with chest pain or trouble breathing    · You have severe pain that radiates to your jaw or back  Return to the emergency department if:   · You have severe pain that starts suddenly and quickly gets worse  · You cannot have a bowel movement and are vomiting  · You vomit or cough up blood  · You see blood in your urine or bowel movement  · You feel drowsy and your breathing is slower than usual   Contact your healthcare provider if:   · You have a fever or chills  · You have yellowing of your skin or the whites of your eyes  · You vomit often or several times in a row  · You lose weight without trying  · You have symptoms for longer than 2 weeks  · You have questions or concerns about your condition or care  Medicines:   · Medicines  may be given to treat pain or stop vomiting  You may also need medicines to reduce or control stomach acid, or treat an infection  · Take your medicine as directed  Contact your healthcare provider if you think your medicine is not helping or if you have side effects  Tell him of her if you are allergic to any medicine  Keep a list of the medicines, vitamins, and herbs you take  Include the amounts, and when and why you take them   Bring the list or the pill bottles to follow-up visits  Carry your medicine list with you in case of an emergency  Follow up with your healthcare provider as directed:  Write down your questions so you remember to ask them during your visits  Manage your symptoms:   · Keep a record of your symptoms  Include when the pain starts, how long it lasts, and if it is sharp or dull  Also include any foods you ate or activities you did before the pain started  Keep track of anything that helped the pain  · Eat a variety of healthy foods  Healthy foods include fruits, vegetables, whole-grain breads, low-fat dairy products, beans, lean meats, and fish  Ask if you need to be on a special diet  Certain foods may cause your pain, such as alcohol or foods that are high in fat  You may need to eat smaller meals and to eat more often than usual     · Drink liquids as directed  Ask how much liquid to drink each day and which liquids are best for you  Do not have drinks that contain alcohol or caffeine  © 2017 [address] [employer] Information is for End User's use only and may not be sold, redistributed or otherwise used for commercial purposes  All illustrations and images included in CareNotes® are the copyrighted property of [employer]  or [employer] He[employer]  The above information is an  only  It is not intended as medical advice for individual conditions or treatments  Talk to your doctor, nurse or pharmacist before following any medical regimen to see if it is safe and effective for you

## 2018-12-24 LAB
ATRIAL RATE: 66 BPM
P AXIS: 44 DEGREES
PR INTERVAL: 158 MS
QRS AXIS: 37 DEGREES
QRSD INTERVAL: 82 MS
QT INTERVAL: 396 MS
QTC INTERVAL: 415 MS
T WAVE AXIS: 46 DEGREES
VENTRICULAR RATE: 66 BPM

## 2018-12-24 PROCEDURE — 93010 ELECTROCARDIOGRAM REPORT: CPT | Performed by: INTERNAL MEDICINE

## 2019-01-17 ENCOUNTER — OFFICE VISIT (OUTPATIENT)
Dept: FAMILY MEDICINE CLINIC | Facility: HOSPITAL | Age: 54
End: 2019-01-17
Payer: COMMERCIAL

## 2019-01-17 VITALS
RESPIRATION RATE: 18 BRPM | SYSTOLIC BLOOD PRESSURE: 110 MMHG | HEART RATE: 64 BPM | HEIGHT: 66 IN | WEIGHT: 218 LBS | BODY MASS INDEX: 35.03 KG/M2 | DIASTOLIC BLOOD PRESSURE: 78 MMHG

## 2019-01-17 DIAGNOSIS — E16.2 HYPOGLYCEMIA: ICD-10-CM

## 2019-01-17 DIAGNOSIS — I67.841 REVERSIBLE CEREBROVASCULAR VASOCONSTRICTION SYNDROME: ICD-10-CM

## 2019-01-17 DIAGNOSIS — Z98.84 HX OF GASTRIC BYPASS: ICD-10-CM

## 2019-01-17 DIAGNOSIS — R55 SYNCOPE, UNSPECIFIED SYNCOPE TYPE: Primary | ICD-10-CM

## 2019-01-17 PROCEDURE — 99214 OFFICE O/P EST MOD 30 MIN: CPT | Performed by: PHYSICIAN ASSISTANT

## 2019-01-17 RX ORDER — VERAPAMIL HYDROCHLORIDE 40 MG/1
40 TABLET ORAL DAILY
Qty: 30 TABLET | Refills: 0 | COMMUNITY
Start: 2019-01-17 | End: 2019-02-15

## 2019-01-17 RX ORDER — DIPHENHYDRAMINE HYDROCHLORIDE 25 MG/1
CAPSULE, LIQUID FILLED ORAL 2 TIMES DAILY
Qty: 50 EACH | Refills: 2 | Status: SHIPPED | OUTPATIENT
Start: 2019-01-17 | End: 2019-11-21 | Stop reason: CLARIF

## 2019-01-17 NOTE — PATIENT INSTRUCTIONS
Reviewed patient's ER records and testing, blood and images  Recommend patient keep glucose tabs  Or protein bars with her at all times  Recommend monitoring glucose and continuing with blood pressure monitoring  Take 1/2 pill of Verapamil  Discussed meal planning, and glucose goals, and blood pressure/pulse goals  Follow up with neurology, and referred to nutritionist and cardiology

## 2019-01-17 NOTE — PROGRESS NOTES
Assessment/Plan:         Diagnoses and all orders for this visit:    HTN- with fluctuating blood pressure  Syncope, unspecified syncope type  -     Ambulatory referral to Cardiology; Future  -     Ambulatory referral to Nutrition Services; Future      Hypoglycemia  -     Blood Glucose Monitoring Suppl (BLOOD GLUCOSE MONITOR SYSTEM) w/Device KIT; by Does not apply route 2 (two) times a day  -     Ambulatory referral to Nutrition Services; Future    Hx of gastric bypass-  Suspect malabsorption   -     Ambulatory referral to Nutrition Services; Future        Subjective:      Patient ID: Brittany Mcdonald is a 47 y o  female  47year old white female presents for follow up from 2 recent ER visits  Recent [facility] visit, had syncopal episode, and glucose was 59  Has been monitoring blood pressure, has been fluctuating, stopped blood pressure meds  , pulse below 60 in morning, was in 50's  Presents with partner, who is very concerned about patient's condition  In ER-  Had chest x-ray, CT of head, EKG and blood work  ER visit in December liver enzymes were elevated, but recent [facility] ER visit in January liver enzymes were wnl  Patient does admit to drinking alcohol on occasion few or more drinks  Concerned about fluctuating glucose, and blood pressure  Tries to eat healthy, does the cooking at home  Prior to ER visit at [place] [address], she ate a full meal, and snacks/popcorn, and had syncopal episode at movie, within 2 hours of eating, glucose checked was 59????? Review of Systems      Objective:      /78   Pulse 64   Resp 18   Ht 5' 6" (1 676 m)   Wt 98 9 kg (218 lb)   LMP 12/21/2016   BMI 35 19 kg/m²          Physical Exam   Constitutional: She is oriented to person, place, and time  She appears well-developed and well-nourished  No distress  HENT:   Head: Normocephalic and atraumatic  Eyes: Conjunctivae and EOM are normal  Right eye exhibits no discharge  Left eye exhibits no discharge  No scleral icterus  Cardiovascular: Normal rate, regular rhythm and normal heart sounds  Pulmonary/Chest: Effort normal and breath sounds normal  No respiratory distress  She has no wheezes  She has no rales  Musculoskeletal: Normal range of motion  She exhibits no edema  Neurological: She is alert and oriented to person, place, and time  No cranial nerve deficit  Coordination normal    Skin: She is not diaphoretic  Psychiatric: She has a normal mood and affect  Her behavior is normal  Judgment and thought content normal    Nursing note and vitals reviewed

## 2019-01-18 ENCOUNTER — TELEPHONE (OUTPATIENT)
Dept: FAMILY MEDICINE CLINIC | Facility: HOSPITAL | Age: 54
End: 2019-01-18

## 2019-01-18 DIAGNOSIS — E16.2 HYPOGLYCEMIA: Primary | ICD-10-CM

## 2019-01-22 DIAGNOSIS — I10 ESSENTIAL HYPERTENSION: Chronic | ICD-10-CM

## 2019-01-23 RX ORDER — TRIAMTERENE AND HYDROCHLOROTHIAZIDE 37.5; 25 MG/1; MG/1
CAPSULE ORAL DAILY
Qty: 30 CAPSULE | Refills: 2 | Status: SHIPPED | OUTPATIENT
Start: 2019-01-23 | End: 2019-03-21 | Stop reason: SDUPTHER

## 2019-01-29 NOTE — PROGRESS NOTES
Cardiology Consultation     Jalil Astudillo  [id number]  1965  HEART & VASCULAR  [facility] Alberto Martinez  [address] [place] [address]  1  Syncope, unspecified syncope type  Echo complete with contrast if indicated    Holter monitor - 48 hour   2  Essential (primary) hypertension  Echo complete with contrast if indicated    Holter monitor - 48 hour     Patient Active Problem List   Diagnosis    Hypertension    History of gastric bypass    SAH (subarachnoid hemorrhage) ([address] )    Obesity due to excess calories    Reversible cerebrovascular vasoconstriction syndrome    Other headache syndrome    Tremor, essential       HPI patient is here for a cardiology evaluation  She has a history of hypertension  She has had issues with syncope  She was seen in the emergency room in December in reference to epigastric and chest discomfort  At that time her liver enzymes were found to be elevated  In EKG on December 23rd was found to be normal   A stress echocardiogram was ordered but never done  She had another visit at [facility] in reference to a syncopal episode  Her blood sugar was found to be 59  EKG on January 11th demonstrated sinus bradycardia, HR = 58,  and otherwise unremarkable tracing  Cardiac enzymes were negative  She apparently had been at a movie and went to the bathroom and had an unwitnessed episode  Patient has a prior history of hypertension with a small right-sided subarachnoid hemorrhage noted on a study done January 24, 2018  CT of the head done January 11th showed no acute intracranial hemorrhage  In reference to hypertension the patient is medicated  Patient is followed by Neurology in reference to RCVS (reversible cerebral vaso constriction syndrome)  This was felt to be related to SSRI rx which she is now off  She is feeling well today    An EKG done December 23rd demonstrated sinus rhythm and looked normal   There is concern in reference to a cardiac etiology for this most recent episode although with this is unclear to me  She had had some alcohol and apparently the blood sugar was low as noted  He is on a tiny dose of verapamil now at 20 mg per day in reference to her prior brain bleed  This is being weaned off  PMH-  Past Medical History:   Diagnosis Date    Anxiety     Hypertension     Reversible cerebrovascular vasoconstriction syndrome     Tremor         SOCIAL HISTORY-  Social History     Social History    Marital status: /Civil Union     Spouse name: N/A    Number of children: N/A    Years of education: N/A     Occupational History    Not on file       Social History Main Topics    Smoking status: Former Smoker    Smokeless tobacco: Never Used      Comment: social smoker, quit 2017 / never a smoker denied     Alcohol use No      Comment: Social drinker per [employer]    Drug use: No    Sexual activity: Not on file     Other Topics Concern    Not on file     Social History Narrative    Wears seatbelt    Denies exercise habits    Good dental hygiene    Lives with friend     Living situation: supportive and safe     No advance directives        FAMILY HISTORY-  Family History   Problem Relation Age of Onset    Hypertension Mother     Diabetes Mother     Hypertension Father     Heart disease Brother     Heart attack Brother     Substance Abuse Brother     Heart failure Brother     Diabetes Brother     Hypertension Brother     Substance Abuse Sister     Substance Abuse Family     Mental illness Neg Hx         neg fam hx       SURGICAL HISTORY-  Past Surgical History:   Procedure Laterality Date    CHOLECYSTECTOMY      GASTRIC BYPASS  12/08/2000    for morbid obesity, last assessed 4/27/16         Current Outpatient Prescriptions:     acetaminophen (TYLENOL) 500 mg tablet, Take 2 tablets (1,000 mg total) by mouth 3 (three) times a day, Disp: 180 tablet, Rfl: 0    B Complex Vitamins (VITAMIN B COMPLEX PO), Place under the tongue, Disp: , Rfl:     Blood Glucose Monitoring Suppl (BLOOD GLUCOSE MONITOR SYSTEM) w/Device KIT, by Does not apply route 2 (two) times a day, Disp: 50 each, Rfl: 2    Calcium Carbonate-Vit D-Min (CALCIUM 1200 PO), Take 1 capsule by mouth 4 (four) times a day, Disp: , Rfl:     cloNIDine (CATAPRES) 0 1 mg tablet, TAKE 1 TABLET (0 1 MG TOTAL) BY MOUTH DAILY AT BEDTIME, Disp: 30 tablet, Rfl: 2    glucose blood test strip, Use as instructed, Disp: 60 each, Rfl: 3    lisinopril (ZESTRIL) 10 mg tablet, TAKE 1 TABLET BY MOUTH ONCE DAILY, Disp: 30 tablet, Rfl: 5    LORazepam (ATIVAN) 0 5 mg tablet, Take 1 tablet (0 5 mg total) by mouth every 8 (eight) hours as needed for anxiety 1-2 tabs  Tid, prn anxiety  , Disp: 90 tablet, Rfl: 2    Multiple Vitamin-Folic Acid TABS, Take 1 tablet by mouth daily, Disp: , Rfl:     triamterene-hydrochlorothiazide (DYAZIDE) 37 5-25 mg per capsule, TAKE 1 TABLET BY MOUTH DAILY, Disp: 30 capsule, Rfl: 2    TRINTELLIX 10 MG tablet, TAKE 1 TABLET BY MOUTH EVERY DAY, Disp: 30 tablet, Rfl: 3    verapamil (CALAN) 40 mg tablet, Take 1 tablet (40 mg total) by mouth daily, Disp: 30 tablet, Rfl: 0    sucralfate (CARAFATE) 1 g tablet, Take 1 tablet (1 g total) by mouth every 6 (six) hours as needed (pain) (Patient not taking: Reported on 1/30/2019 ), Disp: 20 tablet, Rfl: 0  No Known Allergies  Vitals:    01/30/19 1127   BP: 118/72   BP Location: Left arm   Patient Position: Sitting   Cuff Size: Large   Pulse: 56   Weight: 98 4 kg (217 lb)   Height: 5' 6" (1 676 m)         Review of Systems:  Review of Systems   Neurological:        Syncope   All other systems reviewed and are negative  Physical Exam:  Physical Exam   Constitutional: She is oriented to person, place, and time  She appears well-developed and well-nourished  HENT:   Head: Normocephalic and atraumatic  Eyes: Pupils are equal, round, and reactive to light  Conjunctivae and EOM are normal    Neck: Normal range of motion  Neck supple  Cardiovascular: Normal rate and normal heart sounds  Pulmonary/Chest: Effort normal and breath sounds normal    Neurological: She is alert and oriented to person, place, and time  Skin: Skin is warm and dry  Psychiatric: She has a normal mood and affect  Vitals reviewed  Discussion/Summary: At this point the etiology of her episode is unclear  To exclude cardiac etiology I will check an echocardiogram and a 48 hour Holter monitor  It may be a constellation of things including alcohol and low blood sugar in the setting of multiple anti hypertensives  We will assess testing and I will have her come back for further follow-up  I have asked her to call in the interim if there is a problem otherwise I will see her at the follow-up visit  Her partner was in attendance today

## 2019-01-30 ENCOUNTER — CONSULT (OUTPATIENT)
Dept: CARDIOLOGY CLINIC | Facility: CLINIC | Age: 54
End: 2019-01-30
Payer: COMMERCIAL

## 2019-01-30 VITALS
HEIGHT: 66 IN | DIASTOLIC BLOOD PRESSURE: 72 MMHG | HEART RATE: 56 BPM | WEIGHT: 217 LBS | SYSTOLIC BLOOD PRESSURE: 118 MMHG | BODY MASS INDEX: 34.87 KG/M2

## 2019-01-30 DIAGNOSIS — R55 SYNCOPE, UNSPECIFIED SYNCOPE TYPE: Primary | ICD-10-CM

## 2019-01-30 DIAGNOSIS — I10 ESSENTIAL (PRIMARY) HYPERTENSION: ICD-10-CM

## 2019-01-30 PROCEDURE — 99204 OFFICE O/P NEW MOD 45 MIN: CPT | Performed by: INTERNAL MEDICINE

## 2019-01-30 NOTE — PATIENT INSTRUCTIONS
I will schedule a echocardiogram and Holter monitor  Please call if there is a problem in the interim  I will see you at the follow-up visit

## 2019-02-12 ENCOUNTER — TELEPHONE (OUTPATIENT)
Dept: NEUROLOGY | Facility: CLINIC | Age: 54
End: 2019-02-12

## 2019-02-13 ENCOUNTER — HOSPITAL ENCOUNTER (OUTPATIENT)
Dept: NON INVASIVE DIAGNOSTICS | Facility: CLINIC | Age: 54
Discharge: HOME | End: 2019-02-13
Payer: COMMERCIAL

## 2019-02-13 DIAGNOSIS — I10 ESSENTIAL (PRIMARY) HYPERTENSION: ICD-10-CM

## 2019-02-13 DIAGNOSIS — R55 SYNCOPE, UNSPECIFIED SYNCOPE TYPE: ICD-10-CM

## 2019-02-13 PROCEDURE — 93306 TTE W/DOPPLER COMPLETE: CPT

## 2019-02-13 PROCEDURE — 93225 XTRNL ECG REC<48 HRS REC: CPT

## 2019-02-13 PROCEDURE — 93226 XTRNL ECG REC<48 HR SCAN A/R: CPT

## 2019-02-13 PROCEDURE — 93306 TTE W/DOPPLER COMPLETE: CPT | Performed by: INTERNAL MEDICINE

## 2019-02-15 ENCOUNTER — OFFICE VISIT (OUTPATIENT)
Dept: FAMILY MEDICINE CLINIC | Facility: HOSPITAL | Age: 54
End: 2019-02-15
Payer: COMMERCIAL

## 2019-02-15 VITALS
OXYGEN SATURATION: 99 % | BODY MASS INDEX: 35.36 KG/M2 | SYSTOLIC BLOOD PRESSURE: 112 MMHG | WEIGHT: 220 LBS | DIASTOLIC BLOOD PRESSURE: 80 MMHG | HEIGHT: 66 IN | HEART RATE: 57 BPM | TEMPERATURE: 98 F

## 2019-02-15 DIAGNOSIS — I10 ESSENTIAL HYPERTENSION: ICD-10-CM

## 2019-02-15 DIAGNOSIS — I67.841 REVERSIBLE CEREBROVASCULAR VASOCONSTRICTION SYNDROME: ICD-10-CM

## 2019-02-15 DIAGNOSIS — D53.8 OTHER SPECIFIED NUTRITIONAL ANEMIAS: ICD-10-CM

## 2019-02-15 DIAGNOSIS — G44.021 INTRACTABLE CHRONIC CLUSTER HEADACHE: ICD-10-CM

## 2019-02-15 DIAGNOSIS — K91.2 HYPOGLYCEMIA AFTER GI (GASTROINTESTINAL) SURGERY: Primary | ICD-10-CM

## 2019-02-15 PROCEDURE — 3008F BODY MASS INDEX DOCD: CPT | Performed by: PHYSICIAN ASSISTANT

## 2019-02-15 PROCEDURE — 99214 OFFICE O/P EST MOD 30 MIN: CPT | Performed by: PHYSICIAN ASSISTANT

## 2019-02-15 PROCEDURE — 1036F TOBACCO NON-USER: CPT | Performed by: PHYSICIAN ASSISTANT

## 2019-02-15 RX ORDER — VERAPAMIL HYDROCHLORIDE 40 MG/1
20 TABLET ORAL DAILY
Qty: 30 TABLET | Refills: 0 | COMMUNITY
Start: 2019-02-15 | End: 2019-02-15 | Stop reason: SDUPTHER

## 2019-02-15 RX ORDER — LISINOPRIL 10 MG/1
10 TABLET ORAL
COMMUNITY
End: 2019-08-08 | Stop reason: SDUPTHER

## 2019-02-15 RX ORDER — VERAPAMIL HYDROCHLORIDE 40 MG/1
20 TABLET ORAL
COMMUNITY
End: 2019-02-20 | Stop reason: SDUPTHER

## 2019-02-15 NOTE — PATIENT INSTRUCTIONS
Discussed meal planning, and causes of hypoglycemia, recommend increasing protein in diet and snacks  Continue to monitor glucose, and blood pressure, encouraged to see nutritionist     Rosalba Moses to repeat blood test in 3 months, then return to office, or sooner prn

## 2019-02-15 NOTE — PROGRESS NOTES
Assessment/Plan:         Diagnoses and all orders for this visit:    Hypoglycemia after GI (gastrointestinal) surgery  -     Glucometer test strips  -     CBC and differential; Future  -     Comprehensive metabolic panel; Future  -     Hemoglobin A1C; Future  -     Insulin, fasting; Future  -     Magnesium; Future  -     Vitamin B12; Future  -     Vitamin D 25 hydroxy; Future  -     Iron; Future  -     T4, free; Future  -     TSH, 3rd generation; Future    Reversible cerebrovascular vasoconstriction syndrome  -     Discontinue: verapamil (CALAN) 40 mg tablet; Take 0 5 tablets (20 mg total) by mouth daily  -     CBC and differential; Future  -     Comprehensive metabolic panel; Future  -     Hemoglobin A1C; Future  -     Insulin, fasting; Future  -     Magnesium; Future  -     Vitamin B12; Future  -     Vitamin D 25 hydroxy; Future  -     Iron; Future  -     T4, free; Future  -     TSH, 3rd generation; Future    Essential hypertension  -     CBC and differential; Future  -     Comprehensive metabolic panel; Future  -     Hemoglobin A1C; Future  -     Insulin, fasting; Future  -     Magnesium; Future  -     Vitamin B12; Future  -     Vitamin D 25 hydroxy; Future  -     Iron; Future  -     T4, free; Future  -     TSH, 3rd generation; Future    Other specified nutritional anemias  -     CBC and differential; Future  -     Comprehensive metabolic panel; Future  -     Hemoglobin A1C; Future  -     Insulin, fasting; Future  -     Magnesium; Future  -     Vitamin B12; Future  -     Vitamin D 25 hydroxy; Future  -     Iron; Future  -     T4, free; Future  -     TSH, 3rd generation; Future    Intractable chronic cluster headache  -     CBC and differential; Future  -     Comprehensive metabolic panel; Future  -     Hemoglobin A1C; Future  -     Insulin, fasting; Future  -     Magnesium; Future  -     Vitamin B12; Future  -     Vitamin D 25 hydroxy; Future  -     T4, free; Future  -     TSH, 3rd generation;  Future    Other orders  -     Nutritional Supplements (GLUCOSE MANAGEMENT) TABS; Take 16 g by mouth  -     lisinopril (ZESTRIL) 10 mg tablet; Take 10 mg by mouth  -     verapamil (CALAN) 40 mg tablet; Take 20 mg by mouth        Subjective:      Patient ID: Zarina Morris is a 47 y o  female  47year old white female presents with partner, for follow up  Very stressed, and does emotional eating  Had echo, and now doing holter  Admits to emotional eating, at night, ice cream, and snacks  Has been monitoring blood pressure-   111/73, 127/79, 110/66  Glucose-  51, 37, 158, 94, 83, 233, 49, 52, 112, 57, 49, 90, 68, 48, 87, 89, 73  Plans to see nutritionist           Review of Systems   Constitutional: Positive for fatigue  Negative for chills, diaphoresis and fever  Gastrointestinal: Negative for abdominal pain, constipation, diarrhea, nausea and vomiting  Musculoskeletal: Negative for back pain, myalgias, neck pain and neck stiffness  Neurological: Positive for tremors and headaches  Negative for dizziness, syncope and light-headedness  Psychiatric/Behavioral: Negative for sleep disturbance  The patient is nervous/anxious  Objective:      /80   Pulse 57   Temp 98 °F (36 7 °C)   Ht 5' 6" (1 676 m)   Wt 99 8 kg (220 lb)   LMP 12/21/2016   SpO2 99%   BMI 35 51 kg/m²          Physical Exam   Constitutional: She is oriented to person, place, and time  She appears well-developed and well-nourished  No distress  Eyes: Conjunctivae and EOM are normal  Right eye exhibits no discharge  Left eye exhibits no discharge  No scleral icterus  Cardiovascular: Normal rate, regular rhythm and normal heart sounds  Pulmonary/Chest: Effort normal and breath sounds normal  No stridor  No respiratory distress  She has no wheezes  She has no rales  Musculoskeletal: Normal range of motion  She exhibits no edema, tenderness or deformity     Neurological: She is alert and oriented to person, place, and time  No cranial nerve deficit  Coordination normal    Skin: She is not diaphoretic  Psychiatric: She has a normal mood and affect   Her behavior is normal  Judgment and thought content normal

## 2019-02-20 DIAGNOSIS — I67.841 REVERSIBLE CEREBROVASCULAR VASOCONSTRICTION SYNDROME: ICD-10-CM

## 2019-02-20 PROCEDURE — 93227 XTRNL ECG REC<48 HR R&I: CPT | Performed by: INTERNAL MEDICINE

## 2019-02-20 RX ORDER — VERAPAMIL HYDROCHLORIDE 40 MG/1
TABLET ORAL
Qty: 30 TABLET | Refills: 3 | Status: SHIPPED | OUTPATIENT
Start: 2019-02-20 | End: 2019-08-08 | Stop reason: ALTCHOICE

## 2019-03-13 DIAGNOSIS — I10 ESSENTIAL HYPERTENSION: ICD-10-CM

## 2019-03-13 RX ORDER — CLONIDINE HYDROCHLORIDE 0.1 MG/1
0.1 TABLET ORAL
Qty: 30 TABLET | Refills: 2 | Status: SHIPPED | OUTPATIENT
Start: 2019-03-13 | End: 2019-03-20 | Stop reason: SDUPTHER

## 2019-03-20 DIAGNOSIS — I10 ESSENTIAL HYPERTENSION: ICD-10-CM

## 2019-03-20 RX ORDER — CLONIDINE HYDROCHLORIDE 0.1 MG/1
0.1 TABLET ORAL
Qty: 90 TABLET | Refills: 2 | Status: SHIPPED | OUTPATIENT
Start: 2019-03-20 | End: 2019-05-17 | Stop reason: ALTCHOICE

## 2019-03-21 DIAGNOSIS — I10 ESSENTIAL HYPERTENSION: Chronic | ICD-10-CM

## 2019-03-22 RX ORDER — TRIAMTERENE AND HYDROCHLOROTHIAZIDE 37.5; 25 MG/1; MG/1
1 CAPSULE ORAL DAILY
Qty: 90 CAPSULE | Refills: 3 | Status: SHIPPED | OUTPATIENT
Start: 2019-03-22 | End: 2020-03-23

## 2019-05-04 DIAGNOSIS — F33.2 SEVERE EPISODE OF RECURRENT MAJOR DEPRESSIVE DISORDER, WITHOUT PSYCHOTIC FEATURES (HCC): ICD-10-CM

## 2019-05-06 RX ORDER — VORTIOXETINE 10 MG/1
TABLET, FILM COATED ORAL
Qty: 30 TABLET | Refills: 3 | Status: SHIPPED | OUTPATIENT
Start: 2019-05-06 | End: 2019-05-17

## 2019-05-17 ENCOUNTER — OFFICE VISIT (OUTPATIENT)
Dept: FAMILY MEDICINE CLINIC | Facility: HOSPITAL | Age: 54
End: 2019-05-17
Payer: COMMERCIAL

## 2019-05-17 VITALS
HEART RATE: 57 BPM | BODY MASS INDEX: 35.68 KG/M2 | WEIGHT: 222 LBS | SYSTOLIC BLOOD PRESSURE: 118 MMHG | HEIGHT: 66 IN | DIASTOLIC BLOOD PRESSURE: 70 MMHG

## 2019-05-17 DIAGNOSIS — R51.9 ACUTE INTRACTABLE HEADACHE, UNSPECIFIED HEADACHE TYPE: ICD-10-CM

## 2019-05-17 DIAGNOSIS — Z98.84 HISTORY OF GASTRIC BYPASS: Chronic | ICD-10-CM

## 2019-05-17 DIAGNOSIS — G25.0 TREMOR, ESSENTIAL: ICD-10-CM

## 2019-05-17 DIAGNOSIS — F33.2 SEVERE EPISODE OF RECURRENT MAJOR DEPRESSIVE DISORDER, WITHOUT PSYCHOTIC FEATURES (HCC): ICD-10-CM

## 2019-05-17 DIAGNOSIS — E16.1 REACTIVE HYPOGLYCEMIA: ICD-10-CM

## 2019-05-17 DIAGNOSIS — Z12.39 SCREENING FOR BREAST CANCER: Primary | ICD-10-CM

## 2019-05-17 DIAGNOSIS — E66.09 OBESITY DUE TO EXCESS CALORIES WITHOUT SERIOUS COMORBIDITY, UNSPECIFIED CLASSIFICATION: Chronic | ICD-10-CM

## 2019-05-17 PROCEDURE — 1036F TOBACCO NON-USER: CPT | Performed by: PHYSICIAN ASSISTANT

## 2019-05-17 PROCEDURE — 3008F BODY MASS INDEX DOCD: CPT | Performed by: PHYSICIAN ASSISTANT

## 2019-05-17 PROCEDURE — 99213 OFFICE O/P EST LOW 20 MIN: CPT | Performed by: PHYSICIAN ASSISTANT

## 2019-05-17 RX ORDER — LORAZEPAM 0.5 MG/1
0.5 TABLET ORAL EVERY 8 HOURS PRN
Qty: 90 TABLET | Refills: 2 | Status: SHIPPED | OUTPATIENT
Start: 2019-05-17 | End: 2019-05-17 | Stop reason: SDUPTHER

## 2019-05-17 RX ORDER — LORAZEPAM 0.5 MG/1
0.5 TABLET ORAL EVERY 8 HOURS PRN
Qty: 90 TABLET | Refills: 2 | Status: SHIPPED | OUTPATIENT
Start: 2019-05-17 | End: 2019-11-08 | Stop reason: ALTCHOICE

## 2019-06-17 DIAGNOSIS — I10 ESSENTIAL HYPERTENSION: ICD-10-CM

## 2019-06-17 RX ORDER — LISINOPRIL 10 MG/1
TABLET ORAL
Qty: 30 TABLET | Refills: 5 | Status: SHIPPED | OUTPATIENT
Start: 2019-06-17 | End: 2019-12-16 | Stop reason: SDUPTHER

## 2019-07-09 ENCOUNTER — OFFICE VISIT (OUTPATIENT)
Dept: URGENT CARE | Facility: CLINIC | Age: 54
End: 2019-07-09
Payer: COMMERCIAL

## 2019-07-09 VITALS
RESPIRATION RATE: 16 BRPM | OXYGEN SATURATION: 97 % | DIASTOLIC BLOOD PRESSURE: 90 MMHG | TEMPERATURE: 98.4 F | HEART RATE: 64 BPM | BODY MASS INDEX: 37.28 KG/M2 | HEIGHT: 66 IN | SYSTOLIC BLOOD PRESSURE: 144 MMHG | WEIGHT: 232 LBS

## 2019-07-09 DIAGNOSIS — S61.011A LACERATION OF RIGHT THUMB WITHOUT FOREIGN BODY WITHOUT DAMAGE TO NAIL, INITIAL ENCOUNTER: Primary | ICD-10-CM

## 2019-07-09 PROCEDURE — 99213 OFFICE O/P EST LOW 20 MIN: CPT | Performed by: PHYSICIAN ASSISTANT

## 2019-07-09 PROCEDURE — 12001 RPR S/N/AX/GEN/TRNK 2.5CM/<: CPT | Performed by: PHYSICIAN ASSISTANT

## 2019-07-09 NOTE — PATIENT INSTRUCTIONS
Area clean and dry-avoid submerging for the next several days  Follow-up with PCP tomorrow for tetanus injection  Any signs of infection such as redness, swelling, discharge or fever/chills follow-up with PCP or here

## 2019-07-09 NOTE — PROGRESS NOTES
NAME: Bebo Reeder is a 47 y o  female  : 1965    MRN: [id number]      Assessment and Plan   Laceration of right thumb without foreign body without damage to nail, initial encounter [S61 011A]  1  Laceration of right thumb without foreign body without damage to nail, initial encounter  Laceration repair      ordered tetanus vaccine-apparently is contraindicated with history of subarachnoid hemorrhage  Will have her follow up tomorrow with her family doctor, Dr Rosmery Guzmán  Patient Instructions   Patient Instructions   Area clean and dry-avoid submerging for the next several days  Follow-up with PCP tomorrow for tetanus injection  Any signs of infection such as redness, swelling, discharge or fever/chills follow-up with PCP or here    Proceed to ER if symptoms worsen  Chief Complaint     Chief Complaint   Patient presents with    Wound Check     right thumb laceration cut with knife  History of Present Illness   Patient with past medical history of subarachnoid hemorrhage a year and half ago presents complaining of a laceration to her right thumb  Reports she was cutting vegetables with a mandolin when she accidentally caught her right thumb on the bleed  She states that she was not going to come as she did not think it is very deep but reports that would not stop bleeding  She denies any pain currently or any numbness or tingling to the tip of the finger  Her last tetanus shot was in   Review of Systems   Review of Systems   Skin: Positive for wound           Current Medications       Current Outpatient Medications:     acetaminophen (TYLENOL) 500 mg tablet, Take 2 tablets (1,000 mg total) by mouth 3 (three) times a day, Disp: 180 tablet, Rfl: 0    B Complex Vitamins (VITAMIN B COMPLEX PO), Place under the tongue, Disp: , Rfl:     Blood Glucose Monitoring Suppl (BLOOD GLUCOSE MONITOR SYSTEM) w/Device KIT, by Does not apply route 2 (two) times a day, Disp: 50 each, Rfl: 2    Calcium Carbonate-Vit D-Min (CALCIUM 1200 PO), Take 1 capsule by mouth 4 (four) times a day, Disp: , Rfl:     glucose blood test strip, Use as instructed, Disp: 60 each, Rfl: 3    lisinopril (ZESTRIL) 10 mg tablet, Take 10 mg by mouth, Disp: , Rfl:     LORazepam (ATIVAN) 0 5 mg tablet, Take 1 tablet (0 5 mg total) by mouth every 8 (eight) hours as needed for anxiety 1-2 tabs  Tid, prn anxiety  , Disp: 90 tablet, Rfl: 2    Multiple Vitamin-Folic Acid TABS, Take 1 tablet by mouth daily, Disp: , Rfl:     Nutritional Supplements (GLUCOSE MANAGEMENT) TABS, Take 16 g by mouth, Disp: , Rfl:     sucralfate (CARAFATE) 1 g tablet, Take 1 tablet (1 g total) by mouth every 6 (six) hours as needed (pain), Disp: 20 tablet, Rfl: 0    triamterene-hydrochlorothiazide (DYAZIDE) 37 5-25 mg per capsule, Take 1 capsule by mouth daily, Disp: 90 capsule, Rfl: 3    Vortioxetine HBr (TRINTELLIX) 20 MG tablet, Take 1 tablet (20 mg total) by mouth daily, Disp: 30 tablet, Rfl: 3    lisinopril (ZESTRIL) 10 mg tablet, TAKE 1 TABLET BY MOUTH ONCE DAILY (Patient not taking: Reported on 7/9/2019), Disp: 30 tablet, Rfl: 5    verapamil (CALAN) 40 mg tablet, TAKE 1 TABLET BY MOUTH EVERY DAY (Patient not taking: Reported on 7/9/2019), Disp: 30 tablet, Rfl: 3    Current Allergies     Allergies as of 07/09/2019    (No Known Allergies)              Past Medical History:   Diagnosis Date    Anxiety     Hypertension     Reversible cerebrovascular vasoconstriction syndrome     Tremor        Past Surgical History:   Procedure Laterality Date    CHOLECYSTECTOMY      GASTRIC BYPASS  12/08/2000    for morbid obesity, last assessed 4/27/16       Family History   Problem Relation Age of Onset    Hypertension Mother     Diabetes Mother     Hypertension Father     Heart disease Brother     Heart attack Brother     Substance Abuse Brother     Heart failure Brother     Diabetes Brother     Hypertension Brother     Substance Abuse Sister  Substance Abuse Family     Mental illness Neg Hx         neg fam hx         Medications have been verified  The following portions of the patient's history were reviewed and updated as appropriate: allergies, current medications, past family history, past medical history, past social history, past surgical history and problem list     Objective   /90   Pulse 64   Temp 98 4 °F (36 9 °C)   Resp 16   Ht 5' 6" (1 676 m)   Wt 105 kg (232 lb)   LMP 12/21/2016   SpO2 97%   BMI 37 45 kg/m²        Laceration repair  Date/Time: 7/9/2019 6:09 PM  Performed by: Serge Limon PA-C  Authorized by: Serge Limon PA-C   Consent: Verbal consent obtained  Consent given by: patient  Patient understanding: patient states understanding of the procedure being performed  Patient identity confirmed: verbally with patient  Body area: upper extremity  Location details: right thumb  Laceration length: 1 cm  Foreign bodies: no foreign bodies  Tendon involvement: none  Nerve involvement: none    Wound Dehiscence:  Superficial Wound Dehiscence: simple closure      Procedure Details:  Irrigation solution: saline  Irrigation method: jet lavage  Amount of cleaning: standard  Debridement: none  Skin closure: glue  Technique: simple  Approximation: close  Patient tolerance: Patient tolerated the procedure well with no immediate complications        Physical Exam     Physical Exam   Constitutional: She appears well-developed and well-nourished  No distress  Skin: She is not diaphoretic  1 cm long horseshoe shaped superficial skin avulsion to the pad of the right thumb distal to the IP joint  Currently oozing blood  Skin flap was attached and not easily movable  Cut is superficial does not extend all the way into the dermis  No tenderness to palpation  Sensation intact  Capillary refill less than 2 seconds  Vitals reviewed

## 2019-07-10 ENCOUNTER — TELEPHONE (OUTPATIENT)
Dept: FAMILY MEDICINE CLINIC | Facility: HOSPITAL | Age: 54
End: 2019-07-10

## 2019-07-10 NOTE — TELEPHONE ENCOUNTER
I spoke with pt as per Dr Tran Floor Pt dose not need a tetanus shot knife was not braulio, pt is aware DD

## 2019-07-10 NOTE — TELEPHONE ENCOUNTER
Pt called  Yesterday around 4:30pm, she cut her finger on a knife  She went to urgent care  Her last tetanus shot was in 1987  They were going to give her one but some alert came up that it has an indicator for a brain bleed  Patient had a brain bleed in January 2018  They would not give her the tetanus  She was told to check with us  She was told she has a 24 hour window to get the tetanus if she is to have one

## 2019-07-17 NOTE — PROGRESS NOTES
Progress Note - Neurology   Brittany Mcdonald 47 y o  female MRN: [id number]  Unit/Bed#: [facility] 923-01 Encounter: [id number]        Assessment/Plan :  Brittany Mcdonald is a 47 y o  female pmh of HTN, Gastric bypass surgery, tremors, Vitamin B12 and D deficiency who presents to [facility] on 1/22/18 with recurrent thunderclap headaches with underlying intractable headache since 1/15/18, with CTA Head + for small volume SAH concern for RCVS vs Vasclulitis, transferred to \[facility]\"" for formal cerebral angiography         1  Intractable headache with recurrent thunderclap headache: likely 2/2  reversible cerebral vasoconstrictive syndrome (RCVS) due long term SSRI use vs Vasculitis    -Conventional digital subtraction angiogram: report with Few focal areas of smooth narrowing most likely related to a vasculopathy such as reversible cerebral vasoconstriction syndrome     -Laboratory evaluation (serum): Abnormal: CRP: 3 2, WBC: 3 2   Normal: ESR: 13,  BMP,  RPR, Sjogren's negative <0 2, CLOVIS negtive, Lyme antibodies negative; Pending: Beta-2 glycoprotein antibodies, and anti-neutrophilic cytoplasmic antibody  -MRA brain head and neck   -continue IV fluids  -continue pain management with dilaudid, norco, and tylenol and avoid vasoactive substances such as caffeine; dc'd Fiorocet  -continue Magnesium Sulfate IV 1g BID  -SBP goal 120-160  -continue Verapamil; appreciate Internal medicine input for titration   -wean off Celexa; f/u with PCP outpatient for non-SSRI, SNRI medication for depression      2  SAH: small volume demonstrated on CT Head  Likely 2/2 complication from reversible cerbral vasoconstrictive syndrome  -appreciate Neurosurgery recommendations    3  H/o gastric bypass: at risk for nutritional deficiencies    -consider restarting vitamin D, Vitamin B12, multivitamin, and copper  -consider nutritional consult for dietary education    Subjective:   Yesterday, Patient reports increased headache pain after the angiography and was very uncomfortable lying flat  She found relief after receiving dilaudid x 2, and when she was able to sit up describing her headache pain intensity to as a "2-3" with some photosensitivity  Later that night, she reports a bout of nausea, which she attributes to" not eating all day, and then eating spaghetti"  Over night, patient says she was awoken but tele alarm for heart rate 36  She states she felt dizzy and lightheaded at that time  Currently, she  continues with a mild occipital region headache that varies in intensity, but no with no associated photosensitivity, visual changes, numbness, tingling, dizziness  She states her pain is tolerable with her current pain medications  Review of Systems - Negative except as noted in subjective      Vitals: Blood pressure 146/80, pulse (!) 47, temperature 98 6 °F (37 °C), temperature source Oral, resp  rate 18, height 5' 6" (1 676 m), weight 110 kg (242 lb 8 1 oz), last menstrual period 12/21/2016, SpO2 97 %, not currently breastfeeding  ,Body mass index is 39 14 kg/m²  MEDS:    [START ON 1/27/2018] citalopram 10 mg Oral Daily   citalopram 20 mg Oral Daily With Breakfast   lisinopril 10 mg Oral Daily   polyethylene glycol 17 g Oral Daily   senna-docusate sodium 2 tablet Oral Daily   verapamil 40 mg Oral Q8H [address]   :    Vital signs over 24 hours:  HR:  [47-66] 47  Resp:  [16-19] 18  BP: (129-175)/(66-90) 146/80  SpO2:  [96 %-98 %] 97 %     Physical Exam   Constitutional: She is oriented to person, place, and time  She appears well-developed and well-nourished  No distress  HENT:   Head: Normocephalic and atraumatic  Mouth/Throat: Oropharynx is clear and moist  No oropharyngeal exudate  Eyes: EOM are normal  Pupils are equal, round, and reactive to light  Neck: Normal range of motion  Neck supple  Cardiovascular: Normal rate and normal heart sounds  Pulmonary/Chest: Effort normal and breath sounds normal  No respiratory distress  Abdominal: Soft  Bowel sounds are normal  She exhibits no distension  Musculoskeletal: She exhibits no edema  Neurological: She is alert and oriented to person, place, and time  She has a normal Finger-Nose-Finger Test    Reflex Scores:       Bicep reflexes are 3+ on the right side and 3+ on the left side  Brachioradialis reflexes are 3+ on the right side and 3+ on the left side  Patellar reflexes are 3+ on the right side and 3+ on the left side  Achilles reflexes are 3+ on the right side and 3+ on the left side  Skin: Skin is warm and dry  She is not diaphoretic  Psychiatric: She has a normal mood and affect  Her speech is normal        Neurologic Exam     Mental Status   Oriented to person, place, and time  ([address])  Attention: normal  Concentration: normal    Speech: speech is normal   Knowledge: good  Recalls 3 objects from 2 days ago: apple, chair, abdifatah     Cranial Nerves     CN II   Visual fields full to confrontation  CN III, IV, VI   Pupils are equal, round, and reactive to light  Extraocular motions are normal    Right pupil: Size: 2 mm  Shape: regular  Reactivity: brisk  Left pupil: Size: 2 mm  Shape: regular  Reactivity: brisk  CN III: no CN III palsy  CN VI: no CN VI palsy  Nystagmus: none   Diplopia: none    CN V   Facial sensation intact  CN VII   Facial expression full, symmetric       CN VIII   Hearing: intact    CN IX, X   Palate: symmetric    CN XI   CN XI normal      CN XII   CN XII normal      Motor Exam   Muscle bulk: normal  Overall muscle tone: normal    Strength   Right neck flexion: 5/5  Left neck flexion: 5/5  Right neck extension: 5/5  Left neck extension: 5/5  Right deltoid: 5/5  Left deltoid: 5/5  Right biceps: 5/5  Left biceps: 5/5  Right triceps: 5/5  Left triceps: 5/5  Right wrist flexion: 5/5  Left wrist flexion: 5/5  Right wrist extension: 5/5  Left wrist extension: 5/5  Right interossei: 5/5  Left interossei: 5/5  Right abdominals: 5/5  Left abdominals: 5/5  Right iliopsoas: 5/5  Left iliopsoas: 5/5  Right quadriceps: 5/5  Left quadriceps: 5/5  Right hamstrin/5  Left hamstrin/5  Right glutei: 5/5  Left glutei: 5/5  Right anterior tibial: 5/5  Left anterior tibial: 5/5  Right posterior tibial: 5/5  Left posterior tibial: 5/5  Right peroneal: 5/5  Left peroneal: 5/5  Right gastroc: 5/5  Left gastroc: 5/5    Sensory Exam   Light touch normal    Vibration normal    Proprioception normal      Gait, Coordination, and Reflexes     Coordination   Finger to nose coordination: normal    Tremor   Intention tremor: present    Reflexes   Right brachioradialis: 3+  Left brachioradialis: 3+  Right biceps: 3+  Left biceps: 3+  Right patellar: 3+  Left patellar: 3+  Right achilles: 3+  Left achilles: 3+  Right plantar: normal  Left plantar: normal  Right ankle clonus: absent  Left ankle clonus: absent      Lab Results:   Recent Results (from the past 24 hour(s))   CBC    Collection Time: 18  5:17 AM   Result Value Ref Range    WBC 4 69 4 31 - 10 16 Thousand/uL    RBC 4 59 3 81 - 5 12 Million/uL    Hemoglobin 14 7 11 5 - 15 4 g/dL    Hematocrit 43 9 34 8 - 46 1 %    MCV 96 82 - 98 fL    MCH 32 0 26 8 - 34 3 pg    MCHC 33 5 31 4 - 37 4 g/dL    RDW 13 4 11 6 - 15 1 %    Platelets 564 452 - 454 Thousands/uL    MPV 10 4 8 9 - 12 7 fL   Basic metabolic panel    Collection Time: 18  5:17 AM   Result Value Ref Range    Sodium 139 136 - 145 mmol/L    Potassium 3 5 3 5 - 5 3 mmol/L    Chloride 103 100 - 108 mmol/L    CO2 29 21 - 32 mmol/L    Anion Gap 7 4 - 13 mmol/L    BUN 7 5 - 25 mg/dL    Creatinine 0 60 0 60 - 1 30 mg/dL    Glucose 93 65 - 140 mg/dL    Calcium 9 1 8 3 - 10 1 mg/dL    eGFR 106 ml/min/1 73sq m         Imaging Studies: I have personally reviewed pertinent reports     and I have personally reviewed pertinent films in PACS     18 IR Cerebral angiography: Few focal areas of smooth narrowing most likely related to a vasculopathy such as reversible cerebral vasoconstriction syndrome  Primary CNS vasculitis felt unlikely as well as atherosclerosis however are not entirely excluded    EEG, Pathology, and Other Studies: I have personally reviewed pertinent reports  and I have personally reviewed pertinent films in PACS    VTE Prophylaxis: Sequential compression device (Venodyne)      Counseling / Coordination of Care  Total time spent today 50 minutes  Greater than 50% of total time was spent with the patient and / or family counseling and / or coordination of care   A description of the counseling / coordination of care: discussion of plan of care, diagnostics with patient, spouse and attending Complex Repair And Double M Plasty Text: The defect edges were debeveled with a #15 scalpel blade.  The primary defect was closed partially with a complex linear closure.  Given the location of the remaining defect, shape of the defect and the proximity to free margins a double M plasty was deemed most appropriate for complete closure of the defect.  Using a sterile surgical marker, an appropriate advancement flap was drawn incorporating the defect and placing the expected incisions within the relaxed skin tension lines where possible.    The area thus outlined was incised deep to adipose tissue with a #15 scalpel blade.  The skin margins were undermined to an appropriate distance in all directions utilizing iris scissors.

## 2019-08-06 LAB
25(OH)D3+25(OH)D2 SERPL-MCNC: 33.7 NG/ML (ref 30–100)
ALBUMIN SERPL-MCNC: 4.3 G/DL (ref 3.5–5.5)
ALBUMIN/GLOB SERPL: 2 {RATIO} (ref 1.2–2.2)
ALP SERPL-CCNC: 47 IU/L (ref 39–117)
ALT SERPL-CCNC: 16 IU/L (ref 0–32)
AST SERPL-CCNC: 21 IU/L (ref 0–40)
BASOPHILS # BLD AUTO: 0 X10E3/UL (ref 0–0.2)
BASOPHILS NFR BLD AUTO: 0 %
BILIRUB SERPL-MCNC: 0.5 MG/DL (ref 0–1.2)
BUN SERPL-MCNC: 17 MG/DL (ref 6–24)
BUN/CREAT SERPL: 23 (ref 9–23)
CALCIUM SERPL-MCNC: 9.4 MG/DL (ref 8.7–10.2)
CHLORIDE SERPL-SCNC: 103 MMOL/L (ref 96–106)
CO2 SERPL-SCNC: 28 MMOL/L (ref 20–29)
CREAT SERPL-MCNC: 0.73 MG/DL (ref 0.57–1)
EOSINOPHIL # BLD AUTO: 0 X10E3/UL (ref 0–0.4)
EOSINOPHIL NFR BLD AUTO: 0 %
ERYTHROCYTE [DISTWIDTH] IN BLOOD BY AUTOMATED COUNT: 13.5 % (ref 12.3–15.4)
GLOBULIN SER-MCNC: 2.1 G/DL (ref 1.5–4.5)
GLUCOSE SERPL-MCNC: 84 MG/DL (ref 65–99)
HBA1C MFR BLD: 5.2 % (ref 4.8–5.6)
HCT VFR BLD AUTO: 40.1 % (ref 34–46.6)
HGB BLD-MCNC: 13.8 G/DL (ref 11.1–15.9)
IMM GRANULOCYTES # BLD: 0 X10E3/UL (ref 0–0.1)
IMM GRANULOCYTES NFR BLD: 0 %
INSULIN SERPL-ACNC: 5 UIU/ML (ref 2.6–24.9)
IRON SERPL-MCNC: 108 UG/DL (ref 27–159)
LYMPHOCYTES # BLD AUTO: 1.2 X10E3/UL (ref 0.7–3.1)
LYMPHOCYTES NFR BLD AUTO: 41 %
MAGNESIUM SERPL-MCNC: 1.9 MG/DL (ref 1.6–2.3)
MCH RBC QN AUTO: 29.9 PG (ref 26.6–33)
MCHC RBC AUTO-ENTMCNC: 34.4 G/DL (ref 31.5–35.7)
MCV RBC AUTO: 87 FL (ref 79–97)
MONOCYTES # BLD AUTO: 0.2 X10E3/UL (ref 0.1–0.9)
MONOCYTES NFR BLD AUTO: 7 %
NEUTROPHILS # BLD AUTO: 1.5 X10E3/UL (ref 1.4–7)
NEUTROPHILS NFR BLD AUTO: 52 %
PLATELET # BLD AUTO: 161 X10E3/UL (ref 150–450)
POTASSIUM SERPL-SCNC: 4 MMOL/L (ref 3.5–5.2)
PROT SERPL-MCNC: 6.4 G/DL (ref 6–8.5)
RBC # BLD AUTO: 4.61 X10E6/UL (ref 3.77–5.28)
SL AMB EGFR AFRICAN AMERICAN: 110 ML/MIN/1.73
SL AMB EGFR NON AFRICAN AMERICAN: 95 ML/MIN/1.73
SODIUM SERPL-SCNC: 142 MMOL/L (ref 134–144)
T4 FREE SERPL-MCNC: 1.16 NG/DL (ref 0.82–1.77)
TSH SERPL DL<=0.005 MIU/L-ACNC: 1.12 UIU/ML (ref 0.45–4.5)
VIT B12 SERPL-MCNC: <150 PG/ML (ref 232–1245)
WBC # BLD AUTO: 3 X10E3/UL (ref 3.4–10.8)

## 2019-08-08 ENCOUNTER — CONSULT (OUTPATIENT)
Dept: ENDOCRINOLOGY | Facility: HOSPITAL | Age: 54
End: 2019-08-08
Payer: COMMERCIAL

## 2019-08-08 VITALS
HEIGHT: 66 IN | BODY MASS INDEX: 35.03 KG/M2 | HEART RATE: 60 BPM | SYSTOLIC BLOOD PRESSURE: 118 MMHG | DIASTOLIC BLOOD PRESSURE: 78 MMHG | WEIGHT: 218 LBS

## 2019-08-08 DIAGNOSIS — Z98.84 HISTORY OF GASTRIC BYPASS: Chronic | ICD-10-CM

## 2019-08-08 DIAGNOSIS — E16.1 REACTIVE HYPOGLYCEMIA: ICD-10-CM

## 2019-08-08 DIAGNOSIS — E66.09 OBESITY DUE TO EXCESS CALORIES WITHOUT SERIOUS COMORBIDITY, UNSPECIFIED CLASSIFICATION: Chronic | ICD-10-CM

## 2019-08-08 DIAGNOSIS — G25.0 TREMOR, ESSENTIAL: ICD-10-CM

## 2019-08-08 DIAGNOSIS — E16.2 HYPOGLYCEMIA: Primary | ICD-10-CM

## 2019-08-08 PROCEDURE — 99204 OFFICE O/P NEW MOD 45 MIN: CPT | Performed by: INTERNAL MEDICINE

## 2019-08-08 NOTE — PROGRESS NOTES
8/8/2019    Assessment/Plan      Diagnoses and all orders for this visit:    Hypoglycemia  -     Ambulatory referral to medical nutrition therapy for diabetes; Future  -     Cortisol Level, AM Specimen- Lab Collect; Future  -     Comprehensive metabolic panel Lab Collect; Future  -     ACTH- Lab Collect; Future    History of gastric bypass  -     Ambulatory referral to Endocrinology    Tremor, essential  -     Ambulatory referral to Endocrinology    Obesity due to excess calories without serious comorbidity, unspecified classification  -     Ambulatory referral to Endocrinology    Reactive hypoglycemia  -     Ambulatory referral to Endocrinology        Assessment/Plan:  Hypoglycemia:  Suspect due to post gastric bypass hypoglycemia  Discussed with the patient that the main treatment is related to dietary intervention  I provided information regarding dietary interventions for post gastric bypass hypoglycemia  We discussed adding protein to treatment for a low blood sugar in addition to carb treatment  She will meet with a dietitian to come up with a meal plan in this regard  We also discussed that if dietary intervention do not completely resolve hypoglycemia, there are medications that we can use to treat such as acarbose, diazoxide, octreotide  I also discussed that if dietary and medication interventions do not resolve hypoglycemia, we may need to do further testing such as either a mixed meal test or fasting test   We will have her come back to the office in 3 months with labs as ordered above just prior  CC: Hypoglycemia    History of Present Illness     HPI: Lety Mcfarland is a 47y o  year old female with history of gastric bypass surgery [id number] about 18-20 years ago, hypertension, prediabetes before gastric bypass surgery presents for evaluation of hypoglycemia  This started around December when she was sent to the emergency department after syncope suspected to be due to hypoglycemia  Since that time she has not had any syncope but she has had hypoglycemia as low as 21 and sometimes in the 30s  This typically occurs after meals  She has not had any fasting hypoglycemia  No change in weight recently  No corticosteroid exposure  She does believe about 15 years ago she did have a similar episode which would have been about 2-3 years after gastric bypass surgery  She denies any nausea, vomiting, abdominal discomfort  She does have a meter and testing supplies at home  Review of Systems   Constitutional: Negative for fatigue  HENT: Negative for trouble swallowing and voice change  Eyes: Negative for visual disturbance  Respiratory: Negative for shortness of breath  Cardiovascular: Negative for palpitations and leg swelling  Gastrointestinal: Negative for abdominal pain, nausea and vomiting  Endocrine: Negative for polydipsia and polyuria  Musculoskeletal: Negative for arthralgias and myalgias  Skin: Negative for rash  Neurological: Positive for syncope  Negative for dizziness, tremors and weakness  Hematological: Negative for adenopathy  Psychiatric/Behavioral: Negative for agitation and confusion         Historical Information   Past Medical History:   Diagnosis Date    Anxiety     Hypertension     Reversible cerebrovascular vasoconstriction syndrome     Tremor      Past Surgical History:   Procedure Laterality Date    CHOLECYSTECTOMY      GASTRIC BYPASS  12/08/2000    for morbid obesity, last assessed 4/27/16     Social History   Social History     Substance and Sexual Activity   Alcohol Use Yes    Frequency: 2-3 times a week    Drinks per session: 1 or 2    Binge frequency: Never    Comment: Social drinker per [employer]     Social History     Substance and Sexual Activity   Drug Use No     Social History     Tobacco Use   Smoking Status Former Smoker    Types: Cigarettes   Smokeless Tobacco Never Used   Tobacco Comment    social smoker, quit 2017 / never a smoker denied      Family History:   Family History   Problem Relation Age of Onset    Hypertension Mother     Diabetes Mother     Hypertension Father     Heart disease Brother     Heart attack Brother     Substance Abuse Brother     Heart failure Brother     Diabetes Brother     Hypertension Brother     Substance Abuse Sister     Substance Abuse Family     Mental illness Neg Hx         neg fam hx       Meds/Allergies   Current Outpatient Medications   Medication Sig Dispense Refill    acetaminophen (TYLENOL) 500 mg tablet Take 2 tablets (1,000 mg total) by mouth 3 (three) times a day 180 tablet 0    B Complex Vitamins (VITAMIN B COMPLEX PO) Place under the tongue      Blood Glucose Monitoring Suppl (BLOOD GLUCOSE MONITOR SYSTEM) w/Device KIT by Does not apply route 2 (two) times a day 50 each 2    Calcium Carbonate-Vit D-Min (CALCIUM 1200 PO) Take 1 capsule by mouth 4 (four) times a day      glucose blood test strip Use as instructed 60 each 3    lisinopril (ZESTRIL) 10 mg tablet TAKE 1 TABLET BY MOUTH ONCE DAILY 30 tablet 5    LORazepam (ATIVAN) 0 5 mg tablet Take 1 tablet (0 5 mg total) by mouth every 8 (eight) hours as needed for anxiety 1-2 tabs  Tid, prn anxiety  90 tablet 2    Multiple Vitamin-Folic Acid TABS Take 1 tablet by mouth daily      Nutritional Supplements (GLUCOSE MANAGEMENT) TABS Take 16 g by mouth      sucralfate (CARAFATE) 1 g tablet Take 1 tablet (1 g total) by mouth every 6 (six) hours as needed (pain) 20 tablet 0    triamterene-hydrochlorothiazide (DYAZIDE) 37 5-25 mg per capsule Take 1 capsule by mouth daily 90 capsule 3    Vortioxetine HBr (TRINTELLIX) 20 MG tablet Take 1 tablet (20 mg total) by mouth daily (Patient taking differently: Take 10 mg by mouth daily ) 30 tablet 3     No current facility-administered medications for this visit        No Known Allergies    Objective   Vitals: Blood pressure 118/78, pulse 60, height 5' 6" (1 676 m), weight 98 9 kg (218 lb), last menstrual period 12/21/2016, not currently breastfeeding  Invasive Devices     None                 Physical Exam   Constitutional: She is oriented to person, place, and time  She appears well-developed and well-nourished  No distress  HENT:   Head: Normocephalic and atraumatic  Neck: Normal range of motion  Neck supple  No thyromegaly present  Cardiovascular: Normal rate and regular rhythm  Pulmonary/Chest: Effort normal and breath sounds normal  No respiratory distress  Abdominal: Soft  Bowel sounds are normal    Musculoskeletal: Normal range of motion  She exhibits no edema  Neurological: She is alert and oriented to person, place, and time  She exhibits normal muscle tone  Skin: Skin is warm and dry  No rash noted  She is not diaphoretic  Psychiatric: She has a normal mood and affect  Her behavior is normal    Vitals reviewed  The history was obtained from the review of the chart and from the patient and family      Lab Results:      Recent Results (from the past 33879 hour(s))   Hemoglobin A1C    Collection Time: 10/05/18 12:00 AM   Result Value Ref Range    Hemoglobin A1C 4 9    CBC and differential    Collection Time: 10/05/18  9:06 AM   Result Value Ref Range    White Blood Cell Count 3 8 3 4 - 10 8 x10E3/uL    Red Blood Cell Count 4 16 3 77 - 5 28 x10E6/uL    Hemoglobin 13 4 11 1 - 15 9 g/dL    HCT 39 7 34 0 - 46 6 %    MCV 95 79 - 97 fL    MCH 32 2 26 6 - 33 0 pg    MCHC 33 8 31 5 - 35 7 g/dL    RDW 14 3 12 3 - 15 4 %    Platelet Count 557 (L) 150 - 379 x10E3/uL    Neutrophils 63 Not Estab  %    Lymphocytes 27 Not Estab  %    Monocytes 9 Not Estab  %    Eosinophils 1 Not Estab  %    Basophils PCT 0 Not Estab  %    Neutrophils (Absolute) 2 4 1 4 - 7 0 x10E3/uL    Lymphocytes (Absolute) 1 0 0 7 - 3 1 x10E3/uL    Monocytes (Absolute) 0 4 0 1 - 0 9 x10E3/uL    Eosinophils (Absolute) 0 0 0 0 - 0 4 x10E3/uL    Basophils ABS 0 0 0 0 - 0 2 x10E3/uL    Immature Granulocytes 0 Not Estab  % Immature Granulocytes (Absolute) 0 0 0 0 - 0 1 x10E3/uL   Comprehensive metabolic panel    Collection Time: 10/05/18  9:06 AM   Result Value Ref Range    Glucose, Random 85 65 - 99 mg/dL    BUN 13 6 - 24 mg/dL    Creatinine 0 91 0 57 - 1 00 mg/dL    eGFR Non  73 >59 mL/min/1 73    eGFR  84 >59 mL/min/1 73    SL AMB BUN/CREATININE RATIO 14 9 - 23    Sodium 140 134 - 144 mmol/L    Potassium 4 1 3 5 - 5 2 mmol/L    Chloride 98 96 - 106 mmol/L    CO2 26 20 - 29 mmol/L    CALCIUM 9 5 8 7 - 10 2 mg/dL    Protein, Total 6 5 6 0 - 8 5 g/dL    Albumin 4 4 3 5 - 5 5 g/dL    Globulin, Total 2 1 1 5 - 4 5 g/dL    Albumin/Globulin Ratio 2 1 1 2 - 2 2    TOTAL BILIRUBIN 0 6 0 0 - 1 2 mg/dL    Alk Phos Isoenzymes 47 39 - 117 IU/L    AST 17 0 - 40 IU/L    ALT 12 0 - 32 IU/L   Lipid panel    Collection Time: 10/05/18  9:06 AM   Result Value Ref Range    Cholesterol, Total 163 100 - 199 mg/dL    Triglycerides 69 0 - 149 mg/dL    HDL 62 >39 mg/dL    VLDL Cholesterol Calculated 14 5 - 40 mg/dL    LDL Direct 87 0 - 99 mg/dL   TIBC    Collection Time: 10/05/18  9:06 AM   Result Value Ref Range    Total Iron Binding Capacity (TIBC) 326 250 - 450 ug/dL    UIBC 229 131 - 425 ug/dL    Iron, Serum 97 27 - 159 ug/dL    Iron Saturation 30 15 - 55 %   Hemoglobin A1C    Collection Time: 10/05/18  9:06 AM   Result Value Ref Range    Hemoglobin A1C 4 9 4 8 - 5 6 %    Estimated Average Glucose 94 mg/dL   Vitamin D 25 hydroxy    Collection Time: 10/05/18  9:06 AM   Result Value Ref Range    25-HYDROXY VIT D 19 4 (L) 30 0 - 100 0 ng/mL   TSH, 3rd generation with Free T4 reflex    Collection Time: 10/05/18  9:06 AM   Result Value Ref Range    TSH 1 590 0 450 - 4 500 uIU/mL   Vitamin B12    Collection Time: 10/05/18  9:06 AM   Result Value Ref Range    Vitamin B-12 <150 (L) 232 - 1,245 pg/mL   Ferritin    Collection Time: 10/05/18  9:06 AM   Result Value Ref Range    Ferritin 58 15 - 150 ng/mL   Specimen Status Report Collection Time: 10/05/18  9:06 AM   Result Value Ref Range    Comment Comment    ECG 12 lead    Collection Time: 12/23/18  3:35 PM   Result Value Ref Range    Ventricular Rate 66 BPM    Atrial Rate 66 BPM    SD Interval 158 ms    QRSD Interval 82 ms    QT Interval 396 ms    QTC Interval 415 ms    P Terry 44 degrees    QRS Axis 37 degrees    T Wave Axis 46 degrees   Comprehensive metabolic panel    Collection Time: 12/23/18  3:38 PM   Result Value Ref Range    Sodium 140 136 - 145 mmol/L    Potassium 4 1 3 5 - 5 3 mmol/L    Chloride 99 (L) 100 - 108 mmol/L    CO2 29 21 - 32 mmol/L    ANION GAP 12 4 - 13 mmol/L    BUN 17 5 - 25 mg/dL    Creatinine 0 79 0 60 - 1 30 mg/dL    Glucose 117 65 - 140 mg/dL    Calcium 8 6 8 3 - 10 1 mg/dL     (H) 5 - 45 U/L     (H) 12 - 78 U/L    Alkaline Phosphatase 52 46 - 116 U/L    Total Protein 7 0 6 4 - 8 2 g/dL    Albumin 3 6 3 5 - 5 0 g/dL    Total Bilirubin 0 50 0 20 - 1 00 mg/dL    eGFR 86 ml/min/1 73sq m   CBC and differential    Collection Time: 12/23/18  3:38 PM   Result Value Ref Range    WBC 4 14 (L) 4 31 - 10 16 Thousand/uL    RBC 4 39 3 81 - 5 12 Million/uL    Hemoglobin 13 6 11 5 - 15 4 g/dL    Hematocrit 41 8 34 8 - 46 1 %    MCV 95 82 - 98 fL    MCH 31 0 26 8 - 34 3 pg    MCHC 32 5 31 4 - 37 4 g/dL    RDW 11 3 (L) 11 6 - 15 1 %    MPV 9 8 8 9 - 12 7 fL    Platelets 595 816 - 036 Thousands/uL    nRBC 0 /100 WBCs    Neutrophils Relative 62 43 - 75 %    Immat GRANS % 0 0 - 2 %    Lymphocytes Relative 31 14 - 44 %    Monocytes Relative 7 4 - 12 %    Eosinophils Relative 0 0 - 6 %    Basophils Relative 0 0 - 1 %    Neutrophils Absolute 2 56 1 85 - 7 62 Thousands/µL    Immature Grans Absolute 0 01 0 00 - 0 20 Thousand/uL    Lymphocytes Absolute 1 27 0 60 - 4 47 Thousands/µL    Monocytes Absolute 0 28 0 17 - 1 22 Thousand/µL    Eosinophils Absolute 0 01 0 00 - 0 61 Thousand/µL    Basophils Absolute 0 01 0 00 - 0 10 Thousands/µL   Lipase    Collection Time: 12/23/18  3:38 PM   Result Value Ref Range    Lipase 148 73 - 393 u/L   Troponin I    Collection Time: 12/23/18  3:38 PM   Result Value Ref Range    Troponin I <0 02 <=0 04 ng/mL   CBC and differential    Collection Time: 08/05/19  9:25 AM   Result Value Ref Range    White Blood Cell Count 3 0 (L) 3 4 - 10 8 x10E3/uL    Red Blood Cell Count 4 61 3 77 - 5 28 x10E6/uL    Hemoglobin 13 8 11 1 - 15 9 g/dL    HCT 40 1 34 0 - 46 6 %    MCV 87 79 - 97 fL    MCH 29 9 26 6 - 33 0 pg    MCHC 34 4 31 5 - 35 7 g/dL    RDW 13 5 12 3 - 15 4 %    Platelet Count 336 441 - 450 x10E3/uL    Neutrophils 52 Not Estab  %    Lymphocytes 41 Not Estab  %    Monocytes 7 Not Estab  %    Eosinophils 0 Not Estab  %    Basophils PCT 0 Not Estab  %    Neutrophils (Absolute) 1 5 1 4 - 7 0 x10E3/uL    Lymphocytes (Absolute) 1 2 0 7 - 3 1 x10E3/uL    Monocytes (Absolute) 0 2 0 1 - 0 9 x10E3/uL    Eosinophils (Absolute) 0 0 0 0 - 0 4 x10E3/uL    Basophils ABS 0 0 0 0 - 0 2 x10E3/uL    Immature Granulocytes 0 Not Estab  %    Immature Granulocytes (Absolute) 0 0 0 0 - 0 1 x10E3/uL   Comprehensive metabolic panel    Collection Time: 08/05/19  9:25 AM   Result Value Ref Range    Glucose, Random 84 65 - 99 mg/dL    BUN 17 6 - 24 mg/dL    Creatinine 0 73 0 57 - 1 00 mg/dL    eGFR Non African American 95 >59 mL/min/1 73    eGFR  110 >59 mL/min/1 73    SL AMB BUN/CREATININE RATIO 23 9 - 23    Sodium 142 134 - 144 mmol/L    Potassium 4 0 3 5 - 5 2 mmol/L    Chloride 103 96 - 106 mmol/L    CO2 28 20 - 29 mmol/L    CALCIUM 9 4 8 7 - 10 2 mg/dL    Protein, Total 6 4 6 0 - 8 5 g/dL    Albumin 4 3 3 5 - 5 5 g/dL    Globulin, Total 2 1 1 5 - 4 5 g/dL    Albumin/Globulin Ratio 2 0 1 2 - 2 2    TOTAL BILIRUBIN 0 5 0 0 - 1 2 mg/dL    Alk Phos Isoenzymes 47 39 - 117 IU/L    AST 21 0 - 40 IU/L    ALT 16 0 - 32 IU/L   Hemoglobin A1c (w/out EAG)    Collection Time: 08/05/19  9:25 AM   Result Value Ref Range    Hemoglobin A1C 5 2 4 8 - 5 6 %   T4, free Collection Time: 08/05/19  9:25 AM   Result Value Ref Range    Free t4 1 16 0 82 - 1 77 ng/dL   TSH, 3rd generation    Collection Time: 08/05/19  9:25 AM   Result Value Ref Range    TSH 1 120 0 450 - 4 500 uIU/mL   Vitamin D 25 hydroxy    Collection Time: 08/05/19  9:25 AM   Result Value Ref Range    25-HYDROXY VIT D 33 7 30 0 - 100 0 ng/mL   Iron    Collection Time: 08/05/19  9:25 AM   Result Value Ref Range    Iron, Serum 108 27 - 159 ug/dL   Vitamin B12    Collection Time: 08/05/19  9:25 AM   Result Value Ref Range    Vitamin B-12 <150 (L) 232 - 1,245 pg/mL   Magnesium    Collection Time: 08/05/19  9:25 AM   Result Value Ref Range    Magnesium, Serum 1 9 1 6 - 2 3 mg/dL   Insulin, 1 Hour    Collection Time: 08/05/19  9:25 AM   Result Value Ref Range    Insulin, 1/2 hour 5 0 2 6 - 24 9 uIU/mL         Future Appointments   Date Time Provider Jesse Swan   8/16/2019  9:15 AM Tashia Oconnor PA-C [facility]       Portions of the record may have been created with voice recognition software  Occasional wrong word or "sound a like" substitutions may have occurred due to the inherent limitations of voice recognition software  Read the chart carefully and recognize, using context, where substitutions have occurred

## 2019-08-14 ENCOUNTER — OFFICE VISIT (OUTPATIENT)
Dept: FAMILY MEDICINE CLINIC | Facility: HOSPITAL | Age: 54
End: 2019-08-14
Payer: COMMERCIAL

## 2019-08-14 VITALS
HEART RATE: 68 BPM | DIASTOLIC BLOOD PRESSURE: 78 MMHG | WEIGHT: 220 LBS | SYSTOLIC BLOOD PRESSURE: 122 MMHG | RESPIRATION RATE: 16 BRPM | HEIGHT: 66 IN | BODY MASS INDEX: 35.36 KG/M2 | TEMPERATURE: 98.8 F

## 2019-08-14 DIAGNOSIS — J06.9 VIRAL UPPER RESPIRATORY TRACT INFECTION: Primary | ICD-10-CM

## 2019-08-14 PROCEDURE — 3008F BODY MASS INDEX DOCD: CPT | Performed by: INTERNAL MEDICINE

## 2019-08-14 PROCEDURE — 99213 OFFICE O/P EST LOW 20 MIN: CPT | Performed by: INTERNAL MEDICINE

## 2019-08-14 RX ORDER — AZITHROMYCIN 500 MG/1
500 TABLET, FILM COATED ORAL EVERY 24 HOURS
Qty: 5 TABLET | Refills: 0 | Status: SHIPPED | OUTPATIENT
Start: 2019-08-14 | End: 2019-08-19

## 2019-08-14 RX ORDER — PROMETHAZINE HYDROCHLORIDE AND CODEINE PHOSPHATE 6.25; 1 MG/5ML; MG/5ML
5 SYRUP ORAL EVERY 4 HOURS PRN
Qty: 120 ML | Refills: 0 | Status: SHIPPED | OUTPATIENT
Start: 2019-08-14 | End: 2019-08-27 | Stop reason: SDUPTHER

## 2019-08-14 NOTE — PROGRESS NOTES
Subjective:   No chief complaint on file  Patient ID: Liane Neely is a 47 y o  female  Presents c/o symptoms of upper respiratory inflammation  Nasal and sinus pressure, some PND,  Some ear pressure,  Occ  Scratchy throat  +/- fever  Sx for few days, not improving with OTC treatments  The following portions of the patient's history were reviewed and updated as appropriate: allergies, current medications, past family history, past medical history, past social history, past surgical history and problem list     Review of Systems   Constitutional: Positive for fatigue  Negative for fever  HENT: Positive for congestion, ear pain, postnasal drip and sinus pressure  Eyes: Negative for discharge  Respiratory: Positive for cough  Negative for shortness of breath and wheezing  Cardiovascular: Negative for chest pain  Neurological: Positive for headaches  Current Outpatient Medications on File Prior to Visit   Medication Sig Dispense Refill    acetaminophen (TYLENOL) 500 mg tablet Take 2 tablets (1,000 mg total) by mouth 3 (three) times a day 180 tablet 0    B Complex Vitamins (VITAMIN B COMPLEX PO) Place under the tongue      Blood Glucose Monitoring Suppl (BLOOD GLUCOSE MONITOR SYSTEM) w/Device KIT by Does not apply route 2 (two) times a day 50 each 2    Calcium Carbonate-Vit D-Min (CALCIUM 1200 PO) Take 1 capsule by mouth 4 (four) times a day      glucose blood test strip Use as instructed 60 each 3    lisinopril (ZESTRIL) 10 mg tablet TAKE 1 TABLET BY MOUTH ONCE DAILY 30 tablet 5    LORazepam (ATIVAN) 0 5 mg tablet Take 1 tablet (0 5 mg total) by mouth every 8 (eight) hours as needed for anxiety 1-2 tabs  Tid, prn anxiety   90 tablet 2    Multiple Vitamin-Folic Acid TABS Take 1 tablet by mouth daily      Nutritional Supplements (GLUCOSE MANAGEMENT) TABS Take 16 g by mouth      sucralfate (CARAFATE) 1 g tablet Take 1 tablet (1 g total) by mouth every 6 (six) hours as needed (pain) 20 tablet 0    triamterene-hydrochlorothiazide (DYAZIDE) 37 5-25 mg per capsule Take 1 capsule by mouth daily 90 capsule 3    Vortioxetine HBr (TRINTELLIX) 20 MG tablet Take 1 tablet (20 mg total) by mouth daily (Patient taking differently: Take 10 mg by mouth daily ) 30 tablet 3     No current facility-administered medications on file prior to visit  Objective: There were no vitals filed for this visit  Physical Exam   Constitutional: She is oriented to person, place, and time  She appears well-developed and well-nourished  She appears distressed  HENT:   Right Ear: Tympanic membrane is injected and retracted  Decreased hearing is noted  Left Ear: Tympanic membrane is injected and retracted  Decreased hearing is noted  Nose: Mucosal edema and rhinorrhea present  Mouth/Throat: Mucous membranes are normal  Posterior oropharyngeal erythema present  No tonsillar abscesses  Eyes: Pupils are equal, round, and reactive to light  Neck: Normal range of motion  Neck supple  No thyromegaly present  Cardiovascular: Normal rate, regular rhythm, normal heart sounds and intact distal pulses  No murmur heard  Pulmonary/Chest: Effort normal and breath sounds normal  She has no wheezes  She has no rales  Abdominal: Soft  Bowel sounds are normal  There is no tenderness  Musculoskeletal: Normal range of motion  She exhibits no edema, tenderness or deformity  Lymphadenopathy:     She has no cervical adenopathy  Neurological: She is alert and oriented to person, place, and time  She has normal reflexes  Skin: Skin is warm and dry  Psychiatric: She has a normal mood and affect  Assessment/Plan:    No problem-specific Assessment & Plan notes found for this encounter         Diagnoses and all orders for this visit:    Viral upper respiratory tract infection

## 2019-08-16 ENCOUNTER — OFFICE VISIT (OUTPATIENT)
Dept: FAMILY MEDICINE CLINIC | Facility: HOSPITAL | Age: 54
End: 2019-08-16
Payer: COMMERCIAL

## 2019-08-16 VITALS
SYSTOLIC BLOOD PRESSURE: 104 MMHG | HEART RATE: 66 BPM | TEMPERATURE: 98.3 F | WEIGHT: 221.2 LBS | BODY MASS INDEX: 35.55 KG/M2 | HEIGHT: 66 IN | DIASTOLIC BLOOD PRESSURE: 74 MMHG

## 2019-08-16 DIAGNOSIS — Z98.84 HISTORY OF GASTRIC BYPASS: ICD-10-CM

## 2019-08-16 DIAGNOSIS — F33.2 SEVERE EPISODE OF RECURRENT MAJOR DEPRESSIVE DISORDER, WITHOUT PSYCHOTIC FEATURES (HCC): Primary | ICD-10-CM

## 2019-08-16 DIAGNOSIS — E16.1 REACTIVE HYPOGLYCEMIA: ICD-10-CM

## 2019-08-16 DIAGNOSIS — J98.01 BRONCHOSPASM: ICD-10-CM

## 2019-08-16 PROCEDURE — 99213 OFFICE O/P EST LOW 20 MIN: CPT | Performed by: PHYSICIAN ASSISTANT

## 2019-08-16 RX ORDER — PREDNISONE 10 MG/1
10 TABLET ORAL DAILY
Qty: 5 TABLET | Refills: 0 | Status: SHIPPED | OUTPATIENT
Start: 2019-08-16 | End: 2019-08-27 | Stop reason: SDUPTHER

## 2019-08-16 NOTE — PATIENT INSTRUCTIONS
Recommend vitamin D 5000 IU daily  Follow up with GI specialist about re-starting B12 injections  Call neurologist about recommendations for antidepressants  Patient to try otc Probiotics; given samples of florastor  Discussed results of recent blood test   Will repeat CBC in 3 months

## 2019-08-16 NOTE — PROGRESS NOTES
Assessment/Plan:         Diagnoses and all orders for this visit:    Bronchospasm  -     predniSONE 10 mg tablet; Take 1 tablet (10 mg total) by mouth daily    Severe episode of recurrent major depressive disorder, without psychotic features (HCC)  -     vortioxetine (TRINTELLIX) 10 MG tablet; Take 1 tablet (10 mg total) by mouth daily   History of gastric bypass-  Reactive hypoglycemia     Subjective:      Patient ID: Marty Chan is a 47 y o  female  47year old white female presents to discuss medications  URI working somewhat, but slow; still on antibiotics  Returned from vacation 8/1/19,  From [facility];  wife had URI sx  Feeling more irritable, no hyper sx  Always fatigued; Focus is good, good outlets and activities  Cough is somewhat dry  Reduced Trintellix to 10 mg  Daily  Has apt  With dietitian with endocrinologist office; also seeing endocrinologist           Review of Systems   Constitutional: Negative for appetite change, chills, diaphoresis, fatigue and fever  HENT: Positive for congestion, postnasal drip, sinus pressure, sinus pain, sore throat and voice change  Negative for ear pain and rhinorrhea  Respiratory: Positive for cough  Negative for shortness of breath and wheezing  Gastrointestinal: Positive for abdominal pain  Negative for nausea and vomiting  Rare abdominal pain  Psychiatric/Behavioral: Positive for agitation and behavioral problems  Negative for decreased concentration, dysphoric mood, hallucinations, self-injury, sleep disturbance and suicidal ideas  The patient is nervous/anxious  The patient is not hyperactive  Objective:      /74   Pulse 66   Ht 5' 6" (1 676 m)   Wt 100 kg (221 lb 3 2 oz)   LMP 12/21/2016   BMI 35 70 kg/m²          Physical Exam   Constitutional: She is oriented to person, place, and time  She appears well-developed and well-nourished  No distress  HENT:   Head: Normocephalic and atraumatic  Right Ear: External ear normal    Left Ear: External ear normal    Mouth/Throat: Oropharynx is clear and moist  No oropharyngeal exudate  Eyes: Conjunctivae and EOM are normal  Right eye exhibits no discharge  Left eye exhibits no discharge  No scleral icterus  Very narrow nasal opening, due to inflamed turbinates  Cardiovascular: Normal rate, regular rhythm and normal heart sounds  Pulmonary/Chest: Effort normal and breath sounds normal  No stridor  No respiratory distress  She has no wheezes  She has no rales  Neurological: She is alert and oriented to person, place, and time  No cranial nerve deficit  Coordination normal    Skin: She is not diaphoretic  Psychiatric: Her behavior is normal  Judgment and thought content normal    Somewhat low mood

## 2019-08-27 DIAGNOSIS — J98.01 BRONCHOSPASM: ICD-10-CM

## 2019-08-27 DIAGNOSIS — J32.9 SINUSITIS, UNSPECIFIED CHRONICITY, UNSPECIFIED LOCATION: Primary | ICD-10-CM

## 2019-08-27 RX ORDER — PREDNISONE 10 MG/1
10 TABLET ORAL DAILY
Qty: 5 TABLET | Refills: 0 | Status: SHIPPED | OUTPATIENT
Start: 2019-08-27 | End: 2019-11-08 | Stop reason: ALTCHOICE

## 2019-08-27 RX ORDER — CEFUROXIME AXETIL 250 MG/1
250 TABLET ORAL EVERY 12 HOURS SCHEDULED
Qty: 20 TABLET | Refills: 0 | Status: SHIPPED | OUTPATIENT
Start: 2019-08-27 | End: 2019-09-06

## 2019-08-27 RX ORDER — PROMETHAZINE HYDROCHLORIDE AND CODEINE PHOSPHATE 6.25; 1 MG/5ML; MG/5ML
5 SYRUP ORAL EVERY 4 HOURS PRN
Qty: 120 ML | Refills: 1 | Status: SHIPPED | OUTPATIENT
Start: 2019-08-27 | End: 2019-11-08 | Stop reason: ALTCHOICE

## 2019-08-28 ENCOUNTER — OFFICE VISIT (OUTPATIENT)
Dept: DIABETES SERVICES | Facility: HOSPITAL | Age: 54
End: 2019-08-28
Payer: COMMERCIAL

## 2019-08-28 VITALS — BODY MASS INDEX: 35.36 KG/M2 | HEIGHT: 66 IN | WEIGHT: 220 LBS

## 2019-08-28 DIAGNOSIS — E16.2 HYPOGLYCEMIA: Primary | ICD-10-CM

## 2019-08-28 PROCEDURE — 97802 MEDICAL NUTRITION INDIV IN: CPT | Performed by: DIETITIAN, REGISTERED

## 2019-08-28 NOTE — PROGRESS NOTES
Medical Nutrition Therapy     Assessment    Visit Type: Initial visit  Chief complaint:  Hypoglycemia    HPI:  Met with Donnia Leyden and her wife for initial MNT for hypoglycemia  Hx RYGB 2000, revision 6 years ago  She has recently been struggling with some low blood sugars, seems to be reactionary to a heavy carb meal, blood sugars will drop to 40s and feels symptoms  Food recall shows primary issues: carb intake varies a lot from meal to meal, some meals have no carbs and other meal might be 80g  Also likes concentrated sweets and has a reaction after them  Discussed the goal of 30g carbs per meal, at least 15g and protein with every meal and snack to steady blood sugars  Together we discussed what foods contain CHO, reading a food label, serving sizes  Put together sample meals for Malena's reference and evaluated Malena's current eating plan  Good understanding, Onesimo Santacruz will call with questions or for more education  Follow up as needed if not improving  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 since one time she had a low while driving in [place] and had to sit on the side of the road alone for a while  I mentioned the [employer]  This might be a good option for her as he could scan before driving and anytime she is worried or feels funny  Discussed variability in readings, but that it would tell her that is trending down or steady and still be valuable  I gave her a sample sensor and received from our office to try  If she likes it, she will call Dr Ariana Smith and ask for a prescription for refills  She is fine to pay cash for them if needed to use while away on business  Sample Lot: [id number]    Ht Readings from Last 1 Encounters:   08/28/19 5' 6" (1 676 m)     Wt Readings from Last 3 Encounters:   08/28/19 99 8 kg (220 lb)   08/16/19 100 kg (221 lb 3 2 oz)   08/14/19 99 8 kg (220 lb)        Body mass index is 35 51 kg/m²       Lab Results   Component Value Date    HGBA1C 5 2 08/05/2019 HGBA1C 4 9 10/05/2018    HGBA1C 4 9 10/05/2018       Lab Results   Component Value Date    CHOL 164 11/30/2017    CHOL 133 05/05/2015     Lab Results   Component Value Date    HDL 62 10/05/2018    HDL 59 11/30/2017    HDL 49 05/05/2015     Lab Results   Component Value Date    LDLCALC 85 11/30/2017    LDLCALC 72 05/05/2015     Lab Results   Component Value Date    TRIG 69 10/05/2018    TRIG 101 11/30/2017    TRIG 61 05/05/2015     No results found for: CHOLHDL    Weight Change: No    Medical Diagnosis/ICD 10 Code:  E16 2    Barriers to Learning: no barriers    Do you follow any special diet presently?: 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 and 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   Who shops: patient  Who cooks: patient    Food Log: Completed via the method of food recall    Breakfast: up around 7:30-8am  Coffee and 2nd coffee and breakfast 9am- sargento snack pack  Egg and cheese breakfast wrap from [employer]  Once a month a bagel half  Oatmeal with fruit at home    Morning Snack:not much, if so feeling low will be cashew 100cal packs, keeps apple juice if around   Lunch:12-2pm  Leftovers, salad today, often soup, if out a jr whopper, [employer] half sandwich and soup  Fruit sometimes  Afternoon Snack: rare  Dinner:5-7pm: meat starch veggie hot meal  Soups home made  Tries to make healthy things  She feels best as a vegetarian but likes meat, usually chicken, lots of spaghetti squash, zuchini squash casserole  Evening Snack:popcorn, harvest chips, small ice cream reacts to milk dumping sydrome  Not much sweets, a cookie after meals sometimes  Beverages: water or unsweet decaf tea, coffee 2 in the morning decaf black, no mixes, juice only as low treatment, rare soda regular, no milk, wine white, seltzer, alcohol after dinner 1-2  Drinks  Eating out/Take out:sometimes   Exercise : nothing structured       Nutrition Diagnosis:  Food and nutrition related knowledge deficit  related to Lack of prior exposure to accurate nutrition related information as evidenced by Verbalizes inaccurate or incomplete information    Intervention: increased fiber intake, label reading, behavior modification strategies, carbohydrate counting, increased protein intake, meal timing, individualized meal plan and monitoring portion control     Treatment Goals: Patient understands education and recommendations    Education Material Given  Ag Gonzalez was provided the Portion Booklet and Planning Healthy Meals     Monitoring and evaluation:    Term code indicator  FH 1 6 3 Carbohydrate Intake Criteria: 15-30g CHO per meal, 15g CHO snacks    Patients Response to Instruction:  Anu Brown  Expected Compliancegood    Thank you for coming to the [facility] for education today  Please feel free to call with any questions or concerns      Ziggy Sample, [address]  [address] [facility] [address]  [address] [facility] [id number]

## 2019-08-28 NOTE — Clinical Note
MNT for reactive hypoglycemia completed- for your records, no action needed   Thanks! [employer]

## 2019-11-08 ENCOUNTER — OFFICE VISIT (OUTPATIENT)
Dept: FAMILY MEDICINE CLINIC | Facility: HOSPITAL | Age: 54
End: 2019-11-08
Payer: COMMERCIAL

## 2019-11-08 VITALS
HEIGHT: 66 IN | HEART RATE: 58 BPM | WEIGHT: 221 LBS | SYSTOLIC BLOOD PRESSURE: 112 MMHG | BODY MASS INDEX: 35.52 KG/M2 | RESPIRATION RATE: 16 BRPM | DIASTOLIC BLOOD PRESSURE: 72 MMHG

## 2019-11-08 DIAGNOSIS — Z12.31 OTHER SCREENING MAMMOGRAM: ICD-10-CM

## 2019-11-08 DIAGNOSIS — Z12.11 SCREENING FOR COLON CANCER: ICD-10-CM

## 2019-11-08 DIAGNOSIS — I10 ESSENTIAL HYPERTENSION: Primary | ICD-10-CM

## 2019-11-08 DIAGNOSIS — E16.2 HYPOGLYCEMIA: ICD-10-CM

## 2019-11-08 DIAGNOSIS — F41.9 ANXIETY: ICD-10-CM

## 2019-11-08 DIAGNOSIS — R51.9 ACUTE INTRACTABLE HEADACHE, UNSPECIFIED HEADACHE TYPE: ICD-10-CM

## 2019-11-08 PROBLEM — J06.9 VIRAL UPPER RESPIRATORY TRACT INFECTION: Status: RESOLVED | Noted: 2019-08-14 | Resolved: 2019-11-08

## 2019-11-08 PROCEDURE — 99214 OFFICE O/P EST MOD 30 MIN: CPT | Performed by: PHYSICIAN ASSISTANT

## 2019-11-08 PROCEDURE — 1036F TOBACCO NON-USER: CPT | Performed by: PHYSICIAN ASSISTANT

## 2019-11-08 RX ORDER — LORAZEPAM 1 MG/1
1 TABLET ORAL 3 TIMES DAILY
Qty: 90 TABLET | Refills: 3 | Status: SHIPPED | OUTPATIENT
Start: 2019-11-08 | End: 2020-06-18

## 2019-11-08 NOTE — PATIENT INSTRUCTIONS
Obesity   AMBULATORY CARE:   Obesity  is when your body mass index (BMI) is greater than 30  Your healthcare provider will use your height and weight to measure your BMI  The risks of obesity include  many health problems, such as injuries or physical disability  You may need tests to check for the following:  · Diabetes     · High blood pressure or high cholesterol     · Heart disease     · Gallbladder or liver disease     · Cancer of the colon, breast, prostate, liver, or kidney     · Sleep apnea     · Arthritis or gout  Seek care immediately if:   · You have a severe headache, confusion, or difficulty speaking  · You have weakness on one side of your body  · You have chest pain, sweating, or shortness of breath  Contact your healthcare provider if:   · You have symptoms of gallbladder or liver disease, such as pain in your upper abdomen  · You have knee or hip pain and discomfort while walking  · You have symptoms of diabetes, such as intense hunger and thirst, and frequent urination  · You have symptoms of sleep apnea, such as snoring or daytime sleepiness  · You have questions or concerns about your condition or care  Treatment for obesity  focuses on helping you lose weight to improve your health  Even a small decrease in BMI can reduce the risk for many health problems  Your healthcare provider will help you set a weight-loss goal   · Lifestyle changes  are the first step in treating obesity  These include making healthy food choices and getting regular physical activity  Your healthcare provider may suggest a weight-loss program that involves coaching, education, and therapy  · Medicine  may help you lose weight when it is used with a healthy diet and physical activity  · Surgery  can help you lose weight if you are very obese and have other health problems  There are several types of weight-loss surgery  Ask your healthcare provider for more information    Be successful losing weight:   · Set small, realistic goals  An example of a small goal is to walk for 20 minutes 5 days a week  Anther goal is to lose 5% of your body weight  · Tell friends, family members, and coworkers about your goals  and ask for their support  Ask a friend to lose weight with you, or join a weight-loss support group  · Identify foods or triggers that may cause you to overeat , and find ways to avoid them  Remove tempting high-calorie foods from your home and workplace  Place a bowl of fresh fruit on your kitchen counter  If stress causes you to eat, then find other ways to cope with stress  · Keep a diary to track what you eat and drink  Also write down how many minutes of physical activity you do each day  Weigh yourself once a week and record it in your diary  Eating changes: You will need to eat 500 to 1,000 fewer calories each day than you currently eat to lose 1 to 2 pounds a week  The following changes will help you cut calories:  · Eat smaller portions  Use small plates, no larger than 9 inches in diameter  Fill your plate half full of fruits and vegetables  Measure your food using measuring cups until you know what a serving size looks like  · Eat 3 meals and 1 or 2 snacks each day  Plan your meals in advance  Lavetta Fuss and eat at home most of the time  Eat slowly  · Eat fruits and vegetables at every meal   They are low in calories and high in fiber, which makes you feel full  Do not add butter, margarine, or cream sauce to vegetables  Use herbs to season steamed vegetables  · Eat less fat and fewer fried foods  Eat more baked or grilled chicken and fish  These protein sources are lower in calories and fat than red meat  Limit fast food  Dress your salads with olive oil and vinegar instead of bottled dressing  · Limit the amount of sugar you eat  Do not drink sugary beverages  Limit alcohol  Activity changes:  Physical activity is good for your body in many ways   It helps you burn calories and build strong muscles  It decreases stress and depression, and improves your mood  It can also help you sleep better  Talk to your healthcare provider before you begin an exercise program   · Exercise for at least 30 minutes 5 days a week  Start slowly  Set aside time each day for physical activity that you enjoy and that is convenient for you  It is best to do both weight training and an activity that increases your heart rate, such as walking, bicycling, or swimming  · Find ways to be more active  Do yard work and housecleaning  Walk up the stairs instead of using elevators  Spend your leisure time going to events that require walking, such as outdoor festivals or fairs  This extra physical activity can help you lose weight and keep it off  Follow up with your healthcare provider as directed: You may need to meet with a dietitian  Write down your questions so you remember to ask them during your visits  © 2017 [employer] Information is for End User's use only and may not be sold, redistributed or otherwise used for commercial purposes  All illustrations and images included in CareNotes® are the copyrighted property of [employer] , [employer]  or Tomer Hebert  The above information is an  only  It is not intended as medical advice for individual conditions or treatments  Talk to your doctor, nurse or pharmacist before following any medical regimen to see if it is safe and effective for you  Weight Management   AMBULATORY CARE:   Why it is important to manage your weight:  Being overweight increases your risk of health conditions such as heart disease, high blood pressure, type 2 diabetes, and certain types of cancer  It can also increase your risk for osteoarthritis, sleep apnea, and other respiratory problems  Aim for a slow, steady weight loss  Even a small amount of weight loss can lower your risk of health problems    How to lose weight safely:  A safe and healthy way to lose weight is to eat fewer calories and get regular exercise  You can lose up about 1 pound a week by decreasing the number of calories you eat by 500 calories each day  You can decrease calories by eating smaller portion sizes or by cutting out high-calorie foods  Read labels to find out how many calories are in the foods you eat  You can also burn calories with exercise such as walking, swimming, or biking  You will be more likely to keep weight off if you make these changes part of your lifestyle  Healthy meal plan for weight management:  A healthy meal plan includes a variety of foods, contains fewer calories, and helps you stay healthy  A healthy meal plan includes the following:  · Eat whole-grain foods more often  A healthy meal plan should contain fiber  Fiber is the part of grains, fruits, and vegetables that is not broken down by your body  Whole-grain foods are healthy and provide extra fiber in your diet  Some examples of whole-grain foods are whole-wheat breads and pastas, oatmeal, brown rice, and bulgur  · Eat a variety of vegetables every day  Include dark, leafy greens such as spinach, kale, monika greens, and mustard greens  Eat yellow and orange vegetables such as carrots, sweet potatoes, and winter squash  · Eat a variety of fruits every day  Choose fresh or canned fruit (canned in its own juice or light syrup) instead of juice  Fruit juice has very little or no fiber  · Eat low-fat dairy foods  Drink fat-free (skim) milk or 1% milk  Eat fat-free yogurt and low-fat cottage cheese  Try low-fat cheeses such as mozzarella and other reduced-fat cheeses  · Choose meat and other protein foods that are low in fat  Choose beans or other legumes such as split peas or lentils  Choose fish, skinless poultry (chicken or turkey), or lean cuts of red meat (beef or pork)  Before you cook meat or poultry, cut off any visible fat  · Use less fat and oil  Try baking foods instead of frying them  Add less fat, such as margarine, sour cream, regular salad dressing and mayonnaise to foods  Eat fewer high-fat foods  Some examples of high-fat foods include french fries, doughnuts, ice cream, and cakes  · Eat fewer sweets  Limit foods and drinks that are high in sugar  This includes candy, cookies, regular soda, and sweetened drinks  Ways to decrease calories:   · Eat smaller portions  ¨ Use a small plate with smaller servings  ¨ Do not eat second helpings  ¨ When you eat at a restaurant, ask for a box and place half of your meal in the box before you eat  ¨ Share an entrée with someone else  · Replace high-calorie snacks with healthy, low-calorie snacks  ¨ Choose fresh fruit, vegetables, fat-free rice cakes, or air-popped popcorn instead of potato chips, nuts, or chocolate  ¨ Choose water or calorie-free drinks instead of soda or sweetened drinks  · Eat regular meals  Skipping meals can lead to overeating later in the day  Eat a healthy snack in place of a meal if you do not have time to eat a regular meal      · Do not shop for groceries when you are hungry  You may be more likely to make unhealthy food choices  Take a grocery list of healthy foods and shop after you have eaten  Exercise:  Exercise at least 30 minutes per day on most days of the week  Some examples of exercise include walking, biking, dancing, and swimming  You can also fit in more physical activity by taking the stairs instead of the elevator or parking farther away from stores  Ask your healthcare provider about the best exercise plan for you  Other things to consider as you try to lose weight:   · Be aware of situations that may give you the urge to overeat, such as eating while watching television  Find ways to avoid these situations  For example, read a book, go for a walk, or do crafts      · Meet with a weight loss support group or friends who are also trying to lose weight  This may help you stay motivated to continue working on your weight loss goals  © 2017 [employer]  Information is for End User's use only and may not be sold, redistributed or otherwise used for commercial purposes  All illustrations and images included in CareNotes® are the copyrighted property of [employer]  or [facility]  The above information is an  only  It is not intended as medical advice for individual conditions or treatments  Talk to your doctor, nurse or pharmacist before following any medical regimen to see if it is safe and effective for you      Continue to monitor glucose and blood pressure, will order cologuard, and screening mammogram

## 2019-11-08 NOTE — PROGRESS NOTES
Assessment/Plan:         Diagnoses and all orders for this visit:    Other screening mammogram  -     Mammo screening bilateral w 3d & cad; Future     Anxiety-  Recommend more regular use of Ativan tid  HTN- monitor blood pressure  Obesity       Subjective:      Patient ID: Rose Varela is a 49 y o  female  49year old white female presents for follow up  Not as closely monitoring blood pressure, checking glucose, saw endocrinologist and nutritionist     Trying to monitor diet  Has episodes of low glucose, gets shakes; mood is affected  Requesting info on colon cancer screening tests/options  LNMP-  2 years ago  Last pap/well woman exam-  2012  Review of Systems   Constitutional: Negative for chills, diaphoresis, fatigue and fever  Respiratory: Negative for cough and shortness of breath  Gastrointestinal: Negative for abdominal pain, constipation, diarrhea, nausea and vomiting  Musculoskeletal: Negative for arthralgias, back pain, myalgias, neck pain and neck stiffness  Psychiatric/Behavioral: The patient is nervous/anxious  Objective:      /72   Pulse 58   Resp 16   Ht 5' 6" (1 676 m)   Wt 100 kg (221 lb)   LMP 12/21/2016   BMI 35 67 kg/m²          Physical Exam   Constitutional: She is oriented to person, place, and time  She appears well-developed and well-nourished  No distress  Cardiovascular: Normal rate, regular rhythm and normal heart sounds  Pulmonary/Chest: Effort normal and breath sounds normal  No stridor  No respiratory distress  She has no wheezes  She has no rales  Musculoskeletal: Normal range of motion  She exhibits no edema, tenderness or deformity  Neurological: She is alert and oriented to person, place, and time  Skin: She is not diaphoretic  Psychiatric: She has a normal mood and affect  Her behavior is normal  Judgment and thought content normal    Nursing note and vitals reviewed  BMI Counseling:  Body mass index is 35 67 kg/m²  The BMI is above normal  Nutrition recommendations include 3-5 servings of fruits/vegetables daily

## 2019-11-21 DIAGNOSIS — R73.09: Primary | ICD-10-CM

## 2019-11-21 RX ORDER — BLOOD-GLUCOSE METER
EACH MISCELLANEOUS
Qty: 1 KIT | Refills: 0 | Status: SHIPPED | OUTPATIENT
Start: 2019-11-21 | End: 2020-10-14 | Stop reason: CLARIF

## 2019-12-12 LAB
ACTH PLAS-MCNC: 23.1 PG/ML (ref 7.2–63.3)
ALBUMIN SERPL-MCNC: 4.5 G/DL (ref 3.5–5.5)
ALBUMIN/GLOB SERPL: 2.4 {RATIO} (ref 1.2–2.2)
ALP SERPL-CCNC: 43 IU/L (ref 39–117)
ALT SERPL-CCNC: 16 IU/L (ref 0–32)
AST SERPL-CCNC: 23 IU/L (ref 0–40)
BILIRUB SERPL-MCNC: 0.5 MG/DL (ref 0–1.2)
BUN SERPL-MCNC: 14 MG/DL (ref 6–24)
BUN/CREAT SERPL: 18 (ref 9–23)
CALCIUM SERPL-MCNC: 9.2 MG/DL (ref 8.7–10.2)
CHLORIDE SERPL-SCNC: 99 MMOL/L (ref 96–106)
CO2 SERPL-SCNC: 26 MMOL/L (ref 20–29)
CORTIS AM PEAK SERPL-MCNC: 12.4 UG/DL (ref 6.2–19.4)
CREAT SERPL-MCNC: 0.78 MG/DL (ref 0.57–1)
GLOBULIN SER-MCNC: 1.9 G/DL (ref 1.5–4.5)
GLUCOSE SERPL-MCNC: 86 MG/DL (ref 65–99)
POTASSIUM SERPL-SCNC: 4.2 MMOL/L (ref 3.5–5.2)
PROT SERPL-MCNC: 6.4 G/DL (ref 6–8.5)
SL AMB EGFR AFRICAN AMERICAN: 100 ML/MIN/1.73
SL AMB EGFR NON AFRICAN AMERICAN: 87 ML/MIN/1.73
SODIUM SERPL-SCNC: 139 MMOL/L (ref 134–144)

## 2019-12-16 DIAGNOSIS — I10 ESSENTIAL HYPERTENSION: ICD-10-CM

## 2019-12-16 RX ORDER — LISINOPRIL 10 MG/1
TABLET ORAL
Qty: 90 TABLET | Refills: 1 | Status: SHIPPED | OUTPATIENT
Start: 2019-12-16 | End: 2020-06-22

## 2019-12-19 ENCOUNTER — OFFICE VISIT (OUTPATIENT)
Dept: ENDOCRINOLOGY | Facility: HOSPITAL | Age: 54
End: 2019-12-19
Payer: COMMERCIAL

## 2019-12-19 VITALS
BODY MASS INDEX: 35.68 KG/M2 | HEART RATE: 56 BPM | SYSTOLIC BLOOD PRESSURE: 108 MMHG | WEIGHT: 222 LBS | HEIGHT: 66 IN | DIASTOLIC BLOOD PRESSURE: 72 MMHG

## 2019-12-19 DIAGNOSIS — E16.1 REACTIVE HYPOGLYCEMIA: Primary | ICD-10-CM

## 2019-12-19 DIAGNOSIS — Z98.84 HISTORY OF GASTRIC BYPASS: ICD-10-CM

## 2019-12-19 DIAGNOSIS — E16.2 HYPOGLYCEMIA: ICD-10-CM

## 2019-12-19 PROCEDURE — 99214 OFFICE O/P EST MOD 30 MIN: CPT | Performed by: INTERNAL MEDICINE

## 2019-12-19 RX ORDER — BLOOD-GLUCOSE SENSOR
EACH MISCELLANEOUS
Qty: 3 EACH | Refills: 11 | Status: SHIPPED | OUTPATIENT
Start: 2019-12-19 | End: 2020-02-07 | Stop reason: SDUPTHER

## 2019-12-19 RX ORDER — BLOOD-GLUCOSE,RECEIVER,CONT
EACH MISCELLANEOUS
Qty: 1 DEVICE | Refills: 0 | Status: SHIPPED | OUTPATIENT
Start: 2019-12-19 | End: 2020-05-08 | Stop reason: ALTCHOICE

## 2019-12-19 RX ORDER — BLOOD-GLUCOSE TRANSMITTER
EACH MISCELLANEOUS
Qty: 1 EACH | Refills: 3 | Status: SHIPPED | OUTPATIENT
Start: 2019-12-19 | End: 2020-05-08 | Stop reason: ALTCHOICE

## 2019-12-19 NOTE — PROGRESS NOTES
12/19/2019    Assessment/Plan      Diagnoses and all orders for this visit:    Reactive hypoglycemia  -     Ambulatory referral to Diabetic Education; Future  -     Continuous Blood Gluc  ([address]) MEGHA; As directed  -     Continuous Blood Gluc Sensor (DEXCOM G6 SENSOR) MISC; q 10 days  -     Continuous Blood Gluc Transmit (DEXCOM G6 TRANSMITTER) MISC; q 90 days    Hypoglycemia  -     Continuous Blood Gluc  ([address]) MEGHA; As directed  -     Continuous Blood Gluc Sensor (DEXCOM G6 SENSOR) MISC; q 10 days  -     Continuous Blood Gluc Transmit (DEXCOM G6 TRANSMITTER) MISC; q 90 days    History of gastric bypass  -     Continuous Blood Gluc  ([address]) MEGHA; As directed  -     Continuous Blood Gluc Sensor (DEXCOM G6 SENSOR) MISC; q 10 days  -     Continuous Blood Gluc Transmit (DEXCOM G6 TRANSMITTER) MISC; q 90 days        Assessment/Plan:  Post gastric bypass hypoglycemia:  We spent some time discussing that in terms of treatment options, dietary modifications remained the mainstay of treatment  When she utilize an appropriate diet, her blood sugars are stable  We discussed there medication options that are out there should she start developing hypoglycemia more often  For now she admits that she can be more consistent in her diet and we will choose lifestyle measures for now  In terms of monitoring, she does have an Accu-Chek glucometer  I think that given her frequency of hypoglycemia, in order to prevent severe hypoglycemia continues glucose monitor will be helpful  I provided a prescription for a dex com which she will look into getting  She will call us if she is able to set up an education appointment to get this set up        CC:  Follow-up    History of Present Illness     HPI: Janelle Rm is a 49y o  year old female with history of gastric bypass surgery with Saul-en-Y about 20 years ago, hypertension, prediabetes before gastric bypass who presents for follow-up of hypoglycemia in relation to gastric bypass surgery  This all started around December of 2018 when she was evaluated in the emergency department for syncope which was suspected to be due to hypoglycemia  After that, she did have some reportedly confirmed blood sugars in the 20s and 30s  This typically occurs after meals  See my initial consult from 08/08/2019 for further details  At that appointment, we discussed dietary intervention  She met with a dietitian  She presents today overall feeling well  She continues to have hypoglycemia periodically  She reports that if she maintains an appropriate diet as discussed at her dietitian appointment at the end of August, her blood sugars remained stable  However if she has a high carbohydrate or sugar snack or meal, she will have spikes in her blood sugars which later crashes in causes hypoglycemia  She has started monitoring sugars and used a bib freestyle cgm and she enjoyed having this information AND and help prevent hypoglycemia  Review of Systems   Constitutional: Negative for fatigue  HENT: Negative for trouble swallowing and voice change  Eyes: Negative for visual disturbance  Respiratory: Negative for shortness of breath  Cardiovascular: Negative for palpitations and leg swelling  Gastrointestinal: Negative for abdominal pain, nausea and vomiting  Endocrine: Negative for polydipsia and polyuria  Musculoskeletal: Negative for arthralgias and myalgias  Skin: Negative for rash  Neurological: Negative for dizziness, tremors and weakness  Hematological: Negative for adenopathy  Psychiatric/Behavioral: Negative for agitation and confusion         Historical Information   Past Medical History:   Diagnosis Date    Anxiety     Hypertension     Reversible cerebrovascular vasoconstriction syndrome     Tremor      Past Surgical History:   Procedure Laterality Date    CHOLECYSTECTOMY      GASTRIC BYPASS 12/08/2000    for morbid obesity, last assessed 4/27/16     Social History   Social History     Substance and Sexual Activity   Alcohol Use Yes    Frequency: 2-3 times a week    Drinks per session: 1 or 2    Binge frequency: Never    Comment: Social drinker per [employer]     Social History     Substance and Sexual Activity   Drug Use No     Social History     Tobacco Use   Smoking Status Former Smoker    Types: Cigarettes   Smokeless Tobacco Never Used   Tobacco Comment    social smoker, quit 2017 / never a smoker denied      Family History:   Family History   Problem Relation Age of Onset    Hypertension Mother     Diabetes Mother     Hypertension Father     Heart disease Brother     Heart attack Brother     Substance Abuse Brother     Heart failure Brother     Diabetes Brother     Hypertension Brother     Substance Abuse Sister     Substance Abuse Family     Mental illness Neg Hx         neg fam hx       Meds/Allergies   Current Outpatient Medications   Medication Sig Dispense Refill    B Complex Vitamins (VITAMIN B COMPLEX PO) Place under the tongue      Blood Glucose Monitoring Suppl (ACCU-CHEK LUCILA PLUS) w/Device KIT Testing twice daily 1 kit 0    Calcium Carbonate-Vit D-Min (CALCIUM 1200 PO) Take 1 capsule by mouth 4 (four) times a day      glucose blood (ACCU-CHEK LUCILA PLUS) test strip Testing 2 daily due to fluctuation in glucose levels 50 each 5    lisinopril (ZESTRIL) 10 mg tablet TAKE 1 TABLET BY MOUTH ONCE DAILY 90 tablet 1    LORazepam (ATIVAN) 1 mg tablet Take 1 tablet (1 mg total) by mouth 3 (three) times a day 90 tablet 3    Multiple Vitamin-Folic Acid TABS Take 1 tablet by mouth daily      sucralfate (CARAFATE) 1 g tablet Take 1 tablet (1 g total) by mouth every 6 (six) hours as needed (pain) 20 tablet 0    triamterene-hydrochlorothiazide (DYAZIDE) 37 5-25 mg per capsule Take 1 capsule by mouth daily 90 capsule 3    vortioxetine (TRINTELLIX) 10 MG tablet Take 1 tablet (10 mg total) by mouth daily 30 tablet 4    Continuous Blood Gluc  ([address]) MEGHA As directed  1 Device 0    Continuous Blood Gluc Sensor (DEXCOM G6 SENSOR) MISC q 10 days 3 each 11    Continuous Blood Gluc Transmit (DEXCOM G6 TRANSMITTER) MISC q 90 days 1 each 3     No current facility-administered medications for this visit  No Known Allergies    Objective   Vitals: Blood pressure 108/72, pulse 56, height 5' 6" (1 676 m), weight 101 kg (222 lb), last menstrual period 12/21/2016, not currently breastfeeding  Invasive Devices     None                 Physical Exam   Constitutional: She is oriented to person, place, and time  She appears well-developed and well-nourished  No distress  HENT:   Head: Normocephalic and atraumatic  Neck: Normal range of motion  Neck supple  No thyromegaly present  Cardiovascular: Normal rate and regular rhythm  Pulmonary/Chest: Effort normal and breath sounds normal  No respiratory distress  Abdominal: Soft  Bowel sounds are normal    Musculoskeletal: Normal range of motion  She exhibits no edema  Neurological: She is alert and oriented to person, place, and time  She exhibits normal muscle tone  Skin: Skin is warm and dry  No rash noted  She is not diaphoretic  Psychiatric: She has a normal mood and affect  Her behavior is normal    Vitals reviewed  The history was obtained from the review of the chart and from the patient      Lab Results:      Recent Results (from the past 40442 hour(s))   ECG 12 lead    Collection Time: 12/23/18  3:35 PM   Result Value Ref Range    Ventricular Rate 66 BPM    Atrial Rate 66 BPM    [place] Interval 158 ms    QRSD Interval 82 ms    QT Interval 396 ms    QTC Interval 415 ms    P [place] 44 degrees    QRS Axis 37 degrees    T Wave Axis 46 degrees   Comprehensive metabolic panel    Collection Time: 12/23/18  3:38 PM   Result Value Ref Range    Sodium 140 136 - 145 mmol/L    Potassium 4 1 3 5 - 5 3 mmol/L    Chloride 99 (L) 100 - 108 mmol/L    CO2 29 21 - 32 mmol/L    ANION GAP 12 4 - 13 mmol/L    BUN 17 5 - 25 mg/dL    Creatinine 0 79 0 60 - 1 30 mg/dL    Glucose 117 65 - 140 mg/dL    Calcium 8 6 8 3 - 10 1 mg/dL     (H) 5 - 45 U/L     (H) 12 - 78 U/L    Alkaline Phosphatase 52 46 - 116 U/L    Total Protein 7 0 6 4 - 8 2 g/dL    Albumin 3 6 3 5 - 5 0 g/dL    Total Bilirubin 0 50 0 20 - 1 00 mg/dL    eGFR 86 ml/min/1 73sq m   CBC and differential    Collection Time: 12/23/18  3:38 PM   Result Value Ref Range    WBC 4 14 (L) 4 31 - 10 16 Thousand/uL    RBC 4 39 3 81 - 5 12 Million/uL    Hemoglobin 13 6 11 5 - 15 4 g/dL    Hematocrit 41 8 34 8 - 46 1 %    MCV 95 82 - 98 fL    MCH 31 0 26 8 - 34 3 pg    MCHC 32 5 31 4 - 37 4 g/dL    RDW 11 3 (L) 11 6 - 15 1 %    MPV 9 8 8 9 - 12 7 fL    Platelets 134 609 - 062 Thousands/uL    nRBC 0 /100 WBCs    Neutrophils Relative 62 43 - 75 %    Immat GRANS % 0 0 - 2 %    Lymphocytes Relative 31 14 - 44 %    Monocytes Relative 7 4 - 12 %    Eosinophils Relative 0 0 - 6 %    Basophils Relative 0 0 - 1 %    Neutrophils Absolute 2 56 1 85 - 7 62 Thousands/µL    Immature Grans Absolute 0 01 0 00 - 0 20 Thousand/uL    Lymphocytes Absolute 1 27 0 60 - 4 47 Thousands/µL    Monocytes Absolute 0 28 0 17 - 1 22 Thousand/µL    Eosinophils Absolute 0 01 0 00 - 0 61 Thousand/µL    Basophils Absolute 0 01 0 00 - 0 10 Thousands/µL   Lipase    Collection Time: 12/23/18  3:38 PM   Result Value Ref Range    Lipase 148 73 - 393 u/L   Troponin I    Collection Time: 12/23/18  3:38 PM   Result Value Ref Range    Troponin I <0 02 <=0 04 ng/mL   CBC and differential    Collection Time: 08/05/19  9:25 AM   Result Value Ref Range    White Blood Cell Count 3 0 (L) 3 4 - 10 8 x10E3/uL    Red Blood Cell Count 4 61 3 77 - 5 28 x10E6/uL    Hemoglobin 13 8 11 1 - 15 9 g/dL    HCT 40 1 34 0 - 46 6 %    MCV 87 79 - 97 fL    MCH 29 9 26 6 - 33 0 pg    MCHC 34 4 31 5 - 35 7 g/dL    RDW 13 5 12 3 - 15 4 %    Platelet Count 161 150 - 450 x10E3/uL    Neutrophils 52 Not Estab  %    Lymphocytes 41 Not Estab  %    Monocytes 7 Not Estab  %    Eosinophils 0 Not Estab  %    Basophils PCT 0 Not Estab  %    Neutrophils (Absolute) 1 5 1 4 - 7 0 x10E3/uL    Lymphocytes (Absolute) 1 2 0 7 - 3 1 x10E3/uL    Monocytes (Absolute) 0 2 0 1 - 0 9 x10E3/uL    Eosinophils (Absolute) 0 0 0 0 - 0 4 x10E3/uL    Basophils ABS 0 0 0 0 - 0 2 x10E3/uL    Immature Granulocytes 0 Not Estab  %    Immature Granulocytes (Absolute) 0 0 0 0 - 0 1 x10E3/uL   Comprehensive metabolic panel    Collection Time: 08/05/19  9:25 AM   Result Value Ref Range    Glucose, Random 84 65 - 99 mg/dL    BUN 17 6 - 24 mg/dL    Creatinine 0 73 0 57 - 1 00 mg/dL    eGFR Non African American 95 >59 mL/min/1 73    eGFR  110 >59 mL/min/1 73    SL AMB BUN/CREATININE RATIO 23 9 - 23    Sodium 142 134 - 144 mmol/L    Potassium 4 0 3 5 - 5 2 mmol/L    Chloride 103 96 - 106 mmol/L    CO2 28 20 - 29 mmol/L    CALCIUM 9 4 8 7 - 10 2 mg/dL    Protein, Total 6 4 6 0 - 8 5 g/dL    Albumin 4 3 3 5 - 5 5 g/dL    Globulin, Total 2 1 1 5 - 4 5 g/dL    Albumin/Globulin Ratio 2 0 1 2 - 2 2    TOTAL BILIRUBIN 0 5 0 0 - 1 2 mg/dL    Alk Phos Isoenzymes 47 39 - 117 IU/L    AST 21 0 - 40 IU/L    ALT 16 0 - 32 IU/L   Hemoglobin A1c (w/out EAG)    Collection Time: 08/05/19  9:25 AM   Result Value Ref Range    Hemoglobin A1C 5 2 4 8 - 5 6 %   T4, free    Collection Time: 08/05/19  9:25 AM   Result Value Ref Range    Free t4 1 16 0 82 - 1 77 ng/dL   TSH, 3rd generation    Collection Time: 08/05/19  9:25 AM   Result Value Ref Range    TSH 1 120 0 450 - 4 500 uIU/mL   Vitamin D 25 hydroxy    Collection Time: 08/05/19  9:25 AM   Result Value Ref Range    25-HYDROXY VIT D 33 7 30 0 - 100 0 ng/mL   Iron    Collection Time: 08/05/19  9:25 AM   Result Value Ref Range    Iron, Serum 108 27 - 159 ug/dL   Vitamin B12    Collection Time: 08/05/19  9:25 AM   Result Value Ref Range    Vitamin B-12 <150 (L) 232 - 1,245 pg/mL   Magnesium    Collection Time: 08/05/19  9:25 AM   Result Value Ref Range    Magnesium, Serum 1 9 1 6 - 2 3 mg/dL   Insulin, 1 Hour    Collection Time: 08/05/19  9:25 AM   Result Value Ref Range    Insulin, 1/2 hour 5 0 2 6 - 24 9 uIU/mL   Comprehensive metabolic panel    Collection Time: 12/10/19  8:01 AM   Result Value Ref Range    Glucose, Random 86 65 - 99 mg/dL    BUN 14 6 - 24 mg/dL    Creatinine 0 78 0 57 - 1 00 mg/dL    eGFR Non  87 >59 mL/min/1 73    eGFR African American 100 >59 mL/min/1 73    SL AMB BUN/CREATININE RATIO 18 9 - 23    Sodium 139 134 - 144 mmol/L    Potassium 4 2 3 5 - 5 2 mmol/L    Chloride 99 96 - 106 mmol/L    CO2 26 20 - 29 mmol/L    CALCIUM 9 2 8 7 - 10 2 mg/dL    Protein, Total 6 4 6 0 - 8 5 g/dL    Albumin 4 5 3 5 - 5 5 g/dL    Globulin, Total 1 9 1 5 - 4 5 g/dL    Albumin/Globulin Ratio 2 4 (H) 1 2 - 2 2    TOTAL BILIRUBIN 0 5 0 0 - 1 2 mg/dL    Alk Phos Isoenzymes 43 39 - 117 IU/L    AST 23 0 - 40 IU/L    ALT 16 0 - 32 IU/L   ACTH    Collection Time: 12/10/19  8:01 AM   Result Value Ref Range    ACTH 23 1 7 2 - 63 3 pg/mL   Cortisol Level, AM Specimen    Collection Time: 12/10/19  8:01 AM   Result Value Ref Range    Cortisol AM 12 4 6 2 - 19 4 ug/dL         Future Appointments   Date Time Provider Jesse Swan   2/7/2020  8:45 AM Leann Romero PA-C [facility]   4/24/2020  9:10 AM Jabari Dumont,  [facility]       Portions of the record may have been created with voice recognition software  Occasional wrong word or "sound a like" substitutions may have occurred due to the inherent limitations of voice recognition software  Read the chart carefully and recognize, using context, where substitutions have occurred

## 2020-02-07 ENCOUNTER — OFFICE VISIT (OUTPATIENT)
Dept: FAMILY MEDICINE CLINIC | Facility: HOSPITAL | Age: 55
End: 2020-02-07
Payer: COMMERCIAL

## 2020-02-07 VITALS
DIASTOLIC BLOOD PRESSURE: 70 MMHG | OXYGEN SATURATION: 98 % | SYSTOLIC BLOOD PRESSURE: 110 MMHG | BODY MASS INDEX: 33.68 KG/M2 | HEIGHT: 66 IN | HEART RATE: 66 BPM | WEIGHT: 209.6 LBS

## 2020-02-07 DIAGNOSIS — Z98.84 HISTORY OF GASTRIC BYPASS: Primary | Chronic | ICD-10-CM

## 2020-02-07 DIAGNOSIS — E66.09 OBESITY DUE TO EXCESS CALORIES WITHOUT SERIOUS COMORBIDITY, UNSPECIFIED CLASSIFICATION: Chronic | ICD-10-CM

## 2020-02-07 DIAGNOSIS — I10 ESSENTIAL HYPERTENSION: Chronic | ICD-10-CM

## 2020-02-07 DIAGNOSIS — F41.9 ANXIETY: ICD-10-CM

## 2020-02-07 PROBLEM — I60.9 SAH (SUBARACHNOID HEMORRHAGE) (HCC): Status: RESOLVED | Noted: 2018-01-23 | Resolved: 2020-02-07

## 2020-02-07 PROCEDURE — 99213 OFFICE O/P EST LOW 20 MIN: CPT | Performed by: PHYSICIAN ASSISTANT

## 2020-02-07 PROCEDURE — 1036F TOBACCO NON-USER: CPT | Performed by: PHYSICIAN ASSISTANT

## 2020-02-07 PROCEDURE — 3078F DIAST BP <80 MM HG: CPT | Performed by: PHYSICIAN ASSISTANT

## 2020-02-07 PROCEDURE — 3008F BODY MASS INDEX DOCD: CPT | Performed by: PHYSICIAN ASSISTANT

## 2020-02-07 PROCEDURE — 3074F SYST BP LT 130 MM HG: CPT | Performed by: PHYSICIAN ASSISTANT

## 2020-02-07 NOTE — PATIENT INSTRUCTIONS
Recommend vitamin D 5000 IU daily  Continue current meds, and monitoring glucose, and blood pressure

## 2020-02-07 NOTE — PROGRESS NOTES
BMI Counseling: Body mass index is 33 83 kg/m²  The BMI is above normal  Nutrition recommendations include 3-5 servings of fruits/vegetables daily  Assessment/Plan:    HTN  Anxiety  History of gastric bypass     Subjective:      Patient ID: Edi Cano is a 49 y o  female  49year old white female presents for routine follow up  Insurance not paying for trintellix, has to take that med  Due to hx  Of brain stroke, and HTN  Trying new diet, vegan and oil free, has more energy and losing weight  Monitors glucose and blood pressure-  All wnl  Review of Systems   Constitutional: Negative for chills, diaphoresis, fatigue and fever  Respiratory: Negative for cough and shortness of breath  Gastrointestinal: Negative for abdominal pain, constipation, diarrhea, nausea and vomiting  Endocrine: Negative for polydipsia, polyphagia and polyuria  Psychiatric/Behavioral: Negative for agitation, decreased concentration, dysphoric mood, self-injury, sleep disturbance and suicidal ideas  The patient is not nervous/anxious  Sx  Controlled with meds  Objective:      /80   Pulse 66   Ht 5' 6" (1 676 m)   Wt 95 1 kg (209 lb 9 6 oz)   LMP 12/21/2016 (LMP Unknown)   SpO2 98%   BMI 33 83 kg/m²          Physical Exam   Constitutional: She is oriented to person, place, and time  She appears well-developed and well-nourished  No distress  Overweight  Eyes: EOM are normal    Cardiovascular: Normal rate, regular rhythm and normal heart sounds  Pulmonary/Chest: Effort normal and breath sounds normal  No stridor  No respiratory distress  She has no wheezes  She has no rales  Musculoskeletal: Normal range of motion  She exhibits no edema, tenderness or deformity  Neurological: She is alert and oriented to person, place, and time  No cranial nerve deficit  Coordination normal    Skin: She is not diaphoretic  Psychiatric: She has a normal mood and affect   Her behavior is normal  Judgment and thought content normal    Nursing note and vitals reviewed

## 2020-03-17 ENCOUNTER — TELEPHONE (OUTPATIENT)
Dept: NEUROLOGY | Facility: CLINIC | Age: 55
End: 2020-03-17

## 2020-03-17 NOTE — TELEPHONE ENCOUNTER
Patient states she is taking Trintellix for depression  She is aware that she is supposed to avoid SNRIs and SSRIs  She states Trintellix is now being classified as one of these medications, I was unable to confirm this  She is asking if she should switch medications  If advised to switch medications, she is asking if Dr Aravind Keith would be able to discuss directly with her PCP to avoid being stuck in the middle       [phone]  Okay to leave detailed message

## 2020-03-21 DIAGNOSIS — I10 ESSENTIAL HYPERTENSION: Chronic | ICD-10-CM

## 2020-03-23 RX ORDER — TRIAMTERENE AND HYDROCHLOROTHIAZIDE 37.5; 25 MG/1; MG/1
CAPSULE ORAL
Qty: 90 CAPSULE | Refills: 3 | Status: SHIPPED | OUTPATIENT
Start: 2020-03-23 | End: 2021-05-03

## 2020-03-23 NOTE — TELEPHONE ENCOUNTER
Rx  Was done for Trintellix due to patient claiming neurologist informed that this is safer for depression then other SNRI's or SSRI's  Aware this new medication falls in one of these categories but still obtained authorization for it due to neurologist recommendation  Not sure what neurologist would recommend other than this medication, I think patient should stay on this medication regardless, since I do not believe these meds   Contributed to her CVA in the past    Please verify with neurologist   Gabby Martinez

## 2020-03-23 NOTE — TELEPHONE ENCOUNTER
Before I call the patient back, Pt previously asked if Dr Miah Cohen could discuss antidepressant recommendations with her PCP, Lucy Capellan PA-C if recommended to switch  She wanted to avoid being caught in the middle of the discussion as she states this happened previously  Avani Pickett- do you have any recommendations for the patient's antidepressant that Dr Miah Cohen could assist with?

## 2020-05-05 ENCOUNTER — TELEPHONE (OUTPATIENT)
Dept: ENDOCRINOLOGY | Facility: HOSPITAL | Age: 55
End: 2020-05-05

## 2020-05-08 ENCOUNTER — OFFICE VISIT (OUTPATIENT)
Dept: FAMILY MEDICINE CLINIC | Facility: HOSPITAL | Age: 55
End: 2020-05-08
Payer: COMMERCIAL

## 2020-05-08 VITALS
TEMPERATURE: 98.1 F | HEART RATE: 61 BPM | WEIGHT: 202.8 LBS | BODY MASS INDEX: 32.59 KG/M2 | OXYGEN SATURATION: 97 % | SYSTOLIC BLOOD PRESSURE: 120 MMHG | HEIGHT: 66 IN | DIASTOLIC BLOOD PRESSURE: 80 MMHG

## 2020-05-08 DIAGNOSIS — Z98.84 HISTORY OF GASTRIC BYPASS: Chronic | ICD-10-CM

## 2020-05-08 DIAGNOSIS — F33.9 EPISODE OF RECURRENT MAJOR DEPRESSIVE DISORDER, UNSPECIFIED DEPRESSION EPISODE SEVERITY (HCC): Primary | ICD-10-CM

## 2020-05-08 DIAGNOSIS — I10 ESSENTIAL HYPERTENSION: Chronic | ICD-10-CM

## 2020-05-08 DIAGNOSIS — G25.0 TREMOR, ESSENTIAL: ICD-10-CM

## 2020-05-08 DIAGNOSIS — F33.41 RECURRENT MAJOR DEPRESSIVE DISORDER, IN PARTIAL REMISSION (HCC): ICD-10-CM

## 2020-05-08 PROCEDURE — 3074F SYST BP LT 130 MM HG: CPT | Performed by: PHYSICIAN ASSISTANT

## 2020-05-08 PROCEDURE — 3008F BODY MASS INDEX DOCD: CPT | Performed by: PHYSICIAN ASSISTANT

## 2020-05-08 PROCEDURE — 99214 OFFICE O/P EST MOD 30 MIN: CPT | Performed by: PHYSICIAN ASSISTANT

## 2020-05-08 PROCEDURE — 1036F TOBACCO NON-USER: CPT | Performed by: PHYSICIAN ASSISTANT

## 2020-05-08 PROCEDURE — 3079F DIAST BP 80-89 MM HG: CPT | Performed by: PHYSICIAN ASSISTANT

## 2020-05-08 RX ORDER — CHOLECALCIFEROL (VITAMIN D3) 125 MCG
1 CAPSULE ORAL DAILY
COMMUNITY

## 2020-05-08 RX ORDER — SERTRALINE HYDROCHLORIDE 25 MG/1
TABLET, FILM COATED ORAL
Qty: 30 TABLET | Refills: 5 | Status: SHIPPED | OUTPATIENT
Start: 2020-05-08 | End: 2020-05-18 | Stop reason: SINTOL

## 2020-05-18 ENCOUNTER — TELEPHONE (OUTPATIENT)
Dept: FAMILY MEDICINE CLINIC | Facility: HOSPITAL | Age: 55
End: 2020-05-18

## 2020-05-18 DIAGNOSIS — F33.41 RECURRENT MAJOR DEPRESSIVE DISORDER, IN PARTIAL REMISSION (HCC): Primary | ICD-10-CM

## 2020-05-18 RX ORDER — AMITRIPTYLINE HYDROCHLORIDE 10 MG/1
10 TABLET, FILM COATED ORAL
Qty: 30 TABLET | Refills: 3 | Status: SHIPPED | OUTPATIENT
Start: 2020-05-18 | End: 2020-09-10

## 2020-06-05 ENCOUNTER — OFFICE VISIT (OUTPATIENT)
Dept: NEUROLOGY | Facility: CLINIC | Age: 55
End: 2020-06-05
Payer: COMMERCIAL

## 2020-06-05 VITALS
DIASTOLIC BLOOD PRESSURE: 69 MMHG | TEMPERATURE: 98.6 F | SYSTOLIC BLOOD PRESSURE: 113 MMHG | BODY MASS INDEX: 32.58 KG/M2 | HEIGHT: 66 IN | WEIGHT: 202.7 LBS | HEART RATE: 70 BPM

## 2020-06-05 DIAGNOSIS — I10 ESSENTIAL HYPERTENSION: Chronic | ICD-10-CM

## 2020-06-05 DIAGNOSIS — Z98.84 HISTORY OF GASTRIC BYPASS: Chronic | ICD-10-CM

## 2020-06-05 DIAGNOSIS — I67.841 REVERSIBLE CEREBROVASCULAR VASOCONSTRICTION SYNDROME: Primary | ICD-10-CM

## 2020-06-05 DIAGNOSIS — G25.0 TREMOR, ESSENTIAL: ICD-10-CM

## 2020-06-05 DIAGNOSIS — G44.89 OTHER HEADACHE SYNDROME: ICD-10-CM

## 2020-06-05 PROCEDURE — 3008F BODY MASS INDEX DOCD: CPT | Performed by: PSYCHIATRY & NEUROLOGY

## 2020-06-05 PROCEDURE — 1036F TOBACCO NON-USER: CPT | Performed by: PSYCHIATRY & NEUROLOGY

## 2020-06-05 PROCEDURE — 3078F DIAST BP <80 MM HG: CPT | Performed by: PSYCHIATRY & NEUROLOGY

## 2020-06-05 PROCEDURE — 99214 OFFICE O/P EST MOD 30 MIN: CPT | Performed by: PSYCHIATRY & NEUROLOGY

## 2020-06-05 PROCEDURE — 3074F SYST BP LT 130 MM HG: CPT | Performed by: PSYCHIATRY & NEUROLOGY

## 2020-06-17 DIAGNOSIS — F41.9 ANXIETY: ICD-10-CM

## 2020-06-18 RX ORDER — LORAZEPAM 1 MG/1
TABLET ORAL
Qty: 90 TABLET | Refills: 3 | Status: SHIPPED | OUTPATIENT
Start: 2020-06-18 | End: 2020-12-07

## 2020-06-22 DIAGNOSIS — I10 ESSENTIAL HYPERTENSION: ICD-10-CM

## 2020-06-22 RX ORDER — LISINOPRIL 10 MG/1
TABLET ORAL
Qty: 90 TABLET | Refills: 1 | Status: SHIPPED | OUTPATIENT
Start: 2020-06-22 | End: 2020-12-22

## 2020-08-06 DIAGNOSIS — Z98.84 HISTORY OF GASTRIC BYPASS: Chronic | ICD-10-CM

## 2020-08-06 DIAGNOSIS — E66.09 OBESITY DUE TO EXCESS CALORIES WITHOUT SERIOUS COMORBIDITY, UNSPECIFIED CLASSIFICATION: Primary | Chronic | ICD-10-CM

## 2020-08-06 DIAGNOSIS — G25.0 TREMOR, ESSENTIAL: ICD-10-CM

## 2020-08-06 DIAGNOSIS — E16.1 REACTIVE HYPOGLYCEMIA: ICD-10-CM

## 2020-08-06 DIAGNOSIS — R79.89 LOW VITAMIN D LEVEL: ICD-10-CM

## 2020-08-06 DIAGNOSIS — R73.09: ICD-10-CM

## 2020-08-06 DIAGNOSIS — I10 ESSENTIAL HYPERTENSION: ICD-10-CM

## 2020-08-18 ENCOUNTER — TELEPHONE (OUTPATIENT)
Dept: FAMILY MEDICINE CLINIC | Facility: HOSPITAL | Age: 55
End: 2020-08-18

## 2020-08-25 LAB
25(OH)D3 SERPL-MCNC: 50 NG/ML (ref 30–100)
ALBUMIN SERPL-MCNC: 3.8 G/DL (ref 3.6–5.1)
ALBUMIN/GLOB SERPL: 1.7 (CALC) (ref 1–2.5)
ALP SERPL-CCNC: 40 U/L (ref 37–153)
ALT SERPL-CCNC: 13 U/L (ref 6–29)
AST SERPL-CCNC: 21 U/L (ref 10–35)
BASOPHILS # BLD AUTO: 10 CELLS/UL (ref 0–200)
BASOPHILS NFR BLD AUTO: 0.4 %
BILIRUB SERPL-MCNC: 0.4 MG/DL (ref 0.2–1.2)
BUN SERPL-MCNC: 8 MG/DL (ref 7–25)
BUN/CREAT SERPL: ABNORMAL (CALC) (ref 6–22)
CALCIUM SERPL-MCNC: 9 MG/DL (ref 8.6–10.4)
CHLORIDE SERPL-SCNC: 100 MMOL/L (ref 98–110)
CHOLEST SERPL-MCNC: 146 MG/DL
CHOLEST/HDLC SERPL: 2.7 (CALC)
CO2 SERPL-SCNC: 31 MMOL/L (ref 20–32)
CREAT SERPL-MCNC: 0.71 MG/DL (ref 0.5–1.05)
EOSINOPHIL # BLD AUTO: 10 CELLS/UL (ref 15–500)
EOSINOPHIL NFR BLD AUTO: 0.4 %
ERYTHROCYTE [DISTWIDTH] IN BLOOD BY AUTOMATED COUNT: 12.1 % (ref 11–15)
GLOBULIN SER CALC-MCNC: 2.2 G/DL (CALC) (ref 1.9–3.7)
GLUCOSE SERPL-MCNC: 79 MG/DL (ref 65–99)
HBA1C MFR BLD: 4.6 % OF TOTAL HGB
HCT VFR BLD AUTO: 41.1 % (ref 35–45)
HDLC SERPL-MCNC: 55 MG/DL
HGB BLD-MCNC: 13.3 G/DL (ref 11.7–15.5)
IRON SERPL-MCNC: 109 MCG/DL (ref 45–160)
LDLC SERPL CALC-MCNC: 77 MG/DL (CALC)
LYMPHOCYTES # BLD AUTO: 1128 CELLS/UL (ref 850–3900)
LYMPHOCYTES NFR BLD AUTO: 45.1 %
MAGNESIUM SERPL-MCNC: 1.8 MG/DL (ref 1.5–2.5)
MCH RBC QN AUTO: 29.6 PG (ref 27–33)
MCHC RBC AUTO-ENTMCNC: 32.4 G/DL (ref 32–36)
MCV RBC AUTO: 91.3 FL (ref 80–100)
MONOCYTES # BLD AUTO: 258 CELLS/UL (ref 200–950)
MONOCYTES NFR BLD AUTO: 10.3 %
NEUTROPHILS # BLD AUTO: 1095 CELLS/UL (ref 1500–7800)
NEUTROPHILS NFR BLD AUTO: 43.8 %
NONHDLC SERPL-MCNC: 91 MG/DL (CALC)
PLATELET # BLD AUTO: 169 THOUSAND/UL (ref 140–400)
PMV BLD REES-ECKER: 10.1 FL (ref 7.5–12.5)
POTASSIUM SERPL-SCNC: 3.8 MMOL/L (ref 3.5–5.3)
PROT SERPL-MCNC: 6 G/DL (ref 6.1–8.1)
RBC # BLD AUTO: 4.5 MILLION/UL (ref 3.8–5.1)
SL AMB EGFR AFRICAN AMERICAN: 113 ML/MIN/1.73M2
SL AMB EGFR NON AFRICAN AMERICAN: 97 ML/MIN/1.73M2
SODIUM SERPL-SCNC: 138 MMOL/L (ref 135–146)
TRIGL SERPL-MCNC: 65 MG/DL
TSH SERPL-ACNC: 1.74 MIU/L
VIT B12 SERPL-MCNC: 140 PG/ML (ref 200–1100)
WBC # BLD AUTO: 2.5 THOUSAND/UL (ref 3.8–10.8)

## 2020-08-31 ENCOUNTER — TELEPHONE (OUTPATIENT)
Dept: FAMILY MEDICINE CLINIC | Facility: HOSPITAL | Age: 55
End: 2020-08-31

## 2020-09-02 NOTE — TELEPHONE ENCOUNTER
Discussed results with patient, recommend vitamin B 12 injection, monthly, and possible hematology apt

## 2020-09-03 ENCOUNTER — CLINICAL SUPPORT (OUTPATIENT)
Dept: FAMILY MEDICINE CLINIC | Facility: HOSPITAL | Age: 55
End: 2020-09-03
Payer: COMMERCIAL

## 2020-09-03 DIAGNOSIS — Z98.84 HISTORY OF GASTRIC BYPASS: Primary | Chronic | ICD-10-CM

## 2020-09-03 DIAGNOSIS — E53.8 VITAMIN B12 DEFICIENCY: ICD-10-CM

## 2020-09-03 PROCEDURE — 96372 THER/PROPH/DIAG INJ SC/IM: CPT

## 2020-09-03 RX ORDER — CYANOCOBALAMIN 1000 UG/ML
1000 INJECTION INTRAMUSCULAR; SUBCUTANEOUS
Status: CANCELLED | OUTPATIENT
Start: 2020-09-03

## 2020-09-03 RX ORDER — CYANOCOBALAMIN 1000 UG/ML
1000 INJECTION INTRAMUSCULAR; SUBCUTANEOUS ONCE
Status: COMPLETED | OUTPATIENT
Start: 2020-09-03 | End: 2020-09-03

## 2020-09-03 RX ADMIN — CYANOCOBALAMIN 1000 MCG: 1000 INJECTION INTRAMUSCULAR; SUBCUTANEOUS at 14:52

## 2020-09-05 ENCOUNTER — NURSE TRIAGE (OUTPATIENT)
Dept: OTHER | Facility: OTHER | Age: 55
End: 2020-09-05

## 2020-09-05 DIAGNOSIS — N39.0 URINARY TRACT INFECTION WITHOUT HEMATURIA, SITE UNSPECIFIED: Primary | ICD-10-CM

## 2020-09-05 RX ORDER — CIPROFLOXACIN 250 MG/1
250 TABLET, FILM COATED ORAL EVERY 12 HOURS SCHEDULED
Qty: 14 TABLET | Refills: 0 | Status: SHIPPED | OUTPATIENT
Start: 2020-09-05 | End: 2020-09-12

## 2020-09-05 NOTE — TELEPHONE ENCOUNTER
Reason for Disposition   Age > 48 years    Additional Information   [1] Discomfort (pain, burning or stinging) when passing urine AND [2] female    Answer Assessment - Initial Assessment Questions  1  SYMPTOM: "What's the main symptom you're concerned about?" (e g , frequency, incontinence)      BURNING  FREQUENCY WHEN I URINATE I  WOULD LIKE AN ANTIBIOTIC CALLED IN  2  ONSET: "When did the    start?"      TODAY  3  PAIN: "Is there any pain?" If so, ask: "How bad is it?" (Scale: 1-10; mild, moderate, severe)      YES 5  4  CAUSE: "What do you think is causing the symptoms?"     UTI  5  OTHER SYMPTOMS: "Do you have any other symptoms?" (e g , fever, flank pain, blood in urine, pain with urination)    PAIN WITH URINATION  6   PREGNANCY: "Is there any chance you are pregnant?" "When was your last menstrual period?"      NA    Protocols used: URINATION PAIN - FEMALE-ADULT-, [facility]

## 2020-09-10 DIAGNOSIS — F33.41 RECURRENT MAJOR DEPRESSIVE DISORDER, IN PARTIAL REMISSION (HCC): ICD-10-CM

## 2020-09-10 RX ORDER — AMITRIPTYLINE HYDROCHLORIDE 10 MG/1
TABLET, FILM COATED ORAL
Qty: 30 TABLET | Refills: 3 | Status: SHIPPED | OUTPATIENT
Start: 2020-09-10 | End: 2020-09-23 | Stop reason: SDUPTHER

## 2020-09-17 ENCOUNTER — CLINICAL SUPPORT (OUTPATIENT)
Dept: FAMILY MEDICINE CLINIC | Facility: HOSPITAL | Age: 55
End: 2020-09-17
Payer: COMMERCIAL

## 2020-09-17 DIAGNOSIS — E53.8 B12 DEFICIENCY: Primary | ICD-10-CM

## 2020-09-17 PROCEDURE — 96372 THER/PROPH/DIAG INJ SC/IM: CPT

## 2020-09-17 RX ORDER — CYANOCOBALAMIN 1000 UG/ML
1000 INJECTION INTRAMUSCULAR; SUBCUTANEOUS
Status: CANCELLED | OUTPATIENT
Start: 2020-09-17

## 2020-09-17 RX ORDER — CYANOCOBALAMIN 1000 UG/ML
1000 INJECTION INTRAMUSCULAR; SUBCUTANEOUS ONCE
Status: COMPLETED | OUTPATIENT
Start: 2020-09-17 | End: 2020-09-17

## 2020-09-17 RX ADMIN — CYANOCOBALAMIN 1000 MCG: 1000 INJECTION INTRAMUSCULAR; SUBCUTANEOUS at 11:29

## 2020-09-23 ENCOUNTER — OFFICE VISIT (OUTPATIENT)
Dept: FAMILY MEDICINE CLINIC | Facility: HOSPITAL | Age: 55
End: 2020-09-23
Payer: COMMERCIAL

## 2020-09-23 VITALS
BODY MASS INDEX: 32.17 KG/M2 | OXYGEN SATURATION: 99 % | HEART RATE: 68 BPM | TEMPERATURE: 98.9 F | DIASTOLIC BLOOD PRESSURE: 66 MMHG | SYSTOLIC BLOOD PRESSURE: 106 MMHG | WEIGHT: 200.2 LBS | HEIGHT: 66 IN

## 2020-09-23 DIAGNOSIS — E53.8 LOW VITAMIN B12 LEVEL: ICD-10-CM

## 2020-09-23 DIAGNOSIS — F33.41 RECURRENT MAJOR DEPRESSIVE DISORDER, IN PARTIAL REMISSION (HCC): ICD-10-CM

## 2020-09-23 DIAGNOSIS — D72.819 LEUKOPENIA, UNSPECIFIED TYPE: Primary | ICD-10-CM

## 2020-09-23 PROCEDURE — 1036F TOBACCO NON-USER: CPT | Performed by: PHYSICIAN ASSISTANT

## 2020-09-23 PROCEDURE — 3725F SCREEN DEPRESSION PERFORMED: CPT | Performed by: PHYSICIAN ASSISTANT

## 2020-09-23 PROCEDURE — 99214 OFFICE O/P EST MOD 30 MIN: CPT | Performed by: PHYSICIAN ASSISTANT

## 2020-09-23 PROCEDURE — 3078F DIAST BP <80 MM HG: CPT | Performed by: PHYSICIAN ASSISTANT

## 2020-09-23 PROCEDURE — 3074F SYST BP LT 130 MM HG: CPT | Performed by: PHYSICIAN ASSISTANT

## 2020-09-23 RX ORDER — AMITRIPTYLINE HYDROCHLORIDE 25 MG/1
25 TABLET, FILM COATED ORAL
Qty: 30 TABLET | Refills: 2 | Status: SHIPPED | OUTPATIENT
Start: 2020-09-23 | End: 2020-12-03

## 2020-09-23 NOTE — PROGRESS NOTES
Assessment/Plan:     Diagnoses and all orders for this visit:    Leukopenia, unspecified type  -     Vitamin B12  -     CBC and differential    Recurrent major depressive disorder, in partial remission (HCC)  -     amitriptyline (ELAVIL) 25 mg tablet; Take 1 tablet (25 mg total) by mouth daily at bedtime    Low vitamin B12 level  -     Vitamin B12    -     CBC and differential        Subjective:      Patient ID: Whitney Bull is a 49 y o  female  Presents for follow up  Still feeling depressed, requesting increase in antidepressant  Trying to eat more sources of protein, ex: legumes, fish, etc   Getting 3rd, vitamin B 12 injection tomorrow  Review of Systems   Constitutional: Positive for fatigue  Negative for chills, diaphoresis and fever  HENT: Positive for congestion and rhinorrhea  Negative for ear pain and sore throat  Has cold sx  Respiratory: Positive for cough  Negative for chest tightness and shortness of breath  Gastrointestinal: Negative for abdominal pain, constipation, diarrhea, nausea and vomiting  Psychiatric/Behavioral: Positive for decreased concentration and dysphoric mood  Negative for self-injury, sleep disturbance and suicidal ideas  The patient is nervous/anxious  Occasionally has some "fuzzy feeling"  Anxiety under good control  Objective:      /66   Pulse 68   Temp 98 9 °F (37 2 °C) (Tympanic)   Ht 5' 6" (1 676 m)   Wt 90 8 kg (200 lb 3 2 oz)   LMP 12/21/2016 (LMP Unknown)   SpO2 99%   BMI 32 31 kg/m²          Physical Exam  Vitals signs and nursing note reviewed  Constitutional:       General: She is not in acute distress  Appearance: Normal appearance  She is not ill-appearing, toxic-appearing or diaphoretic  Neck:      Musculoskeletal: Neck supple  No neck rigidity  Cardiovascular:      Rate and Rhythm: Normal rate and regular rhythm  Pulses: Normal pulses  Heart sounds: Normal heart sounds   No murmur  No friction rub  No gallop  Pulmonary:      Effort: Pulmonary effort is normal  No respiratory distress  Breath sounds: Normal breath sounds  No stridor  No wheezing, rhonchi or rales  Chest:      Chest wall: No tenderness  Neurological:      General: No focal deficit present  Mental Status: She is alert and oriented to person, place, and time  Cranial Nerves: No cranial nerve deficit  Sensory: No sensory deficit  Motor: No weakness  Coordination: Coordination normal       Gait: Gait normal       Deep Tendon Reflexes: Reflexes normal    Psychiatric:         Behavior: Behavior normal          Thought Content: Thought content normal          Judgment: Judgment normal       Comments: Low mood

## 2020-09-24 ENCOUNTER — CLINICAL SUPPORT (OUTPATIENT)
Dept: FAMILY MEDICINE CLINIC | Facility: HOSPITAL | Age: 55
End: 2020-09-24
Payer: COMMERCIAL

## 2020-09-24 DIAGNOSIS — E53.8 B12 DEFICIENCY: Primary | ICD-10-CM

## 2020-09-24 PROCEDURE — 96372 THER/PROPH/DIAG INJ SC/IM: CPT

## 2020-09-24 RX ORDER — CYANOCOBALAMIN 1000 UG/ML
1000 INJECTION INTRAMUSCULAR; SUBCUTANEOUS
Status: SHIPPED | OUTPATIENT
Start: 2020-09-24

## 2020-09-24 RX ADMIN — CYANOCOBALAMIN 1000 MCG: 1000 INJECTION INTRAMUSCULAR; SUBCUTANEOUS at 09:04

## 2020-10-01 ENCOUNTER — CLINICAL SUPPORT (OUTPATIENT)
Dept: FAMILY MEDICINE CLINIC | Facility: HOSPITAL | Age: 55
End: 2020-10-01
Payer: COMMERCIAL

## 2020-10-01 DIAGNOSIS — E53.8 B12 DEFICIENCY: Primary | ICD-10-CM

## 2020-10-01 PROCEDURE — 96372 THER/PROPH/DIAG INJ SC/IM: CPT

## 2020-10-01 RX ADMIN — CYANOCOBALAMIN 1000 MCG: 1000 INJECTION INTRAMUSCULAR; SUBCUTANEOUS at 08:53

## 2020-10-14 DIAGNOSIS — R73.09: ICD-10-CM

## 2020-10-15 RX ORDER — BLOOD-GLUCOSE METER
KIT MISCELLANEOUS
Qty: 100 EACH | Refills: 5 | Status: SHIPPED | OUTPATIENT
Start: 2020-10-15 | End: 2021-10-29

## 2020-10-15 RX ORDER — LANCETS 28 GAUGE
EACH MISCELLANEOUS
Qty: 100 EACH | Refills: 5 | Status: SHIPPED | OUTPATIENT
Start: 2020-10-15

## 2020-10-15 RX ORDER — BLOOD-GLUCOSE METER
KIT MISCELLANEOUS
Qty: 1 EACH | Refills: 0 | Status: SHIPPED | OUTPATIENT
Start: 2020-10-15

## 2020-10-22 NOTE — BRIEF OP NOTE (RAD/CATH)
IR CEREBRAL ANGIOGRAPHY  Procedure Note    PATIENT NAME: Leigh Duncan  : 1965  MRN: [id number]     Pre-op Diagnosis:   1  Intractable headache    2  SAH (subarachnoid hemorrhage) ([facility])      Post-op Diagnosis:   1  Intractable headache    2  SAH (subarachnoid hemorrhage) [facility])        Surgeon:   Solomon Boyer MD  Assistants:     No qualified resident was available, Resident is only observing    Estimated Blood Loss: 5 cc  Findings: Minimal multifocal narrowing  Unclear etiology if atherosclerotic vs vasculopathy such as RCVS/vasculitis  Full report to follow  Right femoral sheath removed  Closure device failed to deploy  Manual compression performed for hemostasis      Specimens: none    Complications:  none    Anesthesia: BELKYS Boyer MD     Date: 2018  Time: 3:11 PM
175

## 2020-10-28 LAB
BASOPHILS # BLD AUTO: 11 CELLS/UL (ref 0–200)
BASOPHILS NFR BLD AUTO: 0.4 %
EOSINOPHIL # BLD AUTO: 11 CELLS/UL (ref 15–500)
EOSINOPHIL NFR BLD AUTO: 0.4 %
ERYTHROCYTE [DISTWIDTH] IN BLOOD BY AUTOMATED COUNT: 11.8 % (ref 11–15)
HCT VFR BLD AUTO: 41.9 % (ref 35–45)
HGB BLD-MCNC: 13.9 G/DL (ref 11.7–15.5)
LYMPHOCYTES # BLD AUTO: 1078 CELLS/UL (ref 850–3900)
LYMPHOCYTES NFR BLD AUTO: 38.5 %
MCH RBC QN AUTO: 29.8 PG (ref 27–33)
MCHC RBC AUTO-ENTMCNC: 33.2 G/DL (ref 32–36)
MCV RBC AUTO: 89.9 FL (ref 80–100)
MONOCYTES # BLD AUTO: 218 CELLS/UL (ref 200–950)
MONOCYTES NFR BLD AUTO: 7.8 %
NEUTROPHILS # BLD AUTO: 1481 CELLS/UL (ref 1500–7800)
NEUTROPHILS NFR BLD AUTO: 52.9 %
PLATELET # BLD AUTO: 177 THOUSAND/UL (ref 140–400)
PMV BLD REES-ECKER: 10.6 FL (ref 7.5–12.5)
RBC # BLD AUTO: 4.66 MILLION/UL (ref 3.8–5.1)
VIT B12 SERPL-MCNC: 250 PG/ML (ref 200–1100)
WBC # BLD AUTO: 2.8 THOUSAND/UL (ref 3.8–10.8)

## 2020-11-05 ENCOUNTER — OFFICE VISIT (OUTPATIENT)
Dept: FAMILY MEDICINE CLINIC | Facility: HOSPITAL | Age: 55
End: 2020-11-05
Payer: COMMERCIAL

## 2020-11-05 VITALS
DIASTOLIC BLOOD PRESSURE: 78 MMHG | BODY MASS INDEX: 32.43 KG/M2 | HEART RATE: 68 BPM | SYSTOLIC BLOOD PRESSURE: 110 MMHG | HEIGHT: 66 IN | WEIGHT: 201.8 LBS | TEMPERATURE: 96.8 F | OXYGEN SATURATION: 98 %

## 2020-11-05 DIAGNOSIS — D72.819 LEUKOPENIA, UNSPECIFIED TYPE: ICD-10-CM

## 2020-11-05 DIAGNOSIS — Z98.84 HISTORY OF GASTRIC BYPASS: Primary | Chronic | ICD-10-CM

## 2020-11-05 DIAGNOSIS — E53.8 VITAMIN B12 DEFICIENCY: ICD-10-CM

## 2020-11-05 DIAGNOSIS — E53.8 B12 DEFICIENCY: ICD-10-CM

## 2020-11-05 DIAGNOSIS — R73.09: ICD-10-CM

## 2020-11-05 PROCEDURE — 3078F DIAST BP <80 MM HG: CPT | Performed by: PHYSICIAN ASSISTANT

## 2020-11-05 PROCEDURE — 3008F BODY MASS INDEX DOCD: CPT | Performed by: PHYSICIAN ASSISTANT

## 2020-11-05 PROCEDURE — 99214 OFFICE O/P EST MOD 30 MIN: CPT | Performed by: PHYSICIAN ASSISTANT

## 2020-11-05 PROCEDURE — 3074F SYST BP LT 130 MM HG: CPT | Performed by: PHYSICIAN ASSISTANT

## 2020-11-05 PROCEDURE — 1036F TOBACCO NON-USER: CPT | Performed by: PHYSICIAN ASSISTANT

## 2020-11-05 RX ADMIN — CYANOCOBALAMIN 1000 MCG: 1000 INJECTION INTRAMUSCULAR; SUBCUTANEOUS at 11:43

## 2020-11-25 LAB
ALBUMIN SERPL-MCNC: 4.2 G/DL (ref 3.6–5.1)
ALBUMIN/GLOB SERPL: 1.9 (CALC) (ref 1–2.5)
ALP SERPL-CCNC: 51 U/L (ref 37–153)
ALT SERPL-CCNC: 21 U/L (ref 6–29)
AST SERPL-CCNC: 25 U/L (ref 10–35)
BASOPHILS # BLD AUTO: 19 CELLS/UL (ref 0–200)
BASOPHILS NFR BLD AUTO: 0.6 %
BILIRUB SERPL-MCNC: 0.5 MG/DL (ref 0.2–1.2)
BUN SERPL-MCNC: 9 MG/DL (ref 7–25)
BUN/CREAT SERPL: ABNORMAL (CALC) (ref 6–22)
CALCIUM SERPL-MCNC: 9.1 MG/DL (ref 8.6–10.4)
CHLORIDE SERPL-SCNC: 101 MMOL/L (ref 98–110)
CO2 SERPL-SCNC: 35 MMOL/L (ref 20–32)
CREAT SERPL-MCNC: 0.63 MG/DL (ref 0.5–1.05)
EOSINOPHIL # BLD AUTO: 0 CELLS/UL (ref 15–500)
EOSINOPHIL NFR BLD AUTO: 0 %
ERYTHROCYTE [DISTWIDTH] IN BLOOD BY AUTOMATED COUNT: 12.1 % (ref 11–15)
GLOBULIN SER CALC-MCNC: 2.2 G/DL (CALC) (ref 1.9–3.7)
GLUCOSE SERPL-MCNC: 80 MG/DL (ref 65–99)
HCT VFR BLD AUTO: 41.5 % (ref 35–45)
HGB BLD-MCNC: 13.7 G/DL (ref 11.7–15.5)
LYMPHOCYTES # BLD AUTO: 1270 CELLS/UL (ref 850–3900)
LYMPHOCYTES NFR BLD AUTO: 39.7 %
MCH RBC QN AUTO: 29.8 PG (ref 27–33)
MCHC RBC AUTO-ENTMCNC: 33 G/DL (ref 32–36)
MCV RBC AUTO: 90.2 FL (ref 80–100)
MONOCYTES # BLD AUTO: 304 CELLS/UL (ref 200–950)
MONOCYTES NFR BLD AUTO: 9.5 %
NEUTROPHILS # BLD AUTO: 1606 CELLS/UL (ref 1500–7800)
NEUTROPHILS NFR BLD AUTO: 50.2 %
PLATELET # BLD AUTO: 190 THOUSAND/UL (ref 140–400)
PMV BLD REES-ECKER: 9.9 FL (ref 7.5–12.5)
POTASSIUM SERPL-SCNC: 4.2 MMOL/L (ref 3.5–5.3)
PROT SERPL-MCNC: 6.4 G/DL (ref 6.1–8.1)
RBC # BLD AUTO: 4.6 MILLION/UL (ref 3.8–5.1)
SL AMB EGFR AFRICAN AMERICAN: 118 ML/MIN/1.73M2
SL AMB EGFR NON AFRICAN AMERICAN: 102 ML/MIN/1.73M2
SODIUM SERPL-SCNC: 141 MMOL/L (ref 135–146)
VIT B12 SERPL-MCNC: 305 PG/ML (ref 200–1100)
WBC # BLD AUTO: 3.2 THOUSAND/UL (ref 3.8–10.8)

## 2020-12-03 ENCOUNTER — OFFICE VISIT (OUTPATIENT)
Dept: FAMILY MEDICINE CLINIC | Facility: HOSPITAL | Age: 55
End: 2020-12-03
Payer: COMMERCIAL

## 2020-12-03 VITALS
SYSTOLIC BLOOD PRESSURE: 100 MMHG | RESPIRATION RATE: 16 BRPM | BODY MASS INDEX: 32.14 KG/M2 | WEIGHT: 200 LBS | DIASTOLIC BLOOD PRESSURE: 68 MMHG | HEIGHT: 66 IN | HEART RATE: 58 BPM

## 2020-12-03 DIAGNOSIS — E53.8 VITAMIN B12 DEFICIENCY: ICD-10-CM

## 2020-12-03 DIAGNOSIS — E53.8 B12 DEFICIENCY: ICD-10-CM

## 2020-12-03 DIAGNOSIS — Z12.31 ENCOUNTER FOR SCREENING MAMMOGRAM FOR BREAST CANCER: ICD-10-CM

## 2020-12-03 DIAGNOSIS — F33.41 RECURRENT MAJOR DEPRESSIVE DISORDER, IN PARTIAL REMISSION (HCC): Primary | ICD-10-CM

## 2020-12-03 PROCEDURE — 1036F TOBACCO NON-USER: CPT | Performed by: PHYSICIAN ASSISTANT

## 2020-12-03 PROCEDURE — 3008F BODY MASS INDEX DOCD: CPT | Performed by: PHYSICIAN ASSISTANT

## 2020-12-03 PROCEDURE — 3074F SYST BP LT 130 MM HG: CPT | Performed by: PHYSICIAN ASSISTANT

## 2020-12-03 PROCEDURE — 3725F SCREEN DEPRESSION PERFORMED: CPT | Performed by: PHYSICIAN ASSISTANT

## 2020-12-03 PROCEDURE — 99214 OFFICE O/P EST MOD 30 MIN: CPT | Performed by: PHYSICIAN ASSISTANT

## 2020-12-03 PROCEDURE — 3078F DIAST BP <80 MM HG: CPT | Performed by: PHYSICIAN ASSISTANT

## 2020-12-03 RX ORDER — AMITRIPTYLINE HYDROCHLORIDE 50 MG/1
50 TABLET, FILM COATED ORAL
Qty: 30 TABLET | Refills: 3 | Status: SHIPPED | OUTPATIENT
Start: 2020-12-03 | End: 2021-04-05

## 2020-12-03 RX ORDER — CYANOCOBALAMIN 1000 UG/ML
1000 INJECTION INTRAMUSCULAR; SUBCUTANEOUS
Qty: 2 ML | Refills: 4 | Status: SHIPPED | OUTPATIENT
Start: 2020-12-03 | End: 2021-02-21

## 2020-12-03 RX ORDER — CYANOCOBALAMIN 1000 UG/ML
1000 INJECTION INTRAMUSCULAR; SUBCUTANEOUS
Qty: 2 ML | Refills: 4 | Status: SHIPPED | OUTPATIENT
Start: 2020-12-03 | End: 2020-12-03 | Stop reason: SDUPTHER

## 2020-12-03 RX ADMIN — CYANOCOBALAMIN 1000 MCG: 1000 INJECTION INTRAMUSCULAR; SUBCUTANEOUS at 09:25

## 2020-12-06 DIAGNOSIS — F41.9 ANXIETY: ICD-10-CM

## 2020-12-07 RX ORDER — LORAZEPAM 1 MG/1
TABLET ORAL
Qty: 90 TABLET | Refills: 3 | Status: SHIPPED | OUTPATIENT
Start: 2020-12-07 | End: 2021-04-16

## 2020-12-08 DIAGNOSIS — E53.8 VITAMIN B12 DEFICIENCY: Primary | ICD-10-CM

## 2020-12-15 DIAGNOSIS — I10 ESSENTIAL HYPERTENSION: ICD-10-CM

## 2020-12-22 RX ORDER — LISINOPRIL 10 MG/1
TABLET ORAL
Qty: 30 TABLET | Refills: 5 | Status: SHIPPED | OUTPATIENT
Start: 2020-12-22 | End: 2021-06-23

## 2020-12-23 ENCOUNTER — OFFICE VISIT (OUTPATIENT)
Dept: FAMILY MEDICINE CLINIC | Facility: HOSPITAL | Age: 55
End: 2020-12-23
Payer: COMMERCIAL

## 2020-12-23 VITALS — DIASTOLIC BLOOD PRESSURE: 80 MMHG | WEIGHT: 205 LBS | SYSTOLIC BLOOD PRESSURE: 120 MMHG | BODY MASS INDEX: 33.09 KG/M2

## 2020-12-23 DIAGNOSIS — E66.09 OBESITY DUE TO EXCESS CALORIES WITHOUT SERIOUS COMORBIDITY, UNSPECIFIED CLASSIFICATION: Chronic | ICD-10-CM

## 2020-12-23 DIAGNOSIS — E53.8 VITAMIN B12 DEFICIENCY: ICD-10-CM

## 2020-12-23 DIAGNOSIS — D72.819 LEUKOPENIA, UNSPECIFIED TYPE: ICD-10-CM

## 2020-12-23 DIAGNOSIS — Z98.84 HISTORY OF GASTRIC BYPASS: Chronic | ICD-10-CM

## 2020-12-23 DIAGNOSIS — F33.41 RECURRENT MAJOR DEPRESSIVE DISORDER, IN PARTIAL REMISSION (HCC): ICD-10-CM

## 2020-12-23 DIAGNOSIS — I10 ESSENTIAL HYPERTENSION: Primary | Chronic | ICD-10-CM

## 2020-12-23 DIAGNOSIS — I67.841 REVERSIBLE CEREBROVASCULAR VASOCONSTRICTION SYNDROME: ICD-10-CM

## 2020-12-23 PROCEDURE — 3079F DIAST BP 80-89 MM HG: CPT | Performed by: PHYSICIAN ASSISTANT

## 2020-12-23 PROCEDURE — 99214 OFFICE O/P EST MOD 30 MIN: CPT | Performed by: PHYSICIAN ASSISTANT

## 2020-12-23 PROCEDURE — 3074F SYST BP LT 130 MM HG: CPT | Performed by: PHYSICIAN ASSISTANT

## 2020-12-23 PROCEDURE — 1036F TOBACCO NON-USER: CPT | Performed by: PHYSICIAN ASSISTANT

## 2020-12-23 RX ADMIN — CYANOCOBALAMIN 1000 MCG: 1000 INJECTION INTRAMUSCULAR; SUBCUTANEOUS at 09:28

## 2021-02-21 DIAGNOSIS — E53.8 VITAMIN B12 DEFICIENCY: ICD-10-CM

## 2021-02-21 RX ORDER — CYANOCOBALAMIN 1000 UG/ML
1000 INJECTION INTRAMUSCULAR; SUBCUTANEOUS
Qty: 6 ML | Refills: 1 | Status: SHIPPED | OUTPATIENT
Start: 2021-02-21 | End: 2021-07-02 | Stop reason: SDUPTHER

## 2021-03-03 LAB
ALBUMIN SERPL-MCNC: 4.2 G/DL (ref 3.6–5.1)
ALBUMIN/GLOB SERPL: 2.1 (CALC) (ref 1–2.5)
ALP SERPL-CCNC: 47 U/L (ref 37–153)
ALT SERPL-CCNC: 13 U/L (ref 6–29)
AST SERPL-CCNC: 20 U/L (ref 10–35)
BASOPHILS # BLD AUTO: 21 CELLS/UL (ref 0–200)
BASOPHILS NFR BLD AUTO: 0.8 %
BILIRUB SERPL-MCNC: 0.6 MG/DL (ref 0.2–1.2)
BUN SERPL-MCNC: 14 MG/DL (ref 7–25)
BUN/CREAT SERPL: ABNORMAL (CALC) (ref 6–22)
CALCIUM SERPL-MCNC: 9.2 MG/DL (ref 8.6–10.4)
CHLORIDE SERPL-SCNC: 101 MMOL/L (ref 98–110)
CO2 SERPL-SCNC: 34 MMOL/L (ref 20–32)
CREAT SERPL-MCNC: 0.69 MG/DL (ref 0.5–1.05)
EOSINOPHIL # BLD AUTO: 10 CELLS/UL (ref 15–500)
EOSINOPHIL NFR BLD AUTO: 0.4 %
ERYTHROCYTE [DISTWIDTH] IN BLOOD BY AUTOMATED COUNT: 12.6 % (ref 11–15)
GLOBULIN SER CALC-MCNC: 2 G/DL (CALC) (ref 1.9–3.7)
GLUCOSE SERPL-MCNC: 80 MG/DL (ref 65–99)
HCT VFR BLD AUTO: 40.8 % (ref 35–45)
HGB BLD-MCNC: 13.4 G/DL (ref 11.7–15.5)
LYMPHOCYTES # BLD AUTO: 1089 CELLS/UL (ref 850–3900)
LYMPHOCYTES NFR BLD AUTO: 41.9 %
MCH RBC QN AUTO: 29.8 PG (ref 27–33)
MCHC RBC AUTO-ENTMCNC: 32.8 G/DL (ref 32–36)
MCV RBC AUTO: 90.9 FL (ref 80–100)
MONOCYTES # BLD AUTO: 242 CELLS/UL (ref 200–950)
MONOCYTES NFR BLD AUTO: 9.3 %
NEUTROPHILS # BLD AUTO: 1238 CELLS/UL (ref 1500–7800)
NEUTROPHILS NFR BLD AUTO: 47.6 %
PLATELET # BLD AUTO: 158 THOUSAND/UL (ref 140–400)
PMV BLD REES-ECKER: 10.1 FL (ref 7.5–12.5)
POTASSIUM SERPL-SCNC: 4.3 MMOL/L (ref 3.5–5.3)
PROT SERPL-MCNC: 6.2 G/DL (ref 6.1–8.1)
RBC # BLD AUTO: 4.49 MILLION/UL (ref 3.8–5.1)
SL AMB EGFR AFRICAN AMERICAN: 114 ML/MIN/1.73M2
SL AMB EGFR NON AFRICAN AMERICAN: 99 ML/MIN/1.73M2
SODIUM SERPL-SCNC: 140 MMOL/L (ref 135–146)
VIT B12 SERPL-MCNC: 400 PG/ML (ref 200–1100)
WBC # BLD AUTO: 2.6 THOUSAND/UL (ref 3.8–10.8)

## 2021-03-11 ENCOUNTER — OFFICE VISIT (OUTPATIENT)
Dept: FAMILY MEDICINE CLINIC | Facility: HOSPITAL | Age: 56
End: 2021-03-11
Payer: COMMERCIAL

## 2021-03-11 VITALS
DIASTOLIC BLOOD PRESSURE: 68 MMHG | WEIGHT: 214.6 LBS | SYSTOLIC BLOOD PRESSURE: 122 MMHG | HEART RATE: 64 BPM | HEIGHT: 66 IN | OXYGEN SATURATION: 95 % | BODY MASS INDEX: 34.49 KG/M2

## 2021-03-11 DIAGNOSIS — D72.819 LEUKOPENIA, UNSPECIFIED TYPE: ICD-10-CM

## 2021-03-11 DIAGNOSIS — I67.841 REVERSIBLE CEREBROVASCULAR VASOCONSTRICTION SYNDROME: ICD-10-CM

## 2021-03-11 DIAGNOSIS — Z98.84 HISTORY OF GASTRIC BYPASS: Primary | Chronic | ICD-10-CM

## 2021-03-11 DIAGNOSIS — G44.89 OTHER HEADACHE SYNDROME: ICD-10-CM

## 2021-03-11 DIAGNOSIS — E66.09 OBESITY DUE TO EXCESS CALORIES WITHOUT SERIOUS COMORBIDITY, UNSPECIFIED CLASSIFICATION: Chronic | ICD-10-CM

## 2021-03-11 PROCEDURE — 99214 OFFICE O/P EST MOD 30 MIN: CPT | Performed by: PHYSICIAN ASSISTANT

## 2021-03-11 NOTE — PROGRESS NOTES
Assessment/Plan:       Problem List Items Addressed This Visit        Cardiovascular and Mediastinum    Reversible cerebrovascular vasoconstriction syndrome-  History of -       Relevant Orders    Ambulatory referral to Hematology / Oncology       Other    History of gastric bypass - Primary (Chronic)-   Suspects malabsorption syndrome    Relevant Orders    Ambulatory referral to Hematology / Oncology    Obesity due to excess calories (Chronic)-    Relevant Orders    Ambulatory referral to Hematology / Oncology          Other Visit Diagnoses     Leukopenia, unspecified type        Relevant Orders    Ambulatory referral to Hematology / Oncology        Discussed recent blood test- low WBC, referred to hematology  History of fluctuating WBC, suspect dietary causes, or   Malabsorption  Patient to increase protein in diet  Subjective:      Patient ID: Jason Gomez is a 48 y o  female  Presents for follow up, mood up and down  Son goes to [employer], going through mood issues, and low in vitamin D; was started on mood medication  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, has celiac dz , recommended celiac blood test work up   1/25/2021, job was eliminated, last day at work is 3/25/2021; has been at job for 20 years  Found another job about 18 days after getting notice  Has about 6 days off between jobs  Doing vegan diet, but does eat fish once a week  Admits to getting hypoglycemia, trying to modify diet  Review of Systems   Constitutional: Positive for diaphoresis and fatigue  Negative for chills and fever  Respiratory: Negative for cough, chest tightness and shortness of breath  Cardiovascular: Negative for chest pain, palpitations and leg swelling  Gastrointestinal: Negative for abdominal pain, constipation, diarrhea, nausea and vomiting  Endocrine: Negative for polydipsia, polyphagia and polyuria  Musculoskeletal: Positive for myalgias   Negative for arthralgias, back pain, neck pain and neck stiffness  Had some calf pain, possible strain  Neurological: Negative for dizziness, light-headedness, numbness and headaches  Psychiatric/Behavioral: Positive for dysphoric mood  The patient is nervous/anxious  Symptoms up and down, has medications  Objective:      /68   Pulse 64   Ht 5' 6" (1 676 m)   Wt 97 3 kg (214 lb 9 6 oz)   LMP 12/21/2016 (LMP Unknown)   SpO2 95%   BMI 34 64 kg/m²          Physical Exam  Vitals signs and nursing note reviewed  Constitutional:       General: She is not in acute distress  Appearance: She is obese  She is not ill-appearing, toxic-appearing or diaphoretic  HENT:      Head: Normocephalic and atraumatic  Cardiovascular:      Rate and Rhythm: Normal rate and regular rhythm  Pulses: Normal pulses  Heart sounds: Normal heart sounds  No murmur  No friction rub  No gallop  Pulmonary:      Effort: Pulmonary effort is normal  No respiratory distress  Breath sounds: Normal breath sounds  No stridor  No wheezing, rhonchi or rales  Chest:      Chest wall: No tenderness  Musculoskeletal: Normal range of motion  General: No swelling, tenderness, deformity or signs of injury  Right lower leg: No edema  Left lower leg: No edema  Neurological:      General: No focal deficit present  Mental Status: She is alert and oriented to person, place, and time  Cranial Nerves: No cranial nerve deficit  Sensory: No sensory deficit  Motor: No weakness  Coordination: Coordination normal    Psychiatric:         Behavior: Behavior normal          Thought Content: Thought content normal          Judgment: Judgment normal       Comments: Flat affect, low mood

## 2021-03-15 ENCOUNTER — TELEPHONE (OUTPATIENT)
Dept: HEMATOLOGY ONCOLOGY | Facility: CLINIC | Age: 56
End: 2021-03-15

## 2021-03-15 NOTE — TELEPHONE ENCOUNTER
New Patient Encounter    New Patient Intake Form   Patient Details:  Estelita Sauceda  1965  [id number]    Background Information:  [address] starts by opening a telephone encounter and gathering the following information   Who is calling to schedule? If not self, relationship to patient? self   Referring Provider Ai Slade   What is the diagnosis? Low WBC   Is this diagnosis confirmed? Yes   When was the diagnosis? 2019   Is there a confirmed diagnosis from a biopsy/tissue reviewed by pathology? Were outside slides requested? Is patient aware of diagnosis? No   Is there a personal history and what kind? No   Is there a family history and what kind? No   Reason for visit? History Of   Have you had any imaging or labs done? If so: when, where? yes  [facility]   Are records in EPIC? yes   If patient has a prior history of breast cancer were old records obtained? Was the patient told to bring a disk? Does the patient smoke or Vape? Yes   If yes, how many packs or cartridges per day? 5 cigs a week   Scheduling Information:   [facility]:  [facility]     Are there any dates/time the patient cannot be seen? Miscellaneous:    After completing the above information, please route to Financial Counselor and the appropriate Nurse Navigator for review

## 2021-03-29 ENCOUNTER — TELEPHONE (OUTPATIENT)
Dept: HEMATOLOGY ONCOLOGY | Facility: HOSPITAL | Age: 56
End: 2021-03-29

## 2021-03-29 ENCOUNTER — TELEPHONE (OUTPATIENT)
Dept: HEMATOLOGY ONCOLOGY | Facility: CLINIC | Age: 56
End: 2021-03-29

## 2021-03-31 ENCOUNTER — CONSULT (OUTPATIENT)
Dept: HEMATOLOGY ONCOLOGY | Facility: HOSPITAL | Age: 56
End: 2021-03-31
Payer: COMMERCIAL

## 2021-03-31 VITALS
RESPIRATION RATE: 16 BRPM | SYSTOLIC BLOOD PRESSURE: 138 MMHG | OXYGEN SATURATION: 98 % | TEMPERATURE: 98.1 F | DIASTOLIC BLOOD PRESSURE: 88 MMHG | HEART RATE: 72 BPM | WEIGHT: 214 LBS | BODY MASS INDEX: 34.39 KG/M2 | HEIGHT: 66 IN

## 2021-03-31 DIAGNOSIS — D47.1 MYELOPROLIFERATIVE DISEASE (HCC): ICD-10-CM

## 2021-03-31 DIAGNOSIS — E66.09 OBESITY DUE TO EXCESS CALORIES WITHOUT SERIOUS COMORBIDITY, UNSPECIFIED CLASSIFICATION: Chronic | ICD-10-CM

## 2021-03-31 DIAGNOSIS — Z98.84 HISTORY OF GASTRIC BYPASS: Chronic | ICD-10-CM

## 2021-03-31 DIAGNOSIS — I67.841 REVERSIBLE CEREBROVASCULAR VASOCONSTRICTION SYNDROME: ICD-10-CM

## 2021-03-31 DIAGNOSIS — D72.819 LEUKOPENIA, UNSPECIFIED TYPE: Primary | ICD-10-CM

## 2021-03-31 DIAGNOSIS — G44.89 OTHER HEADACHE SYNDROME: ICD-10-CM

## 2021-03-31 DIAGNOSIS — C91.Z0 LARGE GRANULAR LYMPHOCYTE DISORDER (HCC): ICD-10-CM

## 2021-03-31 PROCEDURE — 99205 OFFICE O/P NEW HI 60 MIN: CPT | Performed by: INTERNAL MEDICINE

## 2021-03-31 PROCEDURE — 3079F DIAST BP 80-89 MM HG: CPT | Performed by: INTERNAL MEDICINE

## 2021-03-31 PROCEDURE — 3008F BODY MASS INDEX DOCD: CPT | Performed by: INTERNAL MEDICINE

## 2021-03-31 PROCEDURE — 3075F SYST BP GE 130 - 139MM HG: CPT | Performed by: INTERNAL MEDICINE

## 2021-03-31 NOTE — PROGRESS NOTES
Oncology Outpatient Consult Note  Alexandria Chiu 48 y o  female MRN: @ Encounter: [id number]        Date:  3/31/2021        CC: Low white count with mild neutropenia and slightly low  Eosinophils since 2019      HPI:  Alexandria Chiu is seen for initial consultation 3/31/2021 regarding  An isolated low white count since 2019  Looking through the blood work all the way back to 2019 the patient's white count has fluctuated between 2 5-3 0 with a normal hemoglobin and normal platelet count  The patient's neutrophils have fluctuated between a 1095 and normal   The most recent 1 was 1238  Patient herself has had no issues with frequent infections or the need for antibiotics  She does have a history of a gastric bypass and has been iron deficient in the past and was seen years ago for Venofer  She currently gives herself a B12 shot once a month  Denies any nausea denies any vomiting denies any diarrhea  The rest of her 14 point review of systems today was negative  Denies any frequent infections  Test Results:    Imaging: No results found  Labs:   Lab Results   Component Value Date    WBC 2 6 (L) 03/02/2021    HGB 13 4 03/02/2021    HCT 40 8 03/02/2021    MCV 90 9 03/02/2021     03/02/2021     Lab Results   Component Value Date     06/18/2015    K 4 3 03/02/2021     03/02/2021    CO2 34 (H) 03/02/2021    ANIONGAP 8 06/18/2015    BUN 14 03/02/2021    CREATININE 0 69 03/02/2021    GLUCOSE 142 (H) 06/18/2015    GLUF 134 (H) 01/23/2018    CALCIUM 9 2 03/02/2021    AST 20 03/02/2021    ALT 13 03/02/2021    ALKPHOS 47 03/02/2021    EGFR 86 12/23/2018       Lab Results   Component Value Date    IRON 109 08/24/2020    TIBC 326 10/05/2018    FERRITIN 58 10/05/2018       Lab Results   Component Value Date    KYBRWHPD08 400 03/02/2021       ROS: As stated in history of present illness otherwise her 14 point review of systems today was negative      Active Problems:   Patient Active Problem List   Diagnosis    Hypertension    History of gastric bypass    Obesity due to excess calories    Reversible cerebrovascular vasoconstriction syndrome    Other headache syndrome    Tremor, essential    Anxiety    Recurrent major depressive disorder, in partial remission (HCC)       Past Medical History:   Past Medical History:   Diagnosis Date    Anxiety     Hypertension     Reversible cerebrovascular vasoconstriction syndrome     Tremor        Surgical History:   Past Surgical History:   Procedure Laterality Date    CHOLECYSTECTOMY      GASTRIC BYPASS  12/08/2000    for morbid obesity, last assessed 4/27/16       Family History:    Family History   Problem Relation Age of Onset    Hypertension Mother     Diabetes Mother     Hypertension Father     Heart disease Brother     Heart attack Brother     Substance Abuse Brother     Heart failure Brother     Diabetes Brother     Hypertension Brother     Substance Abuse Sister     Substance Abuse Family     Mental illness Neg Hx         neg fam hx       Cancer-related family history is not on file      Social History:   Social History     Socioeconomic History    Marital status: /Civil Union     Spouse name: Not on file    Number of children: Not on file    Years of education: Not on file    Highest education level: Not on file   Occupational History    Not on file   Social Needs    Financial resource strain: Not on file    Food insecurity     Worry: Not on file     Inability: Not on file    Transportation needs     Medical: No     Non-medical: No   Tobacco Use    Smoking status: Former Smoker     Types: Cigarettes    Smokeless tobacco: Never Used    Tobacco comment: social smoker, quit 2017 / never a smoker denied    Substance and Sexual Activity    Alcohol use: Yes     Frequency: 2-3 times a week     Drinks per session: 1 or 2     Binge frequency: Never     Comment: Social drinker per [employer]    Drug use: No    Sexual activity: Not on file   Lifestyle    Physical activity     Days per week: 0 days     Minutes per session: 0 min    Stress: Very much   Relationships    Social connections     Talks on phone: Not on file     Gets together: Not on file     Attends Confucianism service: Not on file     Active member of club or organization: Not on file     Attends meetings of clubs or organizations: Not on file     Relationship status: Not on file    Intimate partner violence     Fear of current or ex partner: Not on file     Emotionally abused: Not on file     Physically abused: Not on file     Forced sexual activity: Not on file   Other Topics Concern    Not on file   Social History Narrative    Wears seatbelt    Denies exercise habits    Good dental hygiene    Lives with friend     Living situation: supportive and safe     No advance directives       Current Medications:   Current Outpatient Medications   Medication Sig Dispense Refill    amitriptyline (ELAVIL) 50 mg tablet Take 1 tablet (50 mg total) by mouth daily at bedtime 30 tablet 3    B Complex Vitamins (VITAMIN B COMPLEX PO) Place under the tongue      B-D TB SYRINGE 1CC/25GX5/8" 25G X 5/8" 1 ML MISC USING 1 EVERY 14 DAYS      Blood Glucose Monitoring Suppl (FreeStyle Lite) MEGHA Testing twice daily 1 each 0    Cholecalciferol (VITAMIN D) 125 MCG (5000 UT) CAPS Take 1 tablet by mouth daily      cyanocobalamin 1,000 mcg/mL INJECT 1 ML (1,000 MCG TOTAL) INTO A MUSCLE EVERY 14 (FOURTEEN) DAYS 6 mL 1    glucose blood (FREESTYLE LITE) test strip Testing twice daily 100 each 5    Lancets (freestyle) lancets Testing twice daily 100 each 5    lisinopril (ZESTRIL) 10 mg tablet TAKE 1 TABLET BY MOUTH EVERY DAY 30 tablet 5    LORazepam (ATIVAN) 1 mg tablet TAKE 1 TABLET BY MOUTH THREE TIMES A DAY 90 tablet 3    Multiple Vitamin-Folic Acid TABS Take 1 tablet by mouth daily      sucralfate (CARAFATE) 1 g tablet Take 1 tablet (1 g total) by mouth every 6 (six) hours as needed (pain) 20 tablet 0    SYRINGE-NEEDLE, DISP, 3 ML (B-D 3CC LUER-FIOR SYR 25GX5/8") 25G X 5/8" 3 ML MISC Using 1 every 14 days 50 each 1    triamterene-hydrochlorothiazide (DYAZIDE) 37 5-25 mg per capsule TAKE 1 CAPSULE BY MOUTH EVERY DAY 90 capsule 3     Current Facility-Administered Medications   Medication Dose Route Frequency Provider Last Rate Last Admin    cyanocobalamin injection 1,000 mcg  1,000 mcg Intramuscular Q30 Days Naomi Wen MD   1,000 mcg at 12/23/20 4562       Allergies: No Known Allergies      Physical Exam:    Body surface area is 2 06 meters squared  Wt Readings from Last 3 Encounters:   03/31/21 97 1 kg (214 lb)   03/11/21 97 3 kg (214 lb 9 6 oz)   12/23/20 93 kg (205 lb)        Temp Readings from Last 3 Encounters:   03/31/21 98 1 °F (36 7 °C) (Temporal)   11/05/20 (!) 96 8 °F (36 °C)   09/23/20 98 9 °F (37 2 °C) (Tympanic)        BP Readings from Last 3 Encounters:   03/31/21 138/88   03/11/21 122/68   12/23/20 120/80         Pulse Readings from Last 3 Encounters:   03/31/21 72   03/11/21 64   12/03/20 58       Physical Exam     Constitutional   General appearance: No acute distress, well appearing and well nourished  Eyes   Conjunctiva and lids: No swelling, erythema or discharge  Pupils and irises: Equal, round and reactive to light  Ears, Nose, Mouth, and Throat   External inspection of ears and nose: Normal     Nasal mucosa, septum, and turbinates: Normal without edema or erythema  Oropharynx: Normal with no erythema, edema, exudate or lesions  Pulmonary   Respiratory effort: No increased work of breathing or signs of respiratory distress  Auscultation of lungs: Clear to auscultation  Cardiovascular   Palpation of heart: Normal PMI, no thrills  Auscultation of heart: Normal rate and rhythm, normal S1 and S2, without murmurs      Examination of extremities for edema and/or varicosities: Normal     Carotid pulses: Normal     Abdomen   Abdomen: Non-tender, no masses  Liver and spleen: No hepatomegaly or splenomegaly  Lymphatic   Palpation of lymph nodes in neck: No lymphadenopathy  Musculoskeletal   Gait and station: Normal     Digits and nails: Normal without clubbing or cyanosis  Inspection/palpation of joints, bones, and muscles: Normal     Skin   Skin and subcutaneous tissue: Normal without rashes or lesions  Neurologic   Cranial nerves: Cranial nerves 2-12 intact  Sensation: No sensory loss  Psychiatric   Orientation to person, place, and time: Normal     Mood and affect: Normal           Assessment/ Plan:        The patient is a pleasant 64-year-old female who was referred to see us for slightly low white count with neutropenia for at least 2-3 years  The rest of her blood counts have remained within normal limits  She does have history of gastric bypass and B12 deficiency for which she is on B12 injections  Her last B12 level was within acceptable limits  At this point based on the chronicity of her mild leukopenia in combination with normal hemoglobin and platelets I doubt this is a leukemia  Possibilities include mild MDS versus medication induced  I will do a flow cytometry along with repeat blood work, an MMA to make sure there are no other abnormalities seen  We did discuss a bone marrow biopsy and the fact that this would probably be low yield considering the chronicity of her low blood count in addition to the normality of her other blood counts  The patient is in agreement with doing the testing mentioned above and then if all negative going on observation  Of see her back in 6 weeks with results of the above testing  Until then if she has any questions she will call our office  Goals and Barriers:  Current Goal: Prolong Survival from   Slightly low white count with slightly low neutrophil count and eosinophils  Barriers: None  Patient's Capacity to Self Care:  Patient  able to self care      Portions of the record may have been created with voice recognition software   Occasional wrong word or "sound a like" substitutions may have occurred due to the inherent limitations of voice recognition software   Read the chart carefully and recognize, using context, where substitutions have occurred      Emergency

## 2021-04-02 DIAGNOSIS — F33.41 RECURRENT MAJOR DEPRESSIVE DISORDER, IN PARTIAL REMISSION (HCC): ICD-10-CM

## 2021-04-05 RX ORDER — AMITRIPTYLINE HYDROCHLORIDE 50 MG/1
TABLET, FILM COATED ORAL
Qty: 30 TABLET | Refills: 3 | Status: SHIPPED | OUTPATIENT
Start: 2021-04-05 | End: 2021-08-09

## 2021-04-06 DIAGNOSIS — Z23 ENCOUNTER FOR IMMUNIZATION: ICD-10-CM

## 2021-04-16 DIAGNOSIS — F41.9 ANXIETY: ICD-10-CM

## 2021-04-16 RX ORDER — LORAZEPAM 1 MG/1
TABLET ORAL
Qty: 90 TABLET | Refills: 3 | Status: SHIPPED | OUTPATIENT
Start: 2021-04-16 | End: 2021-06-10

## 2021-04-22 ENCOUNTER — IMMUNIZATIONS (OUTPATIENT)
Dept: FAMILY MEDICINE CLINIC | Facility: HOSPITAL | Age: 56
End: 2021-04-22

## 2021-04-22 DIAGNOSIS — Z23 ENCOUNTER FOR IMMUNIZATION: Primary | ICD-10-CM

## 2021-04-22 PROCEDURE — 0001A SARS-COV-2 / COVID-19 MRNA VACCINE (PFIZER-BIONTECH) 30 MCG: CPT | Performed by: NURSE PRACTITIONER

## 2021-04-22 PROCEDURE — 91300 SARS-COV-2 / COVID-19 MRNA VACCINE (PFIZER-BIONTECH) 30 MCG: CPT | Performed by: NURSE PRACTITIONER

## 2021-04-27 ENCOUNTER — TELEPHONE (OUTPATIENT)
Dept: NEUROLOGY | Facility: CLINIC | Age: 56
End: 2021-04-27

## 2021-04-29 LAB
ALBUMIN SERPL-MCNC: 4 G/DL (ref 3.6–5.1)
ALBUMIN/GLOB SERPL: 1.8 (CALC) (ref 1–2.5)
ALP SERPL-CCNC: 48 U/L (ref 37–153)
ALT SERPL-CCNC: 12 U/L (ref 6–29)
AST SERPL-CCNC: 19 U/L (ref 10–35)
BASOPHILS # BLD AUTO: 9 CELLS/UL (ref 0–200)
BASOPHILS NFR BLD AUTO: 0.3 %
BILIRUB SERPL-MCNC: 0.4 MG/DL (ref 0.2–1.2)
BUN SERPL-MCNC: 14 MG/DL (ref 7–25)
BUN/CREAT SERPL: ABNORMAL (CALC) (ref 6–22)
CALCIUM SERPL-MCNC: 9 MG/DL (ref 8.6–10.4)
CHLORIDE SERPL-SCNC: 101 MMOL/L (ref 98–110)
CLINICAL INFO: NORMAL
CLINICAL INFO: NORMAL
CO2 SERPL-SCNC: 33 MMOL/L (ref 20–32)
CREAT SERPL-MCNC: 0.7 MG/DL (ref 0.5–1.05)
EOSINOPHIL # BLD AUTO: 0 CELLS/UL (ref 15–500)
EOSINOPHIL NFR BLD AUTO: 0 %
ERYTHROCYTE [DISTWIDTH] IN BLOOD BY AUTOMATED COUNT: 12 % (ref 11–15)
GLOBULIN SER CALC-MCNC: 2.2 G/DL (CALC) (ref 1.9–3.7)
GLUCOSE SERPL-MCNC: 92 MG/DL (ref 65–99)
HCT VFR BLD AUTO: 40.7 % (ref 35–45)
HGB BLD-MCNC: 13.7 G/DL (ref 11.7–15.5)
LYMPHOCYTES # BLD AUTO: 990 CELLS/UL (ref 850–3900)
LYMPHOCYTES NFR BLD AUTO: 33 %
MCH RBC QN AUTO: 30.3 PG (ref 27–33)
MCHC RBC AUTO-ENTMCNC: 33.7 G/DL (ref 32–36)
MCV RBC AUTO: 90 FL (ref 80–100)
METHYLMALONATE SERPL-SCNC: 158 NMOL/L (ref 87–318)
MONOCYTES # BLD AUTO: 264 CELLS/UL (ref 200–950)
MONOCYTES NFR BLD AUTO: 8.8 %
NEUTROPHILS # BLD AUTO: 1737 CELLS/UL (ref 1500–7800)
NEUTROPHILS NFR BLD AUTO: 57.9 %
PATH INTERP SPEC-IMP: NORMAL
PATH INTERP SPEC-IMP: NORMAL
PLATELET # BLD AUTO: 179 THOUSAND/UL (ref 140–400)
PMV BLD REES-ECKER: 9.7 FL (ref 7.5–12.5)
POTASSIUM SERPL-SCNC: 4.1 MMOL/L (ref 3.5–5.3)
PROT SERPL-MCNC: 6.2 G/DL (ref 6.1–8.1)
RBC # BLD AUTO: 4.52 MILLION/UL (ref 3.8–5.1)
SL AMB EGFR AFRICAN AMERICAN: 114 ML/MIN/1.73M2
SL AMB EGFR NON AFRICAN AMERICAN: 98 ML/MIN/1.73M2
SODIUM SERPL-SCNC: 139 MMOL/L (ref 135–146)
SPECIMEN SOURCE: NORMAL
SPECIMEN SOURCE: NORMAL
TCRB GENE REAR BLD/T QL: NEGATIVE
TCRG GENE REAR BLD/T QL: NEGATIVE
WBC # BLD AUTO: 3 THOUSAND/UL (ref 3.8–10.8)

## 2021-05-03 DIAGNOSIS — I10 ESSENTIAL HYPERTENSION: Chronic | ICD-10-CM

## 2021-05-03 RX ORDER — TRIAMTERENE AND HYDROCHLOROTHIAZIDE 37.5; 25 MG/1; MG/1
CAPSULE ORAL
Qty: 30 CAPSULE | Refills: 11 | Status: SHIPPED | OUTPATIENT
Start: 2021-05-03 | End: 2022-05-16

## 2021-05-06 ENCOUNTER — HOSPITAL ENCOUNTER (OUTPATIENT)
Dept: MAMMOGRAPHY | Facility: IMAGING CENTER | Age: 56
Discharge: HOME | End: 2021-05-06
Payer: COMMERCIAL

## 2021-05-06 VITALS — WEIGHT: 217 LBS | BODY MASS INDEX: 34.87 KG/M2 | HEIGHT: 66 IN

## 2021-05-06 DIAGNOSIS — Z12.31 ENCOUNTER FOR SCREENING MAMMOGRAM FOR BREAST CANCER: ICD-10-CM

## 2021-05-06 PROCEDURE — 77063 BREAST TOMOSYNTHESIS BI: CPT

## 2021-05-06 PROCEDURE — 77067 SCR MAMMO BI INCL CAD: CPT

## 2021-05-12 ENCOUNTER — OFFICE VISIT (OUTPATIENT)
Dept: HEMATOLOGY ONCOLOGY | Facility: HOSPITAL | Age: 56
End: 2021-05-12
Payer: COMMERCIAL

## 2021-05-12 VITALS
OXYGEN SATURATION: 98 % | RESPIRATION RATE: 16 BRPM | HEIGHT: 66 IN | SYSTOLIC BLOOD PRESSURE: 122 MMHG | HEART RATE: 88 BPM | WEIGHT: 221 LBS | TEMPERATURE: 98.3 F | DIASTOLIC BLOOD PRESSURE: 76 MMHG | BODY MASS INDEX: 35.52 KG/M2

## 2021-05-12 DIAGNOSIS — Z98.84 HISTORY OF GASTRIC BYPASS: ICD-10-CM

## 2021-05-12 DIAGNOSIS — D72.819 LEUKOPENIA, UNSPECIFIED TYPE: Primary | ICD-10-CM

## 2021-05-12 PROCEDURE — 3074F SYST BP LT 130 MM HG: CPT | Performed by: INTERNAL MEDICINE

## 2021-05-12 PROCEDURE — 99214 OFFICE O/P EST MOD 30 MIN: CPT | Performed by: INTERNAL MEDICINE

## 2021-05-12 PROCEDURE — 3078F DIAST BP <80 MM HG: CPT | Performed by: INTERNAL MEDICINE

## 2021-05-12 NOTE — PROGRESS NOTES
Hematology/Oncology Outpatient Follow- up Note  Renata Avila 48 y o  female MRN: @ Encounter: [id number]        Date:  5/12/2021    Presenting Complaint/Diagnosis :  Low white count with mild neutropenia and slightly low  Eosinophils since 2019    HPI:    Renata Avila is seen for initial consultation 3/31/2021 regarding  An isolated low white count since 2019  Looking through the blood work all the way back to 2019 the patient's white count has fluctuated between 2 5-3 0 with a normal hemoglobin and normal platelet count  The patient's neutrophils have fluctuated between a 1095 and normal   The most recent 1 was 1238  Patient herself has had no issues with frequent infections or the need for antibiotics  She does have a history of a gastric bypass and has been iron deficient in the past and was seen years ago for Venofer  She currently gives herself a B12 shot once a month  Previous Hematologic/ Oncologic History:     workup    Current Hematologic/ Oncologic Treatment:     workup    Interval History:     the patient returns for follow-up visit  Her blood work from April 20th showed a white count of 3 0 with a hemoglobin of 13 7 and platelet count was 319  Differential showed eosinophils at 0 but otherwise the rest of the differential was in normal limits  Patient's T-cell gene rearrangements studies were negative indicating she does not have LGL  Mammogram showed no evidence of malignancy and a repeat mammogram in a year was recommended  The patient herself is at baseline  Denies any nausea denies any vomiting denies any diarrhea  The rest of her 14 point review of systems today was negative  Test Results:    Imaging: Mammo Screening Bilateral W 3d & Cad    Result Date: 5/6/2021  Narrative: DIAGNOSIS: Encounter for screening mammogram for breast cancer TECHNIQUE: Digital screening mammography was performed  Computer Aided Detection (CAD) analyzed all applicable images  COMPARISONS: Prior breast imaging dated: 07/30/2015 and 10/15/2009 RELEVANT HISTORY: Family Breast Cancer History: No known family history of breast cancer  Family Medical History: No known relevant family medical history  Personal History: No known relevant hormone history  No known relevant surgical history  No known relevant medical history  The patient is scheduled in a reminder system for screening mammography  8-10% of cancers will be missed on mammography  Management of a palpable abnormality must be based on clinical grounds  Patients will be notified of their results via letter from our facility  Accredited by [employer] of [employer] and FDA  RISK ASSESSMENT: 5 Year Tyrer-Cuzick: 1 74 % 10 Year Tyrer-Cuzick: 3 81 % Lifetime Tyrer-Cuzick: 13 16 % TISSUE DENSITY: The breasts are almost entirely fatty  INDICATION: Uriel Issa is a 48 y o  female presenting for screening mammography  FINDINGS: There are no suspicious masses, grouped microcalcifications or areas of architectural distortion  The skin and nipple areolar complex are unremarkable  Impression: No mammographic evidence of malignancy  ASSESSMENT/BI-RADS CATEGORY: Left: 1 - Negative Right: 1 - Negative Overall: 1 - Negative RECOMMENDATION:      - Routine screening mammogram in 1 year for both breasts   Workstation ID: [id number]       Labs:   Lab Results   Component Value Date    WBC 3 0 (L) 04/20/2021    HGB 13 7 04/20/2021    HCT 40 7 04/20/2021    MCV 90 0 04/20/2021     04/20/2021     Lab Results   Component Value Date     06/18/2015    K 4 1 04/20/2021     04/20/2021    CO2 33 (H) 04/20/2021    ANIONGAP 8 06/18/2015    BUN 14 04/20/2021    CREATININE 0 70 04/20/2021    GLUCOSE 142 (H) 06/18/2015    GLUF 134 (H) 01/23/2018    CALCIUM 9 0 04/20/2021    AST 19 04/20/2021    ALT 12 04/20/2021    ALKPHOS 48 04/20/2021    EGFR 86 12/23/2018       Lab Results   Component Value Date    IRON 109 08/24/2020    TIBC 326 10/05/2018    FERRITIN 58 10/05/2018       Lab Results   Component Value Date    POXDNYQC50 400 03/02/2021         ROS: As stated in the history of present illness otherwise his 14 point review of systems today was negative  Active Problems:   Patient Active Problem List   Diagnosis    Hypertension    History of gastric bypass    Obesity due to excess calories    Reversible cerebrovascular vasoconstriction syndrome    Other headache syndrome    Tremor, essential    Anxiety    Recurrent major depressive disorder, in partial remission ([facility] [address] )       Past Medical History:   Past Medical History:   Diagnosis Date    Anxiety     Hypertension     Reversible cerebrovascular vasoconstriction syndrome     Tremor        Surgical History:   Past Surgical History:   Procedure Laterality Date    CHOLECYSTECTOMY      GASTRIC BYPASS  12/08/2000    for morbid obesity, last assessed 4/27/16       Family History:    Family History   Problem Relation Age of Onset    Hypertension Mother     Diabetes Mother     Hypertension Father     Prostate cancer Father 80    Heart disease Brother     Heart attack Brother     Substance Abuse Brother     Heart failure Brother     Diabetes Brother     Hypertension Brother     Substance Abuse Sister     Substance Abuse Family     No Known Problems Daughter     No Known Problems Maternal Grandmother     No Known Problems Maternal Grandfather     No Known Problems Paternal Grandmother     No Known Problems Paternal Grandfather     No Known Problems Maternal Aunt     Mental illness Neg Hx         neg fam hx       Cancer-related family history includes Prostate cancer (age of onset: 80) in her father      Social History:   Social History     Socioeconomic History    Marital status: /Civil Union     Spouse name: Not on file    Number of children: Not on file    Years of education: Not on file    Highest education level: Not on file   Occupational History    Not on file   Social Needs    Financial resource strain: Not on file    Food insecurity     Worry: Not on file     Inability: Not on file    Transportation needs     Medical: No     Non-medical: No   Tobacco Use    Smoking status: Former Smoker     Types: Cigarettes    Smokeless tobacco: Never Used    Tobacco comment: social smoker, quit 2017 / never a smoker denied    Substance and Sexual Activity    Alcohol use: Yes     Frequency: 2-3 times a week     Drinks per session: 1 or 2     Binge frequency: Never     Comment: Social drinker per [employer]    Drug use: No    Sexual activity: Not on file   Lifestyle    Physical activity     Days per week: 0 days     Minutes per session: 0 min    Stress: Very much   Relationships    Social connections     Talks on phone: Not on file     Gets together: Not on file     Attends Scientologist service: Not on file     Active member of club or organization: Not on file     Attends meetings of clubs or organizations: Not on file     Relationship status: Not on file    Intimate partner violence     Fear of current or ex partner: Not on file     Emotionally abused: Not on file     Physically abused: Not on file     Forced sexual activity: Not on file   Other Topics Concern    Not on file   Social History Narrative    Wears seatbelt    Denies exercise habits    Good dental hygiene    Lives with friend     Living situation: supportive and safe     No advance directives       Current Medications:   Current Outpatient Medications   Medication Sig Dispense Refill    amitriptyline (ELAVIL) 50 mg tablet TAKE 1 TABLET BY MOUTH DAILY AT BEDTIME 30 tablet 3    B Complex Vitamins (VITAMIN B COMPLEX PO) Place under the tongue      B-D TB SYRINGE 1CC/25GX5/8" 25G X 5/8" 1 ML MISC USING 1 EVERY 14 DAYS      Blood Glucose Monitoring Suppl (FreeStyle Lite) MEGHA Testing twice daily 1 each 0    Cholecalciferol (VITAMIN D) 125 MCG (5000 UT) CAPS Take 1 tablet by mouth daily      cyanocobalamin 1,000 mcg/mL INJECT 1 ML (1,000 MCG TOTAL) INTO A MUSCLE EVERY 14 (FOURTEEN) DAYS 6 mL 1    glucose blood (FREESTYLE LITE) test strip Testing twice daily 100 each 5    Lancets (freestyle) lancets Testing twice daily 100 each 5    lisinopril (ZESTRIL) 10 mg tablet TAKE 1 TABLET BY MOUTH EVERY DAY 30 tablet 5    LORazepam (ATIVAN) 1 mg tablet TAKE 1 TABLET BY MOUTH THREE TIMES A DAY 90 tablet 3    Multiple Vitamin-Folic Acid TABS Take 1 tablet by mouth daily      sucralfate (CARAFATE) 1 g tablet Take 1 tablet (1 g total) by mouth every 6 (six) hours as needed (pain) 20 tablet 0    SYRINGE-NEEDLE, DISP, 3 ML (B-D 3CC LUER-FIOR SYR 25GX5/8") 25G X 5/8" 3 ML MISC Using 1 every 14 days 50 each 1    triamterene-hydrochlorothiazide (DYAZIDE) 37 5-25 mg per capsule TAKE 1 CAPSULE BY MOUTH EVERY DAY 30 capsule 11     Current Facility-Administered Medications   Medication Dose Route Frequency Provider Last Rate Last Admin    cyanocobalamin injection 1,000 mcg  1,000 mcg Intramuscular Q30 Days Mu Mendoza MD   1,000 mcg at 12/23/20 4464       Allergies: No Known Allergies    Physical Exam:    There is no height or weight on file to calculate BSA  Wt Readings from Last 3 Encounters:   05/06/21 98 4 kg (217 lb)   03/31/21 97 1 kg (214 lb)   03/11/21 97 3 kg (214 lb 9 6 oz)        Temp Readings from Last 3 Encounters:   03/31/21 98 1 °F (36 7 °C) (Temporal)   11/05/20 (!) 96 8 °F (36 °C)   09/23/20 98 9 °F (37 2 °C) (Tympanic)        BP Readings from Last 3 Encounters:   03/31/21 138/88   03/11/21 122/68   12/23/20 120/80         Pulse Readings from Last 3 Encounters:   03/31/21 72   03/11/21 64   12/03/20 58        Physical Exam     Constitutional   General appearance: No acute distress, well appearing and well nourished  Eyes   Conjunctiva and lids: No swelling, erythema or discharge  Pupils and irises: Equal, round and reactive to light      Ears, Nose, Mouth, and Throat   External inspection of ears and nose: Normal     Nasal mucosa, septum, and turbinates: Normal without edema or erythema  Oropharynx: Normal with no erythema, edema, exudate or lesions  Pulmonary   Respiratory effort: No increased work of breathing or signs of respiratory distress  Auscultation of lungs: Clear to auscultation  Cardiovascular   Palpation of heart: Normal PMI, no thrills  Auscultation of heart: Normal rate and rhythm, normal S1 and S2, without murmurs  Examination of extremities for edema and/or varicosities: Normal     Carotid pulses: Normal     Abdomen   Abdomen: Non-tender, no masses  Liver and spleen: No hepatomegaly or splenomegaly  Lymphatic   Palpation of lymph nodes in neck: No lymphadenopathy  Musculoskeletal   Gait and station: Normal     Digits and nails: Normal without clubbing or cyanosis  Inspection/palpation of joints, bones, and muscles: Normal     Skin   Skin and subcutaneous tissue: Normal without rashes or lesions  Neurologic   Cranial nerves: Cranial nerves 2-12 intact  Sensation: No sensory loss  Psychiatric   Orientation to person, place, and time: Normal     Mood and affect: Normal         Assessment / Plan:      The patient is a pleasant 78-year-old female who was referred to see us for slightly low white count with neutropenia for at least 2-3 years  The rest of her blood counts have remained within normal limits  She does have history of gastric bypass and B12 deficiency for which she is on B12 injections  Her last B12 level was within acceptable limits  At this point based on the chronicity of her mild leukopenia in combination with normal hemoglobin and platelets I doubt this is a leukemia  Possibilities include mild MDS versus medication induced  Her most recent MMA is normal   T-cell gene rearrangements studies were negative indicating she does not have LGL    CLL/ lymphoma diagnostic panel was negative for any evidence of leukemia I e  the flow cytometry  At this point I suspect 1 of the above etiologies but since the white count is stable and the patient is asymptomatic I think observation is reasonable  I will see her back in 6 months with repeat blood work  Until then if she has any questions she will call our office  Goals and Barriers:  Current Goal:  Prolong Survival from   Leukopenia  Barriers: None  Patient's Capacity to Self Care:  Patient  able to self care  Portions of the record may have been created with voice recognition software   Occasional wrong word or "sound a like" substitutions may have occurred due to the inherent limitations of voice recognition software   Read the chart carefully and recognize, using context, where substitutions have occurred

## 2021-05-16 ENCOUNTER — IMMUNIZATIONS (OUTPATIENT)
Dept: FAMILY MEDICINE CLINIC | Facility: HOSPITAL | Age: 56
End: 2021-05-16

## 2021-05-16 DIAGNOSIS — Z23 ENCOUNTER FOR IMMUNIZATION: Primary | ICD-10-CM

## 2021-05-16 PROCEDURE — 91300 SARS-COV-2 / COVID-19 MRNA VACCINE (PFIZER-BIONTECH) 30 MCG: CPT

## 2021-05-16 PROCEDURE — 0002A SARS-COV-2 / COVID-19 MRNA VACCINE (PFIZER-BIONTECH) 30 MCG: CPT

## 2021-06-10 ENCOUNTER — OFFICE VISIT (OUTPATIENT)
Dept: FAMILY MEDICINE CLINIC | Facility: HOSPITAL | Age: 56
End: 2021-06-10
Payer: COMMERCIAL

## 2021-06-10 VITALS
BODY MASS INDEX: 35.42 KG/M2 | DIASTOLIC BLOOD PRESSURE: 78 MMHG | HEIGHT: 66 IN | OXYGEN SATURATION: 97 % | WEIGHT: 220.4 LBS | HEART RATE: 63 BPM | SYSTOLIC BLOOD PRESSURE: 118 MMHG

## 2021-06-10 DIAGNOSIS — I10 ESSENTIAL HYPERTENSION: Primary | Chronic | ICD-10-CM

## 2021-06-10 DIAGNOSIS — F41.9 ANXIETY: ICD-10-CM

## 2021-06-10 DIAGNOSIS — I67.841 REVERSIBLE CEREBROVASCULAR VASOCONSTRICTION SYNDROME: ICD-10-CM

## 2021-06-10 DIAGNOSIS — I47.1 SUPRAVENTRICULAR TACHYCARDIA (HCC): ICD-10-CM

## 2021-06-10 DIAGNOSIS — Z98.84 HISTORY OF GASTRIC BYPASS: Chronic | ICD-10-CM

## 2021-06-10 DIAGNOSIS — F33.2 SEVERE EPISODE OF RECURRENT MAJOR DEPRESSIVE DISORDER, WITHOUT PSYCHOTIC FEATURES (HCC): ICD-10-CM

## 2021-06-10 PROBLEM — I47.10 SUPRAVENTRICULAR TACHYCARDIA: Status: ACTIVE | Noted: 2021-06-10

## 2021-06-10 PROCEDURE — 3008F BODY MASS INDEX DOCD: CPT | Performed by: PHYSICIAN ASSISTANT

## 2021-06-10 PROCEDURE — 99213 OFFICE O/P EST LOW 20 MIN: CPT | Performed by: PHYSICIAN ASSISTANT

## 2021-06-10 PROCEDURE — 1036F TOBACCO NON-USER: CPT | Performed by: PHYSICIAN ASSISTANT

## 2021-06-10 PROCEDURE — 3725F SCREEN DEPRESSION PERFORMED: CPT | Performed by: PHYSICIAN ASSISTANT

## 2021-06-10 RX ORDER — LORAZEPAM 1 MG/1
1 TABLET ORAL 3 TIMES DAILY
Qty: 100 TABLET | Refills: 3 | Status: SHIPPED | OUTPATIENT
Start: 2021-06-10 | End: 2021-10-20 | Stop reason: SDUPTHER

## 2021-06-10 NOTE — PROGRESS NOTES
Assessment/Plan:         Problem List Items Addressed This Visit        Cardiovascular and Mediastinum    Supraventricular tachycardia ([address] )    Relevant Orders    Lipid panel    Iron, TIBC and Ferritin Panel    Vitamin B12    Vitamin D 25 hydroxy    TSH, 3rd generation with Free T4 reflex    Reversible cerebrovascular vasoconstriction syndrome - Primary    Relevant Orders    Lipid panel    Hemoglobin A1C W/Refl To Glycomark(R)    Iron, TIBC and Ferritin Panel    Vitamin B12    Vitamin D 25 hydroxy    Magnesium    Hypertension (Chronic)    Relevant Orders    Lipid panel    Hemoglobin A1C W/Refl To Glycomark(R)    Iron, TIBC and Ferritin Panel    Vitamin B12    Vitamin D 25 hydroxy    Magnesium    TSH, 3rd generation with Free T4 reflex       Other    Severe episode of recurrent major depressive disorder, without psychotic features (HCC)    Relevant Medications    LORazepam (ATIVAN) 1 mg tablet    History of gastric bypass (Chronic)    Relevant Orders    Lipid panel    Hemoglobin A1C W/Refl To Glycomark(R)    Iron, TIBC and Ferritin Panel    Vitamin B12    Vitamin D 25 hydroxy    Magnesium    TSH, 3rd generation with Free T4 reflex    Anxiety    Relevant Medications    LORazepam (ATIVAN) 1 mg tablet    Other Relevant Orders    Lipid panel    Hemoglobin A1C W/Refl To Glycomark(R)            Subjective:      Patient ID: Amairani Borjas is a 48 y o  female  Presents for follow up  Once a month still feeling bad/down, irritable  Still modifying diet, WFPBNO, whole foods, plant based, no meat; but trying to include some fish in diet  Doing well with new job, there since April 2021, worked until March 25th with old job, which was stressful, and involved a great deal of travel  Review of Systems   Constitutional: Negative for chills, diaphoresis, fatigue and fever  HENT: Negative for congestion, ear pain, rhinorrhea and sore throat      Respiratory: Negative for cough, chest tightness and shortness of breath  Gastrointestinal: Negative for abdominal pain, constipation, diarrhea, nausea and vomiting  Musculoskeletal: Negative for arthralgias, back pain, myalgias, neck pain and neck stiffness  Neurological: Negative for dizziness, weakness, light-headedness, numbness and headaches  Psychiatric/Behavioral: Positive for agitation and dysphoric mood  Negative for self-injury, sleep disturbance and suicidal ideas  The patient is nervous/anxious  Stressed recently, youngest daughter graduating from college  Having graduation parties, people visiting for long weekends  Objective:      /78   Pulse 63   Ht 5' 6" (1 676 m)   Wt 100 kg (220 lb 6 4 oz)   LMP 12/21/2016 (LMP Unknown)   SpO2 97%   BMI 35 57 kg/m²          Physical Exam  Vitals signs and nursing note reviewed  Constitutional:       General: She is not in acute distress  Appearance: Normal appearance  She is not ill-appearing, toxic-appearing or diaphoretic  HENT:      Head: Normocephalic and atraumatic  Cardiovascular:      Rate and Rhythm: Normal rate and regular rhythm  Pulses: Normal pulses  Heart sounds: Normal heart sounds  Pulmonary:      Effort: Pulmonary effort is normal  No respiratory distress  Breath sounds: Normal breath sounds  No stridor  No wheezing or rhonchi  Musculoskeletal: Normal range of motion  General: No swelling, tenderness, deformity or signs of injury  Right lower leg: No edema  Left lower leg: No edema  Neurological:      General: No focal deficit present  Mental Status: She is alert and oriented to person, place, and time  Psychiatric:         Thought Content: Thought content normal          Judgment: Judgment normal       Comments: Appears on edge, somewhat irritable  Flat affect

## 2021-06-23 DIAGNOSIS — I10 ESSENTIAL HYPERTENSION: ICD-10-CM

## 2021-06-23 RX ORDER — LISINOPRIL 10 MG/1
TABLET ORAL
Qty: 30 TABLET | Refills: 5 | Status: SHIPPED | OUTPATIENT
Start: 2021-06-23 | End: 2021-11-15

## 2021-07-02 DIAGNOSIS — E53.8 VITAMIN B12 DEFICIENCY: ICD-10-CM

## 2021-07-02 RX ORDER — CYANOCOBALAMIN 1000 UG/ML
1000 INJECTION INTRAMUSCULAR; SUBCUTANEOUS
Qty: 6 ML | Refills: 1 | Status: SHIPPED | OUTPATIENT
Start: 2021-07-02 | End: 2021-12-03

## 2021-08-19 ENCOUNTER — OFFICE VISIT (OUTPATIENT)
Dept: NEUROLOGY | Facility: CLINIC | Age: 56
End: 2021-08-19
Payer: COMMERCIAL

## 2021-08-19 VITALS
HEART RATE: 80 BPM | DIASTOLIC BLOOD PRESSURE: 80 MMHG | WEIGHT: 223 LBS | SYSTOLIC BLOOD PRESSURE: 130 MMHG | BODY MASS INDEX: 35.84 KG/M2 | HEIGHT: 66 IN

## 2021-08-19 DIAGNOSIS — G44.89 OTHER HEADACHE SYNDROME: ICD-10-CM

## 2021-08-19 DIAGNOSIS — I67.841 REVERSIBLE CEREBROVASCULAR VASOCONSTRICTION SYNDROME: ICD-10-CM

## 2021-08-19 DIAGNOSIS — G25.0 TREMOR, ESSENTIAL: Primary | ICD-10-CM

## 2021-08-19 PROCEDURE — 99214 OFFICE O/P EST MOD 30 MIN: CPT | Performed by: PSYCHIATRY & NEUROLOGY

## 2021-08-19 PROCEDURE — 3008F BODY MASS INDEX DOCD: CPT | Performed by: PSYCHIATRY & NEUROLOGY

## 2021-08-19 PROCEDURE — 3079F DIAST BP 80-89 MM HG: CPT | Performed by: PSYCHIATRY & NEUROLOGY

## 2021-08-19 PROCEDURE — 3075F SYST BP GE 130 - 139MM HG: CPT | Performed by: PSYCHIATRY & NEUROLOGY

## 2021-08-19 PROCEDURE — 1036F TOBACCO NON-USER: CPT | Performed by: PSYCHIATRY & NEUROLOGY

## 2021-08-19 RX ORDER — PRIMIDONE 50 MG/1
25 TABLET ORAL
Qty: 30 TABLET | Refills: 2 | Status: SHIPPED | OUTPATIENT
Start: 2021-08-19 | End: 2021-11-09

## 2021-08-19 NOTE — ASSESSMENT & PLAN NOTE
Noted to be worse R>L  History is not consistent with a physiologic tremor that gets worse with stress and absent all other times  Her symptoms are consistent with an essential tremor that with a tremor consistent with use (eating, writing, and drinking her coffee) and negative at rest  There was noted end point dysmetria on the L hand for finger to nose testing with minimal end point dysmetria on the right  Tremor was non-distractible when assessing  Negative cogwheel rigidity     Plan:  - her normal baseline HR is around the 60s and therefore not a good candidate for propanolol   - At this time, we are starting her on primidone 25mg 1x/day at bedtime and to increase to 50mg 1x/day at bedtime as long as she is able to tolerate    - Follow-up in 3 months with Dr Tana Saldivar

## 2021-08-19 NOTE — PROGRESS NOTES
Patient ID: Juan Lilly is a 48 y o  female  Assessment/Plan:    Tremor, essential  Noted to be worse R>L  History is not consistent with a physiologic tremor that gets worse with stress and absent all other times  Her symptoms are consistent with an essential tremor that with a tremor consistent with use (eating, writing, and drinking her coffee) and negative at rest  There was noted end point dysmetria on the L hand for finger to nose testing with minimal end point dysmetria on the right  Tremor was non-distractible when assessing  Negative cogwheel rigidity     Plan:  - her normal baseline HR is around the 60s and therefore not a good candidate for propanolol   - At this time, we are starting her on primidone 25mg 1x/day at bedtime and to increase to 50mg 1x/day at bedtime as long as she is able to tolerate  - Follow-up in 3 months with Dr Michael Cortes    Reversible cerebrovascular vasoconstriction syndrome  No noted stroke like symptoms (one sided weakness, dysarthria, abnormal numbness/tingling/paresthesias)    - Stable and no issues at this time     Other headache syndrome  Has improved much more compared to a year ago and says much more manageable         Problem List Items Addressed This Visit        Cardiovascular and Mediastinum    Reversible cerebrovascular vasoconstriction syndrome     No noted stroke like symptoms (one sided weakness, dysarthria, abnormal numbness/tingling/paresthesias)    - Stable and no issues at this time             Nervous and Auditory    Tremor, essential - Primary     Noted to be worse R>L  History is not consistent with a physiologic tremor that gets worse with stress and absent all other times   Her symptoms are consistent with an essential tremor that with a tremor consistent with use (eating, writing, and drinking her coffee) and negative at rest  There was noted end point dysmetria on the L hand for finger to nose testing with minimal end point dysmetria on the right  Tremor was non-distractible when assessing  Negative cogwheel rigidity     Plan:  - her normal baseline HR is around the 60s and therefore not a good candidate for propanolol   - At this time, we are starting her on primidone 25mg 1x/day at bedtime and to increase to 50mg 1x/day at bedtime as long as she is able to tolerate  - Follow-up in 3 months with Dr Patrick Serrano         Relevant Medications    primidone (MYSOLINE) 50 mg tablet       Other    Other headache syndrome     Has improved much more compared to a year ago and says much more manageable         Relevant Medications    primidone (MYSOLINE) 50 mg tablet             Subjective:    HPI  She notes that she has a constant R>L tremor since about the last year that has gotten worse compared to the previous years  R hand tremor still the main issue  The left sometimes bothers her but is rarely the one with the tremor  She has had it for most of her adult life  It has progressively gotten worse over the past several years  She notices while eating having to use the L hand to switch her utensil in order to eat and noticed it getting worse later in (worse later in the day), same for coffee/ She noticed when writing instructions it was hard to keep it stable (1 month ago) as well as putting on makeup  It is very apparent when she is not resting her hand on something  She does not notice it when brushing teeth or brushing hair    her stress levels have been very high during her job  Sleep doesn't affect tremor  She is on amitriptyline and ativan by her PCP for her depression and anxiety due to being restricted off any use of SSRI/SNRI due to RCVS      RCVS:  - Bps have been in normal limit and no stroke like symptoms since last visit 1 year ago    Headaches:   - much more manageable although she says gets worse  With her job recently being more stressful     Tremor    She complains of tremor  Tremor began about most of her adult life years ago   Her tremor primarily involves the bilateral hand and occurswith action  Onset of symptoms was gradual, starting about since adulthood 30 years ago  Tremor is currently of moderate severity, exacerbated by activity (intention), stress and fatigue  This tremor affects her handwriting, eating from a spoon and drinking from a cup  Tremor is alleviated by alcohol intake and resting extremity  Symptoms occur upon awakening and last throughout the day  she also describes symptoms of bilateral hand tremor        The following portions of the patient's history were reviewed and updated as appropriate:   She  has a past medical history of Anxiety, Hypertension, Reversible cerebrovascular vasoconstriction syndrome, and Tremor  She   Patient Active Problem List    Diagnosis Date Noted    [facility]ra[facility]cardia [facility] ) 06/10/2021    Severe episode of recurrent major depressive disorder, without psychotic features ([place] [address] ) 06/10/2021    Recurrent major depressive disorder, in partial remission ([place] [address] ) 05/08/2020    Anxiety 11/08/2019    Other headache syndrome 02/13/2018    Tremor, essential 02/13/2018    Reversible cerebrovascular vasoconstriction syndrome 01/28/2018    History of gastric bypass 01/23/2018    Obesity due to excess calories 01/23/2018    Hypertension 01/22/2018     She  has a past surgical history that includes Cholecystectomy and Gastric bypass (12/08/2000)  Her family history includes Diabetes in her brother and mother; Heart attack in her brother; Heart disease in her brother; Heart failure in her brother; Hypertension in her brother, father, and mother; No Known Problems in her daughter, maternal aunt, maternal grandfather, maternal grandmother, paternal grandfather, and paternal grandmother; Prostate cancer (age of onset: 80) in her father; Substance Abuse in her brother, family, and sister  She  reports that she has quit smoking  Her smoking use included cigarettes   She has never used smokeless tobacco  She reports current alcohol use  She reports that she does not use drugs  Current Outpatient Medications   Medication Sig Dispense Refill    amitriptyline (ELAVIL) 50 mg tablet TAKE 1 TABLET BY MOUTH DAILY AT BEDTIME 30 tablet 3    B Complex Vitamins (VITAMIN B COMPLEX PO) Place under the tongue      B-D TB SYRINGE 1CC/25GX5/8" 25G X 5/8" 1 ML MISC USING 1 EVERY 14 DAYS      Blood Glucose Monitoring Suppl (FreeStyle Lite) MEGHA Testing twice daily 1 each 0    Cholecalciferol (VITAMIN D) 125 MCG (5000 UT) CAPS Take 1 tablet by mouth daily      cyanocobalamin 1,000 mcg/mL Inject 1 mL (1,000 mcg total) into a muscle every 14 (fourteen) days 6 mL 1    glucose blood (FREESTYLE LITE) test strip Testing twice daily 100 each 5    glucose-vitamin C 4-0 006 g Chew 16 g      Lancets (freestyle) lancets Testing twice daily 100 each 5    lisinopril (ZESTRIL) 10 mg tablet TAKE 1 TABLET BY MOUTH EVERY DAY 30 tablet 5    LORazepam (ATIVAN) 1 mg tablet Take 1 tablet (1 mg total) by mouth 3 (three) times a day 1 tab  Tid, and 1/2-1 tab  Extra for panic attacks, or increased anxiety   100 tablet 3    Multiple Vitamin-Folic Acid TABS Take 1 tablet by mouth daily      sucralfate (CARAFATE) 1 g tablet Take 1 tablet (1 g total) by mouth every 6 (six) hours as needed (pain) 20 tablet 0    SYRINGE-NEEDLE, DISP, 3 ML (B-D 3CC LUER-FIOR SYR 25GX5/8") 25G X 5/8" 3 ML MISC Using 1 every 14 days 50 each 1    triamterene-hydrochlorothiazide (DYAZIDE) 37 5-25 mg per capsule TAKE 1 CAPSULE BY MOUTH EVERY DAY (Patient taking differently: No sig reported) 30 capsule 11    primidone (MYSOLINE) 50 mg tablet Take 0 5 tablets (25 mg total) by mouth daily at bedtime At 2 weeks and if no side effects/tolerable, increase to 50mg 1x/day at bedtime 30 tablet 2     Current Facility-Administered Medications   Medication Dose Route Frequency Provider Last Rate Last Admin    cyanocobalamin injection 1,000 mcg  1,000 mcg Intramuscular Q30 Days Jil Blanco MD   1,000 mcg at 12/23/20 0174     Current Outpatient Medications on File Prior to Visit   Medication Sig    amitriptyline (ELAVIL) 50 mg tablet TAKE 1 TABLET BY MOUTH DAILY AT BEDTIME    B Complex Vitamins (VITAMIN B COMPLEX PO) Place under the tongue    B-D TB SYRINGE 1CC/25GX5/8" 25G X 5/8" 1 ML MISC USING 1 EVERY 14 DAYS    Blood Glucose Monitoring Suppl (FreeStyle Lite) MEGHA Testing twice daily    Cholecalciferol (VITAMIN D) 125 MCG (5000 UT) CAPS Take 1 tablet by mouth daily    cyanocobalamin 1,000 mcg/mL Inject 1 mL (1,000 mcg total) into a muscle every 14 (fourteen) days    glucose blood (FREESTYLE LITE) test strip Testing twice daily    glucose-vitamin C 4-0 006 g Chew 16 g    Lancets (freestyle) lancets Testing twice daily    lisinopril (ZESTRIL) 10 mg tablet TAKE 1 TABLET BY MOUTH EVERY DAY    LORazepam (ATIVAN) 1 mg tablet Take 1 tablet (1 mg total) by mouth 3 (three) times a day 1 tab  Tid, and 1/2-1 tab  Extra for panic attacks, or increased anxiety   Multiple Vitamin-Folic Acid TABS Take 1 tablet by mouth daily    sucralfate (CARAFATE) 1 g tablet Take 1 tablet (1 g total) by mouth every 6 (six) hours as needed (pain)    SYRINGE-NEEDLE, DISP, 3 ML (B-D 3CC LUER-FIOR SYR 25GX5/8") 25G X 5/8" 3 ML MISC Using 1 every 14 days    triamterene-hydrochlorothiazide (DYAZIDE) 37 5-25 mg per capsule TAKE 1 CAPSULE BY MOUTH EVERY DAY (Patient taking differently: No sig reported)     Current Facility-Administered Medications on File Prior to Visit   Medication    cyanocobalamin injection 1,000 mcg     She has No Known Allergies            Objective:    Blood pressure 130/80, pulse 80, height 5' 6" (1 676 m), weight 101 kg (223 lb), last menstrual period 12/21/2016, not currently breastfeeding  Physical Exam  Vitals reviewed  HENT:      Head: Normocephalic  Mouth/Throat:      Mouth: Mucous membranes are moist       Pharynx: Oropharynx is clear     Eyes:      General: Lids are normal       Extraocular Movements: Extraocular movements intact  Conjunctiva/sclera: Conjunctivae normal       Pupils: Pupils are equal, round, and reactive to light  Cardiovascular:      Pulses: Normal pulses  Pulmonary:      Effort: Pulmonary effort is normal    Abdominal:      General: Abdomen is flat  There is no distension  Musculoskeletal:      Cervical back: Neck supple  Skin:     General: Skin is warm  Neurological:      Mental Status: She is alert  Cranial Nerves: Cranial nerves are intact  Sensory: Sensation is intact  Motor: Motor function is intact  Coordination: Finger-Nose-Finger Test abnormal       Gait: Gait is intact  Deep Tendon Reflexes: Reflexes are normal and symmetric  Psychiatric:         Speech: Speech normal          Neurological Exam  Mental Status  Awake, alert and oriented to person, place and time  Alert  Oriented to person, place, time and situation  Speech is normal  Language is fluent with no aphasia  Cranial Nerves  CN II: Visual acuity is normal  Visual fields full to confrontation  CN III, IV, VI: Extraocular movements intact bilaterally  Normal lids and orbits bilaterally  Pupils equal round and reactive to light bilaterally  CN V: Facial sensation is normal   CN VII: Full and symmetric facial movement  CN VIII: Hearing is normal   CN IX, X: Palate elevates symmetrically  Normal gag reflex  CN XI: Shoulder shrug strength is normal   CN XII: Tongue midline without atrophy or fasciculations  Motor  Normal muscle bulk throughout  Sensory  Sensation is intact to light touch, pinprick, vibration and proprioception in all four extremities  Reflexes  Deep tendon reflexes are 2+ and symmetric in all four extremities with downgoing toes bilaterally      Coordination  Right: Finger-to-nose abnormality: Right finger to nose testing had less if any dysmetria  Left finger to nose had more end point dysmetria compared to the Right  - notes R>L tremor when using hands/up in the air vs at rest     Left: Rapid alternating movement abnormality:    Gait  Normal casual, toe, heel and tandem gait  Normal gait  ROS:    Review of Systems   Constitutional: Negative  Negative for appetite change and fever  HENT: Negative  Negative for hearing loss, tinnitus, trouble swallowing and voice change  Eyes: Negative  Negative for photophobia and pain  Respiratory: Negative  Negative for shortness of breath  Cardiovascular: Negative  Negative for palpitations  Gastrointestinal: Negative  Negative for nausea and vomiting  Endocrine: Negative  Negative for cold intolerance  Genitourinary: Negative for dysuria, frequency and urgency  Musculoskeletal: Negative  Negative for myalgias and neck pain  Skin: Negative  Negative for rash  Neurological: Positive for tremors, weakness and headaches  Negative for dizziness, seizures, syncope, facial asymmetry, speech difficulty, light-headedness and numbness  Patient stated that she has bilateral hand tremors  Hematological: Bruises/bleeds easily  Psychiatric/Behavioral: Negative  Negative for confusion, hallucinations and sleep disturbance

## 2021-08-19 NOTE — ASSESSMENT & PLAN NOTE
No noted stroke like symptoms (one sided weakness, dysarthria, abnormal numbness/tingling/paresthesias)    - Stable and no issues at this time

## 2021-09-13 LAB
25(OH)D3 SERPL-MCNC: 45 NG/ML (ref 30–100)
CHOLEST SERPL-MCNC: 163 MG/DL
CHOLEST/HDLC SERPL: 2.5 (CALC)
FERRITIN SERPL-MCNC: 9 NG/ML (ref 16–232)
HBA1C MFR BLD: 4.7 % OF TOTAL HGB
HDLC SERPL-MCNC: 64 MG/DL
IRON SATN MFR SERPL: 39 % (CALC) (ref 16–45)
IRON SERPL-MCNC: 149 MCG/DL (ref 45–160)
LDLC SERPL CALC-MCNC: 82 MG/DL (CALC)
MAGNESIUM SERPL-MCNC: 1.9 MG/DL (ref 1.5–2.5)
NONHDLC SERPL-MCNC: 99 MG/DL (CALC)
TIBC SERPL-MCNC: 387 MCG/DL (CALC) (ref 250–450)
TRIGL SERPL-MCNC: 83 MG/DL
TSH SERPL-ACNC: 1.61 MIU/L
VIT B12 SERPL-MCNC: >2000 PG/ML (ref 200–1100)

## 2021-09-17 ENCOUNTER — RA CDI HCC (OUTPATIENT)
Dept: OTHER | Facility: HOSPITAL | Age: 56
End: 2021-09-17

## 2021-09-17 NOTE — PROGRESS NOTES
[facility]  coding opportunities       Chart reviewed, no opportunity found: CHART REVIEWED, NO OPPORTUNITY FOUND                        Patients insurance company: [facility] [address]  (Medicare Advantage and Commercial)

## 2021-09-23 ENCOUNTER — OFFICE VISIT (OUTPATIENT)
Dept: FAMILY MEDICINE CLINIC | Facility: HOSPITAL | Age: 56
End: 2021-09-23
Payer: COMMERCIAL

## 2021-09-23 VITALS
SYSTOLIC BLOOD PRESSURE: 112 MMHG | HEIGHT: 66 IN | WEIGHT: 222 LBS | OXYGEN SATURATION: 99 % | HEART RATE: 72 BPM | BODY MASS INDEX: 35.68 KG/M2 | DIASTOLIC BLOOD PRESSURE: 78 MMHG

## 2021-09-23 DIAGNOSIS — I10 ESSENTIAL HYPERTENSION: Primary | Chronic | ICD-10-CM

## 2021-09-23 DIAGNOSIS — F33.2 SEVERE EPISODE OF RECURRENT MAJOR DEPRESSIVE DISORDER, WITHOUT PSYCHOTIC FEATURES (HCC): ICD-10-CM

## 2021-09-23 DIAGNOSIS — Z98.84 HISTORY OF GASTRIC BYPASS: Chronic | ICD-10-CM

## 2021-09-23 DIAGNOSIS — G25.0 TREMOR, ESSENTIAL: ICD-10-CM

## 2021-09-23 DIAGNOSIS — F33.41 RECURRENT MAJOR DEPRESSIVE DISORDER, IN PARTIAL REMISSION (HCC): ICD-10-CM

## 2021-09-23 PROCEDURE — 3074F SYST BP LT 130 MM HG: CPT | Performed by: PHYSICIAN ASSISTANT

## 2021-09-23 PROCEDURE — 1036F TOBACCO NON-USER: CPT | Performed by: PHYSICIAN ASSISTANT

## 2021-09-23 PROCEDURE — 3078F DIAST BP <80 MM HG: CPT | Performed by: PHYSICIAN ASSISTANT

## 2021-09-23 PROCEDURE — 3725F SCREEN DEPRESSION PERFORMED: CPT | Performed by: PHYSICIAN ASSISTANT

## 2021-09-23 PROCEDURE — 3008F BODY MASS INDEX DOCD: CPT | Performed by: PHYSICIAN ASSISTANT

## 2021-09-23 PROCEDURE — 99214 OFFICE O/P EST MOD 30 MIN: CPT | Performed by: PHYSICIAN ASSISTANT

## 2021-09-23 NOTE — PROGRESS NOTES
Assessment/Plan:           Problem List Items Addressed This Visit        Cardiovascular and Mediastinum    Hypertension - Primary (Chronic)- under good   control       Nervous and Auditory    Tremor, essential- improving on Primidone, saw   neurologist       Other    Severe episode of recurrent major depressive disorder, without psychotic features ([facility] )    Recurrent major depressive disorder, in partial [facility] )    History of gastric bypass (Chronic)- under good control            Subjective:      Patient ID: Wayne Garcia is a 48 y o  female  Presents for follow up and to discuss recent blood test    Saw neurologist, about tremors; started on primidone which is helping  Weight is stable, over the summer ate more dairy, occasionally eating more dairy, and veggies  Has been exercising  Working from home, more responsibilities managing people  Sees Dr Ariel Oates every 6 months  Monitors blood pressure-  Usually WNL, and glucose, which may drop on occasion  Daughter in [facility]  Review of Systems   Constitutional: Negative for chills, diaphoresis, fatigue and fever  Respiratory: Negative for cough, chest tightness and shortness of breath  Gastrointestinal: Negative for abdominal pain, constipation, diarrhea, nausea and vomiting  Musculoskeletal: Negative for arthralgias, back pain, myalgias, neck pain and neck stiffness  Neurological: Positive for tremors  Negative for dizziness, syncope, weakness, light-headedness and headaches  Psychiatric/Behavioral: Negative for dysphoric mood, self-injury, sleep disturbance and suicidal ideas  The patient is not nervous/anxious            Objective:      /78   Pulse 72   Ht 5' 6" (1 676 m)   Wt 101 kg (222 lb)   LMP 12/21/2016 (LMP Unknown)   SpO2 99%   BMI 35 83 kg/m²          Physical Exam

## 2021-10-18 ENCOUNTER — TELEPHONE (OUTPATIENT)
Dept: FAMILY MEDICINE CLINIC | Facility: HOSPITAL | Age: 56
End: 2021-10-18

## 2021-10-25 DIAGNOSIS — F41.9 ANXIETY: ICD-10-CM

## 2021-10-25 RX ORDER — LORAZEPAM 2 MG/1
1 TABLET ORAL 3 TIMES DAILY PRN
Qty: 90 TABLET | Refills: 2 | Status: SHIPPED | OUTPATIENT
Start: 2021-10-25 | End: 2022-03-16 | Stop reason: SDUPTHER

## 2021-10-29 DIAGNOSIS — R73.09: ICD-10-CM

## 2021-10-29 RX ORDER — BLOOD-GLUCOSE METER
KIT MISCELLANEOUS
Qty: 100 STRIP | Refills: 5 | Status: SHIPPED | OUTPATIENT
Start: 2021-10-29 | End: 2022-03-16 | Stop reason: SDUPTHER

## 2021-11-03 ENCOUNTER — TELEPHONE (OUTPATIENT)
Dept: HEMATOLOGY ONCOLOGY | Facility: CLINIC | Age: 56
End: 2021-11-03

## 2021-11-06 DIAGNOSIS — G25.0 TREMOR, ESSENTIAL: ICD-10-CM

## 2021-11-09 RX ORDER — PRIMIDONE 50 MG/1
TABLET ORAL
Qty: 30 TABLET | Refills: 2 | Status: SHIPPED | OUTPATIENT
Start: 2021-11-09 | End: 2021-12-08 | Stop reason: CLARIF

## 2021-11-15 DIAGNOSIS — I10 ESSENTIAL HYPERTENSION: ICD-10-CM

## 2021-11-15 RX ORDER — LISINOPRIL 10 MG/1
TABLET ORAL
Qty: 30 TABLET | Refills: 5 | Status: SHIPPED | OUTPATIENT
Start: 2021-11-15 | End: 2022-03-16

## 2021-11-30 LAB
ALBUMIN SERPL-MCNC: 4.2 G/DL (ref 3.6–5.1)
ALBUMIN/GLOB SERPL: 1.9 (CALC) (ref 1–2.5)
ALP SERPL-CCNC: 53 U/L (ref 37–153)
ALT SERPL-CCNC: 14 U/L (ref 6–29)
AST SERPL-CCNC: 22 U/L (ref 10–35)
BASOPHILS # BLD AUTO: 30 CELLS/UL (ref 0–200)
BASOPHILS NFR BLD AUTO: 0.9 %
BILIRUB SERPL-MCNC: 0.5 MG/DL (ref 0.2–1.2)
BUN SERPL-MCNC: 10 MG/DL (ref 7–25)
BUN/CREAT SERPL: NORMAL (CALC) (ref 6–22)
CALCIUM SERPL-MCNC: 9 MG/DL (ref 8.6–10.4)
CHLORIDE SERPL-SCNC: 102 MMOL/L (ref 98–110)
CO2 SERPL-SCNC: 31 MMOL/L (ref 20–32)
CREAT SERPL-MCNC: 0.67 MG/DL (ref 0.5–1.05)
EOSINOPHIL # BLD AUTO: 0 CELLS/UL (ref 15–500)
EOSINOPHIL NFR BLD AUTO: 0 %
ERYTHROCYTE [DISTWIDTH] IN BLOOD BY AUTOMATED COUNT: 12.5 % (ref 11–15)
GLOBULIN SER CALC-MCNC: 2.2 G/DL (CALC) (ref 1.9–3.7)
GLUCOSE SERPL-MCNC: 77 MG/DL (ref 65–99)
HCT VFR BLD AUTO: 39.2 % (ref 35–45)
HGB BLD-MCNC: 13.2 G/DL (ref 11.7–15.5)
LYMPHOCYTES # BLD AUTO: 1261 CELLS/UL (ref 850–3900)
LYMPHOCYTES NFR BLD AUTO: 38.2 %
MCH RBC QN AUTO: 28.9 PG (ref 27–33)
MCHC RBC AUTO-ENTMCNC: 33.7 G/DL (ref 32–36)
MCV RBC AUTO: 86 FL (ref 80–100)
METHYLMALONATE SERPL-SCNC: 110 NMOL/L (ref 87–318)
MONOCYTES # BLD AUTO: 304 CELLS/UL (ref 200–950)
MONOCYTES NFR BLD AUTO: 9.2 %
NEUTROPHILS # BLD AUTO: 1706 CELLS/UL (ref 1500–7800)
NEUTROPHILS NFR BLD AUTO: 51.7 %
PLATELET # BLD AUTO: 201 THOUSAND/UL (ref 140–400)
PMV BLD REES-ECKER: 10 FL (ref 7.5–12.5)
POTASSIUM SERPL-SCNC: 4.2 MMOL/L (ref 3.5–5.3)
PROT SERPL-MCNC: 6.4 G/DL (ref 6.1–8.1)
RBC # BLD AUTO: 4.56 MILLION/UL (ref 3.8–5.1)
SL AMB EGFR AFRICAN AMERICAN: 115 ML/MIN/1.73M2
SL AMB EGFR NON AFRICAN AMERICAN: 99 ML/MIN/1.73M2
SODIUM SERPL-SCNC: 138 MMOL/L (ref 135–146)
WBC # BLD AUTO: 3.3 THOUSAND/UL (ref 3.8–10.8)

## 2021-12-08 ENCOUNTER — TELEMEDICINE (OUTPATIENT)
Dept: NEUROLOGY | Facility: CLINIC | Age: 56
End: 2021-12-08
Payer: COMMERCIAL

## 2021-12-08 DIAGNOSIS — G25.0 TREMOR, ESSENTIAL: ICD-10-CM

## 2021-12-08 DIAGNOSIS — I67.841 REVERSIBLE CEREBROVASCULAR VASOCONSTRICTION SYNDROME: Primary | ICD-10-CM

## 2021-12-08 PROCEDURE — 1036F TOBACCO NON-USER: CPT | Performed by: PSYCHIATRY & NEUROLOGY

## 2021-12-08 PROCEDURE — 99214 OFFICE O/P EST MOD 30 MIN: CPT | Performed by: PSYCHIATRY & NEUROLOGY

## 2021-12-08 RX ORDER — PRIMIDONE 50 MG/1
50 TABLET ORAL DAILY
Qty: 30 TABLET | Refills: 2 | Status: SHIPPED | OUTPATIENT
Start: 2021-12-08 | End: 2022-01-30

## 2021-12-13 ENCOUNTER — TELEPHONE (OUTPATIENT)
Dept: HEMATOLOGY ONCOLOGY | Facility: HOSPITAL | Age: 56
End: 2021-12-13

## 2021-12-13 DIAGNOSIS — D72.819 LEUKOPENIA, UNSPECIFIED TYPE: Primary | ICD-10-CM

## 2022-01-25 ENCOUNTER — TELEPHONE (OUTPATIENT)
Dept: HEMATOLOGY ONCOLOGY | Facility: CLINIC | Age: 57
End: 2022-01-25

## 2022-01-25 NOTE — TELEPHONE ENCOUNTER
Patient calling to confirm if there is an order for blood work  She has an appoint for Dr Isa Tejeda 1/31/2022 @ 9:22am  She can be reached at [phone]

## 2022-01-26 ENCOUNTER — TELEMEDICINE (OUTPATIENT)
Dept: NEUROLOGY | Facility: CLINIC | Age: 57
End: 2022-01-26
Payer: COMMERCIAL

## 2022-01-26 DIAGNOSIS — I67.841 REVERSIBLE CEREBROVASCULAR VASOCONSTRICTION SYNDROME: Primary | ICD-10-CM

## 2022-01-26 DIAGNOSIS — G25.0 TREMOR, ESSENTIAL: ICD-10-CM

## 2022-01-26 PROCEDURE — 99214 OFFICE O/P EST MOD 30 MIN: CPT | Performed by: PSYCHIATRY & NEUROLOGY

## 2022-01-26 PROCEDURE — 1036F TOBACCO NON-USER: CPT | Performed by: PSYCHIATRY & NEUROLOGY

## 2022-01-26 NOTE — PROGRESS NOTES
Virtual Regular Visit    Verification of patient location:    Patient is located in the following state in which I hold an active license [place]      Assessment/Plan:    Problem List Items Addressed This Visit        Cardiovascular and Mediastinum    Reversible cerebrovascular vasoconstriction syndrome - Primary     2/2 SSRIs  -avoid caffeine/red meat/SNRIs and SSRis  -discontinued depakote which might be worsening her action/intention tremor at this time  -patients BP within normal limit   -patient on amitriptyline currently  -no new symptoms ie headaches, cognitive issues  Nervous and Auditory    Tremor, essential     Patient increased to primidone 50mg PO BID 4 days ago  Will continue at this dose, and if no improvement then will consider others such as topamax or gabapentin  Follow up - 2 months  I would be happy to see the patient sooner if any new questions/concerns arise  Patient/Guardian was advised to the call the office if they have any questions and concerns in the meantime  Patient/Guardian does understand that if they have any new stroke like symptoms such as facial droop on one side, weakness/paralysis on either side, speech trouble, numbness on one side, balance issues, any vision changes, extreme dizziness or any new headache, to call 9-1-1 immediately or to proceed to the nearest ER immediately  Reason for visit is   Chief Complaint   Patient presents with    Virtual Regular Visit        Encounter provider Torrie Rock MD    Provider located at [address] [place] [id number]      Recent Visits  No visits were found meeting these conditions    Showing recent visits within past 7 days and meeting all other requirements  Today's Visits  Date Type Provider Dept   01/26/22 Telemedicine Torrie Rock MD [facility]   Showing today's visits and meeting all other requirements  Future Appointments  No visits were found meeting these conditions  Showing future appointments within next 150 days and meeting all other requirements       The patient was identified by name and date of birth  Dean Howe was informed that this is a telemedicine visit and that the visit is being conducted through [address] [place] [address] [facility] and patient was informed that this is a secure, HIPAA-compliant platform  She agrees to proceed     My office door was closed  No one else was in the room  She acknowledged consent and understanding of privacy and security of the video platform  The patient has agreed to participate and understands they can discontinue the visit at any time  Patient is aware this is a billable service  Subjective  Dean Howe is a 55 y o  female  Who is a follow up for RCVS and essential tremor    She says that primidone 50mg during the day was helping but she still had tremor, and sometimes does drop things  She then stated that we would increase to 50mg PO BID and has noticed a difference but she only increased it 4 days ago  She has no other issues  No new TIA/CVA like symptoms         HPI     Past Medical History:   Diagnosis Date    Anxiety     Hypertension     Reversible cerebrovascular vasoconstriction syndrome     Tremor        Past Surgical History:   Procedure Laterality Date    CHOLECYSTECTOMY      GASTRIC BYPASS  12/08/2000    for morbid obesity, last assessed 4/27/16       Current Outpatient Medications   Medication Sig Dispense Refill    amitriptyline (ELAVIL) 50 mg tablet TAKE 1 TABLET BY MOUTH DAILY AT BEDTIME 30 tablet 3    B Complex Vitamins (VITAMIN B COMPLEX PO) Place under the tongue      B-D TB SYRINGE 1CC/25GX5/8" 25G X 5/8" 1 ML MISC USING 1 EVERY 14 DAYS      Blood Glucose Monitoring Suppl (FreeStyle Lite) MEGHA Testing twice daily 1 each 0    Cholecalciferol (VITAMIN D) 125 MCG (5000 UT) CAPS Take 1 tablet by mouth daily      cyanocobalamin 1,000 mcg/mL INJECT 1 ML (1,000 MCG TOTAL) INTO A MUSCLE EVERY 14 (FOURTEEN) DAYS 2 mL 5    glucose blood (FREESTYLE LITE) test strip TEST TWICE A DAY **[id number]** 100 strip 5    glucose-vitamin C 4-0 006 g Chew 16 g      Lancets (freestyle) lancets Testing twice daily 100 each 5    lisinopril (ZESTRIL) 10 mg tablet TAKE 1 TABLET BY MOUTH EVERY DAY 30 tablet 5    LORazepam (ATIVAN) 2 mg tablet Take 0 5 tablets (1 mg total) by mouth 3 (three) times a day as needed for anxiety 1 tab  Tid, and 1/2-1 tab  Extra for panic attacks, or increased anxiety  90 tablet 2    Multiple Vitamin-Folic Acid TABS Take 1 tablet by mouth daily      primidone (MYSOLINE) 50 mg tablet Take 1 tablet (50 mg total) by mouth daily 30 tablet 2    sucralfate (CARAFATE) 1 g tablet Take 1 tablet (1 g total) by mouth every 6 (six) hours as needed (pain) 20 tablet 0    SYRINGE-NEEDLE, DISP, 3 ML (B-D 3CC LUER-FIOR SYR 25GX5/8") 25G X 5/8" 3 ML MISC Using 1 every 14 days 50 each 1    triamterene-hydrochlorothiazide (DYAZIDE) 37 5-25 mg per capsule TAKE 1 CAPSULE BY MOUTH EVERY DAY (Patient taking differently: No sig reported) 30 capsule 11     Current Facility-Administered Medications   Medication Dose Route Frequency Provider Last Rate Last Admin    cyanocobalamin injection 1,000 mcg  1,000 mcg Intramuscular Q30 Days Maximilian Pal MD   1,000 mcg at 12/23/20 6043        No Known Allergies    Review of Systems   Constitutional: Negative  Negative for appetite change and fever  HENT: Negative  Negative for hearing loss, tinnitus, trouble swallowing and voice change  Eyes: Negative  Negative for photophobia and pain  Respiratory: Negative  Negative for shortness of breath  Cardiovascular: Negative  Negative for palpitations  Gastrointestinal: Negative  Negative for nausea and vomiting  Endocrine: Negative  Negative for cold intolerance  Genitourinary: Negative  Negative for dysuria, frequency and urgency  Musculoskeletal: Negative  Negative for myalgias and neck pain  Skin: Negative  Negative for rash  Neurological: Positive for tremors  Negative for dizziness, seizures, syncope, facial asymmetry, speech difficulty, weakness, light-headedness, numbness and headaches  Hematological: Negative  Does not bruise/bleed easily  Psychiatric/Behavioral: Negative  Negative for confusion, hallucinations and sleep disturbance  Video Exam    There were no vitals filed for this visit  Physical Exam     I spent 10 minutes directly with the patient during this visit    VIRTUAL VISIT 130[address] [facility] verbally agrees to participate in [employer]  Pt is aware that [employer] could be limited without vital signs or the ability to perform a full hands-on physical [place] [facility] understands she or the provider may request at any time to terminate the video visit and request the patient to seek care or treatment in person

## 2022-01-26 NOTE — ASSESSMENT & PLAN NOTE
Patient increased to primidone 50mg PO BID 4 days ago  Will continue at this dose, and if no improvement then will consider others such as topamax or gabapentin

## 2022-01-26 NOTE — ASSESSMENT & PLAN NOTE
2/2 SSRIs  -avoid caffeine/red meat/SNRIs and SSRis  -discontinued depakote which might be worsening her action/intention tremor at this time  -patients BP within normal limit   -patient on amitriptyline currently  -no new symptoms ie headaches, cognitive issues

## 2022-01-29 DIAGNOSIS — F33.41 RECURRENT MAJOR DEPRESSIVE DISORDER, IN PARTIAL REMISSION (HCC): ICD-10-CM

## 2022-01-29 DIAGNOSIS — G25.0 TREMOR, ESSENTIAL: ICD-10-CM

## 2022-01-29 RX ORDER — AMITRIPTYLINE HYDROCHLORIDE 50 MG/1
TABLET, FILM COATED ORAL
Qty: 90 TABLET | Refills: 1 | Status: SHIPPED | OUTPATIENT
Start: 2022-01-29 | End: 2022-07-08 | Stop reason: SDUPTHER

## 2022-01-30 RX ORDER — PRIMIDONE 50 MG/1
TABLET ORAL
Qty: 90 TABLET | Refills: 2 | Status: SHIPPED | OUTPATIENT
Start: 2022-01-30 | End: 2022-03-16 | Stop reason: SDUPTHER

## 2022-01-31 ENCOUNTER — OFFICE VISIT (OUTPATIENT)
Dept: HEMATOLOGY ONCOLOGY | Facility: HOSPITAL | Age: 57
End: 2022-01-31
Payer: COMMERCIAL

## 2022-01-31 VITALS
HEIGHT: 66 IN | DIASTOLIC BLOOD PRESSURE: 92 MMHG | SYSTOLIC BLOOD PRESSURE: 142 MMHG | RESPIRATION RATE: 14 BRPM | BODY MASS INDEX: 36.77 KG/M2 | TEMPERATURE: 97.5 F | HEART RATE: 75 BPM | WEIGHT: 228.8 LBS | OXYGEN SATURATION: 98 %

## 2022-01-31 DIAGNOSIS — D47.1 MYELOPROLIFERATIVE DISEASE (HCC): ICD-10-CM

## 2022-01-31 DIAGNOSIS — D50.8 IRON DEFICIENCY ANEMIA SECONDARY TO INADEQUATE DIETARY IRON INTAKE: Primary | ICD-10-CM

## 2022-01-31 DIAGNOSIS — C91.Z0 LARGE GRANULAR LYMPHOCYTE DISORDER (HCC): ICD-10-CM

## 2022-01-31 DIAGNOSIS — Z98.84 HISTORY OF GASTRIC BYPASS: ICD-10-CM

## 2022-01-31 PROCEDURE — 3008F BODY MASS INDEX DOCD: CPT | Performed by: PSYCHIATRY & NEUROLOGY

## 2022-01-31 PROCEDURE — 99214 OFFICE O/P EST MOD 30 MIN: CPT | Performed by: INTERNAL MEDICINE

## 2022-01-31 RX ORDER — SODIUM CHLORIDE 9 MG/ML
20 INJECTION, SOLUTION INTRAVENOUS ONCE
Status: CANCELLED | OUTPATIENT
Start: 2022-02-14

## 2022-01-31 NOTE — PROGRESS NOTES
Hematology/Oncology Outpatient Follow- up Note  Maximiliano Mliler 55 y o  female MRN: @ Encounter: [id number]        Date:  1/31/2022    Presenting Complaint/Diagnosis :  Low white count with mild neutropenia and slightly low  Eosinophils since 2019    HPI:    Bailey Robles seen for initial consultation 3/31/2021 regarding  An isolated low white count since 2019   Looking through the blood work all the way back to 2019 the patient's white count has fluctuated between 2 5-3 0 with a normal hemoglobin and normal platelet count   The patient's neutrophils have fluctuated between a 1095 and normal   The most recent 1 was 1238   Patient herself has had no issues with frequent infections or the need for antibiotics  She does have a history of a gastric bypass and has been iron deficient in the past and was seen years ago for Venofer   She currently gives herself a B12 shot once a month  Previous Hematologic/ Oncologic History:    Venofer    Current Hematologic/ Oncologic Treatment:    The patient is on observation and we will put her on Venofer every 2 months    Interval History:    The patient returns for follow-up visit  She states she is doing well  Really denies any complaints apart from fatigue today  Her ferritin was low few months ago so I think she will benefit from Venofer as she has had a gastric bypass and has been iron deficient in the past   Denies any nausea denies any vomiting denies any frequent infections  Otherwise is at baseline  The rest of her 14 point review of systems today was negative  Test Results:    Imaging: No results found      Labs:   Lab Results   Component Value Date    WBC 3 2 (L) 01/27/2022    HGB 13 1 01/27/2022    HCT 39 4 01/27/2022    MCV 87 6 01/27/2022     01/27/2022     Lab Results   Component Value Date     06/18/2015    K 4 2 01/27/2022    CL 98 01/27/2022    CO2 31 01/27/2022    ANIONGAP 8 06/18/2015    BUN 8 01/27/2022    CREATININE 0 69 01/27/2022    GLUCOSE 142 (H) 06/18/2015    GLUF 134 (H) 01/23/2018    CALCIUM 9 4 01/27/2022    AST 22 01/27/2022    ALT 13 01/27/2022    ALKPHOS 56 01/27/2022    EGFR 86 12/23/2018       Lab Results   Component Value Date    IRON 149 09/13/2021    TIBC 387 09/13/2021    FERRITIN 9 (L) 09/13/2021       Lab Results   Component Value Date    GXHVTBKZ10 >2,000 (H) 09/13/2021         ROS: As stated in the history of present illness otherwise his 14 point review of systems today was negative        Active Problems:   Patient Active Problem List   Diagnosis    Hypertension    History of gastric bypass    Obesity due to excess calories    Reversible cerebrovascular vasoconstriction syndrome    Other headache syndrome    Tremor, essential    Anxiety    Recurrent major depressive disorder, in partial remission ([address]    Supraventricular tachycardia ([address] )    Severe episode of recurrent major depressive disorder, without psychotic features ([address] )       Past Medical History:   Past Medical History:   Diagnosis Date    Anxiety     Hypertension     Reversible cerebrovascular vasoconstriction syndrome     Tremor        Surgical History:   Past Surgical History:   Procedure Laterality Date    CHOLECYSTECTOMY      GASTRIC BYPASS  12/08/2000    for morbid obesity, last assessed 4/27/16       Family History:    Family History   Problem Relation Age of Onset    Hypertension Mother     Diabetes Mother     Hypertension Father     Prostate cancer Father 80    Heart disease Brother     Heart attack Brother     Substance Abuse Brother     Heart failure Brother     Diabetes Brother     Hypertension Brother     Substance Abuse Sister     Substance Abuse Family     No Known Problems Daughter     No Known Problems Maternal Grandmother     No Known Problems Maternal Grandfather     No Known Problems Paternal Grandmother     No Known Problems Paternal Grandfather     No Known Problems Maternal Aunt     Mental illness Neg Hx         neg fam hx       Cancer-related family history includes Prostate cancer (age of onset: 80) in her father      Social History:   Social History     Socioeconomic History    Marital status: /Civil Union     Spouse name: Not on file    Number of children: Not on file    Years of education: Not on file    Highest education level: Not on file   Occupational History    Not on file   Tobacco Use    Smoking status: Former Smoker     Types: Cigarettes    Smokeless tobacco: Never Used    Tobacco comment: social smoker, quit 2017 / never a smoker denied    Vaping Use    Vaping Use: Never used   Substance and Sexual Activity    Alcohol use: Yes     Comment: Social drinker per [employer]    Drug use: No    Sexual activity: Not on file   Other Topics Concern    Not on file   Social History Narrative    Wears seatbelt    Denies exercise habits    Good dental hygiene    Lives with friend     Living situation: supportive and safe     No advance directives     Social Determinants of Health     Financial Resource Strain: Not on file   Food Insecurity: Not on file   Transportation Needs: Not on file   Physical Activity: Not on file   Stress: Not on file   Social Connections: Not on file   Intimate Partner Violence: Not on file   Housing Stability: Not on file       Current Medications:   Current Outpatient Medications   Medication Sig Dispense Refill    amitriptyline (ELAVIL) 50 mg tablet TAKE 1 TABLET BY MOUTH EVERYDAY AT BEDTIME 90 tablet 1    B Complex Vitamins (VITAMIN B COMPLEX PO) Place under the tongue      B-D TB SYRINGE 1CC/25GX5/8" 25G X 5/8" 1 ML MISC USING 1 EVERY 14 DAYS      Blood Glucose Monitoring Suppl (FreeStyle Lite) MEGHA Testing twice daily 1 each 0    Cholecalciferol (VITAMIN D) 125 MCG (5000 UT) CAPS Take 1 tablet by mouth daily      cyanocobalamin 1,000 mcg/mL INJECT 1 ML (1,000 MCG TOTAL) INTO A MUSCLE EVERY 14 (FOURTEEN) DAYS 2 mL 5    glucose blood (FREESTYLE LITE) test strip TEST TWICE A DAY **DX:R73 9** 100 strip 5    glucose-vitamin C 4-0 006 g Chew 16 g      Lancets (freestyle) lancets Testing twice daily 100 each 5    lisinopril (ZESTRIL) 10 mg tablet TAKE 1 TABLET BY MOUTH EVERY DAY 30 tablet 5    LORazepam (ATIVAN) 2 mg tablet Take 0 5 tablets (1 mg total) by mouth 3 (three) times a day as needed for anxiety 1 tab  Tid, and 1/2-1 tab  Extra for panic attacks, or increased anxiety  90 tablet 2    Multiple Vitamin-Folic Acid TABS Take 1 tablet by mouth daily      primidone (MYSOLINE) 50 mg tablet TAKE 1 TABLET BY MOUTH EVERY DAY 90 tablet 2    sucralfate (CARAFATE) 1 g tablet Take 1 tablet (1 g total) by mouth every 6 (six) hours as needed (pain) 20 tablet 0    SYRINGE-NEEDLE, DISP, 3 ML (B-D 3CC LUER-FIOR SYR 25GX5/8") 25G X 5/8" 3 ML MISC Using 1 every 14 days 50 each 1    triamterene-hydrochlorothiazide (DYAZIDE) 37 5-25 mg per capsule TAKE 1 CAPSULE BY MOUTH EVERY DAY (Patient taking differently: No sig reported) 30 capsule 11     Current Facility-Administered Medications   Medication Dose Route Frequency Provider Last Rate Last Admin    cyanocobalamin injection 1,000 mcg  1,000 mcg Intramuscular Q30 Days Lilian Salmeron MD   1,000 mcg at 12/23/20 7259       Allergies: No Known Allergies    Physical Exam:    Body surface area is 2 12 meters squared  Wt Readings from Last 3 Encounters:   01/31/22 104 kg (228 lb 12 8 oz)   09/23/21 101 kg (222 lb)   08/19/21 101 kg (223 lb)        Temp Readings from Last 3 Encounters:   01/31/22 97 5 °F (36 4 °C) (Temporal)   05/12/21 98 3 °F (36 8 °C) (Temporal)   03/31/21 98 1 °F (36 7 °C) (Temporal)        BP Readings from Last 3 Encounters:   01/31/22 142/92   09/23/21 112/78   08/19/21 130/80         Pulse Readings from Last 3 Encounters:   01/31/22 75   09/23/21 72   08/19/21 80         Physical Exam     Constitutional   General appearance: No acute distress, well appearing and well nourished      Eyes   Conjunctiva and lids: No swelling, erythema or discharge  Pupils and irises: Equal, round and reactive to light  Ears, Nose, Mouth, and Throat   External inspection of ears and nose: Normal     Nasal mucosa, septum, and turbinates: Normal without edema or erythema  Oropharynx: Normal with no erythema, edema, exudate or lesions  Pulmonary   Respiratory effort: No increased work of breathing or signs of respiratory distress  Auscultation of lungs: Clear to auscultation  Cardiovascular   Palpation of heart: Normal PMI, no thrills  Auscultation of heart: Normal rate and rhythm, normal S1 and S2, without murmurs  Examination of extremities for edema and/or varicosities: Normal     Carotid pulses: Normal     Abdomen   Abdomen: Non-tender, no masses  Liver and spleen: No hepatomegaly or splenomegaly  Lymphatic   Palpation of lymph nodes in neck: No lymphadenopathy  Musculoskeletal   Gait and station: Normal     Digits and nails: Normal without clubbing or cyanosis  Inspection/palpation of joints, bones, and muscles: Normal     Skin   Skin and subcutaneous tissue: Normal without rashes or lesions  Neurologic   Cranial nerves: Cranial nerves 2-12 intact  Sensation: No sensory loss  Psychiatric   Orientation to person, place, and time: Normal     Mood and affect: Normal         Assessment / Plan:       The patient is a pleasant 55-year-old female who was referred to see us for slightly low white count with neutropenia for at least 2-3 years   The rest of her blood counts have remained within normal limits   She does have history of gastric bypass and B12 deficiency for which she is on B12 injections   Her last B12 level was within acceptable limits  She does get B12 injections every 2 months   At this point based on the chronicity of her mild leukopenia in combination with normal hemoglobin and platelets I doubt this is a leukemia   Possibilities include mild MDS versus medication induced  Her most recent MMA is normal   T-cell gene rearrangements studies were negative indicating she does not have LGL  CLL/ lymphoma diagnostic panel was negative for any evidence of leukemia I e  the flow cytometry  At this point I suspect 1 of the above etiologies but since the white count is stable and the patient is asymptomatic I think observation is reasonable  Her ferritin was low and she has fatigue so I will put her on Venofer every 2 months  This is because she has had a gastric bypass  I will see her back in 6 months with repeat blood work  Until then if she has any questions she will call our office  Goals and Barriers:  Current Goal:  Prolong Survival from leukopenia and iron deficiency  Barriers: None  Patient's Capacity to Self Care:  Patient able to self care  Portions of the record may have been created with voice recognition software  Occasional wrong word or "sound a like" substitutions may have occurred due to the inherent limitations of voice recognition software  Read the chart carefully and recognize, using context, where substitutions have occurred

## 2022-02-14 ENCOUNTER — HOSPITAL ENCOUNTER (OUTPATIENT)
Dept: INFUSION CENTER | Facility: HOSPITAL | Age: 57
Discharge: HOME | End: 2022-02-14
Attending: INTERNAL MEDICINE
Payer: COMMERCIAL

## 2022-02-14 VITALS
TEMPERATURE: 97.8 F | DIASTOLIC BLOOD PRESSURE: 87 MMHG | SYSTOLIC BLOOD PRESSURE: 140 MMHG | HEART RATE: 62 BPM | OXYGEN SATURATION: 100 %

## 2022-02-14 DIAGNOSIS — D50.8 IRON DEFICIENCY ANEMIA SECONDARY TO INADEQUATE DIETARY IRON INTAKE: Primary | ICD-10-CM

## 2022-02-14 PROCEDURE — 96365 THER/PROPH/DIAG IV INF INIT: CPT

## 2022-02-14 RX ORDER — SODIUM CHLORIDE 9 MG/ML
20 INJECTION, SOLUTION INTRAVENOUS ONCE
Status: COMPLETED | OUTPATIENT
Start: 2022-02-14 | End: 2022-02-14

## 2022-02-14 RX ORDER — SODIUM CHLORIDE 9 MG/ML
20 INJECTION, SOLUTION INTRAVENOUS ONCE
Status: CANCELLED | OUTPATIENT
Start: 2022-04-11

## 2022-02-14 RX ADMIN — IRON SUCROSE 200 MG: 20 INJECTION, SOLUTION INTRAVENOUS at 12:24

## 2022-02-14 RX ADMIN — SODIUM CHLORIDE 20 ML/HR: 9 INJECTION, SOLUTION INTRAVENOUS at 12:24

## 2022-02-14 NOTE — PROGRESS NOTES
Patient completed venofer infusion with no adverse reactions  PIV site removed, dressing CD&I  AVS provided, left unit ambulatory with steady gait

## 2022-03-01 ENCOUNTER — TELEPHONE (OUTPATIENT)
Dept: NEUROLOGY | Facility: CLINIC | Age: 57
End: 2022-03-01

## 2022-03-01 NOTE — TELEPHONE ENCOUNTER
Patient called in to reschedule her upcoming appointment for 3/16/22  I offered 6/15 at [facility] and 6/14 in [place]  I spoke w/MA Maurizio Hopson and asked if I can use the open botox slotes for 3/2 @ 9am  She advised she will call the patient and get them in sooner, but needs the 3/2 slots  Patient is aware the MA will be calling her back to get her rescheduled

## 2022-03-01 NOTE — TELEPHONE ENCOUNTER
Patient called back schedule appoint vital video with Dr Mojgan Esteves in [facility] on 3/02/2022

## 2022-03-03 ENCOUNTER — TELEMEDICINE (OUTPATIENT)
Dept: NEUROLOGY | Facility: CLINIC | Age: 57
End: 2022-03-03
Payer: COMMERCIAL

## 2022-03-03 VITALS — BODY MASS INDEX: 36.15 KG/M2 | SYSTOLIC BLOOD PRESSURE: 118 MMHG | DIASTOLIC BLOOD PRESSURE: 82 MMHG | WEIGHT: 224 LBS

## 2022-03-03 DIAGNOSIS — G25.0 TREMOR, ESSENTIAL: ICD-10-CM

## 2022-03-03 DIAGNOSIS — I67.841 REVERSIBLE CEREBROVASCULAR VASOCONSTRICTION SYNDROME: Primary | ICD-10-CM

## 2022-03-03 DIAGNOSIS — G44.89 OTHER HEADACHE SYNDROME: ICD-10-CM

## 2022-03-03 PROCEDURE — 99214 OFFICE O/P EST MOD 30 MIN: CPT | Performed by: PSYCHIATRY & NEUROLOGY

## 2022-03-03 PROCEDURE — 1036F TOBACCO NON-USER: CPT | Performed by: PSYCHIATRY & NEUROLOGY

## 2022-03-03 NOTE — ASSESSMENT & PLAN NOTE
Patient w/ essential tremor which is largely improved on her current primidone dosing   During last visit patient had just increased her primidone to 50 mg bid 4 days prior and was urged to continue on this - patient reports only one day of tremor bad enough to interfere with her eating, and attributes other worsening to her increased caffeine intake   Exam: mild, BUE fine high frequency low amplitude tremor    Plan:   Continue primidone 50 mg bid as tolerated   Decrease caffeine intake   F/u 4 mo   Can consider switching to topamax for both headache and tremor coverage if risk of interaction w/ amitriptyline acceptable

## 2022-03-03 NOTE — PROGRESS NOTES
Virtual Regular Visit    Verification of patient location:    Patient is located in the following state in which I hold an active license [place]      Assessment/Plan:    Problem List Items Addressed This Visit        Cardiovascular and Mediastinum    Reversible cerebrovascular vasoconstriction syndrome - Primary     Patient w/ prior RCVS 2/2 SSRIs who reports no new focal neurologic sx and no episodes of confusion  Remains stable on amitriptyline for mood     Patient endorses increased caffeine intake which has been likely contributing to tremor worsening and mild headaches   BP stable at most recent in office visits in chart    Plan:   Discussed importance of limiting caffeine intake, particularly in setting of prior RCVS and newly increasing HA frequency - patient to monitor HA while reducing caffeine intake   Can increase amitriptyline if needed for HA prophylaxis   Discussed continued avoidance of red meat, SNRIs, SSRIs   F/u 4 weeks for close monitoring            Nervous and Auditory    Tremor, essential     Patient w/ essential tremor which is largely improved on her current primidone dosing   During last visit patient had just increased her primidone to 50 mg bid 4 days prior and was urged to continue on this - patient reports only one day of tremor bad enough to interfere with her eating, and attributes other worsening to her increased caffeine intake   Exam: mild, BUE fine high frequency low amplitude tremor    Plan:   Continue primidone 50 mg bid as tolerated   Decrease caffeine intake   F/u 4 mo   Can consider switching to topamax for both headache and tremor coverage if risk of interaction w/ amitriptyline acceptable            Other    Other headache syndrome     Patient endorses more frequent, mild headaches which are not consistent w/ recurrence of her RCVS and may be more attributable to her increased caffeine intake   Patient to monitor while decreasing caffeine intake   Can consider increasing amitriptyline for HA prophylaxis if necessary   Previously considered topamax though there is some concern for interaction w/ amitriptyline                     Reason for visit is   Chief Complaint   Patient presents with    Cerebrovascular Accident    Virtual Regular Visit        Encounter provider Torrie Rock MD    Provider located at [address] [address]      Recent Visits  Date Type Provider Dept   03/01/22 Telephone [address] Rose recent visits within past 7 days and meeting all other requirements  Today's Visits  Date Type Provider Dept   03/03/22 Telemedicine Torrie Rock MD [facility]   Showing today's visits and meeting all other requirements  Future Appointments  No visits were found meeting these conditions  Showing future appointments within next 150 days and meeting all other requirements       The patient was identified by name and date of birth  Katherine Lopez was informed that this is a telemedicine visit and that the visit is being conducted through [address] and patient was informed this is a secure, HIPAA-complaint platform  She agrees to proceed     My office door was closed  The patient was notified the following individuals were present in the room : Dr Yani Amato and myself  She acknowledged consent and understanding of privacy and security of the video platform  The patient has agreed to participate and understands they can discontinue the visit at any time  Patient is aware this is a billable service  Subjective  Katherine Lopez is a 55 y o  female who presents today for follow-up   Patient presents today for follow-up of our CVS, tremor, headaches    She reports that since her last visit with Dr Yani Amato via telemedicine on 01/26/2022 she has not experienced any new focal neurologic deficits, no new weakness, no sensory deficits, no cognitive slowing  She notes that she has had mildly increased headaches, but these are nothing like the prior headache she experienced with her RCVS ("thunderclap")  She also admits that she has been having significantly increased caffeine intake and she believes that this may be contributing to her increased headaches as well as her intermittently worsened tremors  She reports that otherwise her tremors are largely well controlled with the current dosing of primidone 50 mg b i d  She reports she had 1 day of tremoring that was bad enough to interfere with her eating, but she is not quite sure what that is attributable to  She will monitor her symptoms closely including her headaches and tremors, and reduce her caffeine intake prior to her follow-up visit in 4 weeks  We reviewed the importance of limiting things like caffeine, red meat, SSRIs given her history of RCVS and we reiterated the importance of presenting immediately to the ED should she experience any symptoms suggestive of recurrence as this puts her at high risk for stroke    Patient otherwise Denies N/V/F/C, abdominal pain, dizziness, HA, visual changes, new weakness or sensory changes         Past Medical History:   Diagnosis Date    Anxiety     Hypertension     Reversible cerebrovascular vasoconstriction syndrome     Tremor        Past Surgical History:   Procedure Laterality Date    CHOLECYSTECTOMY      GASTRIC BYPASS  12/08/2000    for morbid obesity, last assessed 4/27/16       Current Outpatient Medications   Medication Sig Dispense Refill    amitriptyline (ELAVIL) 50 mg tablet TAKE 1 TABLET BY MOUTH EVERYDAY AT BEDTIME 90 tablet 1    B Complex Vitamins (VITAMIN B COMPLEX PO) Place under the tongue      B-D TB SYRINGE 1CC/25GX5/8" 25G X 5/8" 1 ML MISC USING 1 EVERY 14 DAYS      Blood Glucose Monitoring Suppl (FreeStyle Lite) MEGHA Testing twice daily 1 each 0    Cholecalciferol (VITAMIN D) 125 MCG (5000 UT) CAPS Take 1 tablet by mouth daily      cyanocobalamin 1,000 mcg/mL INJECT 1 ML (1,000 MCG TOTAL) INTO A MUSCLE EVERY 14 (FOURTEEN) DAYS 2 mL 5    glucose blood (FREESTYLE LITE) test strip TEST TWICE A DAY **DX:R73 9** 100 strip 5    glucose-vitamin C 4-0 006 g Chew 16 g      Lancets (freestyle) lancets Testing twice daily 100 each 5    lisinopril (ZESTRIL) 10 mg tablet TAKE 1 TABLET BY MOUTH EVERY DAY 30 tablet 5    LORazepam (ATIVAN) 2 mg tablet Take 0 5 tablets (1 mg total) by mouth 3 (three) times a day as needed for anxiety 1 tab  Tid, and 1/2-1 tab  Extra for panic attacks, or increased anxiety  90 tablet 2    Multiple Vitamin-Folic Acid TABS Take 1 tablet by mouth daily      primidone (MYSOLINE) 50 mg tablet TAKE 1 TABLET BY MOUTH EVERY DAY (Patient taking differently: Take 50 mg by mouth every 12 (twelve) hours  ) 90 tablet 2    sucralfate (CARAFATE) 1 g tablet Take 1 tablet (1 g total) by mouth every 6 (six) hours as needed (pain) 20 tablet 0    SYRINGE-NEEDLE, DISP, 3 ML (B-D 3CC LUER-FIOR SYR 25GX5/8") 25G X 5/8" 3 ML MISC Using 1 every 14 days 50 each 1    triamterene-hydrochlorothiazide (DYAZIDE) 37 5-25 mg per capsule TAKE 1 CAPSULE BY MOUTH EVERY DAY (Patient taking differently: No sig reported) 30 capsule 11     Current Facility-Administered Medications   Medication Dose Route Frequency Provider Last Rate Last Admin    cyanocobalamin injection 1,000 mcg  1,000 mcg Intramuscular Q30 Days Kb Faulkner MD   1,000 mcg at 12/23/20 3998        No Known Allergies    Review of Systems   Constitutional: Negative  Negative for appetite change and fever  HENT: Negative  Negative for hearing loss, tinnitus, trouble swallowing and voice change  Eyes: Negative  Negative for photophobia and pain  Respiratory: Negative  Negative for shortness of breath  Cardiovascular: Negative  Negative for palpitations  Gastrointestinal: Negative  Negative for nausea and vomiting     Endocrine: Negative  Negative for cold intolerance  Genitourinary: Negative  Negative for dysuria, frequency and urgency  Musculoskeletal: Negative  Negative for myalgias and neck pain  Skin: Negative  Negative for rash  Neurological: Positive for tremors and headaches  Negative for dizziness, seizures, syncope, facial asymmetry, speech difficulty, weakness, light-headedness and numbness  Patient stated she has headaches multiple times a week  Tremors in both hands every day   Hematological: Bruises/bleeds easily  Psychiatric/Behavioral: Negative  Negative for confusion, hallucinations and sleep disturbance  Video Exam    Vitals:    03/03/22 0939   BP: 118/82   Weight: 102 kg (224 lb)     Physical Exam no Vitals taken - virtual visit   Constitutional:      not in acute distress  Normal appearance  Not ill-appearing, toxic-appearing or diaphoretic  HENT:    Normocephalic and atraumatic  External ear normal b/l  Nose normal  No congestion or rhinorrhea  Mucous membranes are moist   Oropharynx is clear  No oropharyngeal exudate or posterior oropharyngeal erythema  Eyes:    No scleral icterus  No discharge  Conjunctivae normal    Pulmonary:    Pulmonary effort is normal  No respiratory distress  Musculoskeletal: no gross deformities  Skin:   Skin is not pale  Psychiatric:       Mood normal to mildly anxious    Neurologic Exam  Mental Status Alert     Oriented   Attention normal  Speech normal   Cranial Nerves  EOMI, no CN III palsy, no CN VI palsy  no nystagmus, Facial expression roughly symmetric, no dysarthria   Motor Exam Muscle bulk normal, Pronator drift absent b/l Strength limited exam 2/2 virtual visit  BUE fine hand tremor observed - high frequency, low amplitude, consistent w/ essential tremor  Sensory Exam limited exam 2/2 virtual visit    I spent 21 minutes directly with the patient during this visit    VIRTUAL VISIT [address] [place] [address] verbally agrees to participate in [employer]  Pt is aware that [employer] could be limited without vital signs or the ability to perform a full hands-on physical Aidee Overton understands she or the provider may request at any time to terminate the video visit and request the patient to seek care or treatment in person

## 2022-03-03 NOTE — ASSESSMENT & PLAN NOTE
Patient w/ prior RCVS 2/2 SSRIs who reports no new focal neurologic sx and no episodes of confusion  Remains stable on amitriptyline for mood     Patient endorses increased caffeine intake which has been likely contributing to tremor worsening and mild headaches   BP stable at most recent in office visits in chart    Plan:   Discussed importance of limiting caffeine intake, particularly in setting of prior RCVS and newly increasing HA frequency - patient to monitor HA while reducing caffeine intake   Can increase amitriptyline if needed for HA prophylaxis   Discussed continued avoidance of red meat, SNRIs, SSRIs   F/u 4 weeks for close monitoring

## 2022-03-03 NOTE — ASSESSMENT & PLAN NOTE
Patient endorses more frequent, mild headaches which are not consistent w/ recurrence of her RCVS and may be more attributable to her increased caffeine intake   Patient to monitor while decreasing caffeine intake   Can consider increasing amitriptyline for HA prophylaxis if necessary   Previously considered topamax though there is some concern for interaction w/ amitriptyline

## 2022-03-16 DIAGNOSIS — R73.09: ICD-10-CM

## 2022-03-16 DIAGNOSIS — I10 ESSENTIAL HYPERTENSION: ICD-10-CM

## 2022-03-16 DIAGNOSIS — E53.8 VITAMIN B12 DEFICIENCY: ICD-10-CM

## 2022-03-16 DIAGNOSIS — F41.9 ANXIETY: ICD-10-CM

## 2022-03-16 DIAGNOSIS — G25.0 TREMOR, ESSENTIAL: ICD-10-CM

## 2022-03-16 RX ORDER — LISINOPRIL 10 MG/1
TABLET ORAL
Qty: 90 TABLET | Refills: 1 | Status: SHIPPED | OUTPATIENT
Start: 2022-03-16 | End: 2022-07-08 | Stop reason: SDUPTHER

## 2022-03-17 DIAGNOSIS — G25.0 TREMOR, ESSENTIAL: ICD-10-CM

## 2022-03-17 RX ORDER — LORAZEPAM 2 MG/1
1 TABLET ORAL 3 TIMES DAILY PRN
Qty: 90 TABLET | Refills: 0 | Status: SHIPPED | OUTPATIENT
Start: 2022-03-17 | End: 2022-05-18

## 2022-03-17 RX ORDER — BLOOD-GLUCOSE METER
KIT MISCELLANEOUS
Qty: 100 STRIP | Refills: 0 | Status: SHIPPED | OUTPATIENT
Start: 2022-03-17 | End: 2022-07-08 | Stop reason: SDUPTHER

## 2022-03-18 RX ORDER — CYANOCOBALAMIN 1000 UG/ML
INJECTION INTRAMUSCULAR; SUBCUTANEOUS
Qty: 6 ML | Refills: 1 | Status: SHIPPED | OUTPATIENT
Start: 2022-03-18 | End: 2022-07-08 | Stop reason: SDUPTHER

## 2022-03-18 RX ORDER — PRIMIDONE 50 MG/1
50 TABLET ORAL 2 TIMES DAILY
Qty: 60 TABLET | Refills: 6 | Status: SHIPPED | OUTPATIENT
Start: 2022-03-18 | End: 2022-07-08 | Stop reason: SDUPTHER

## 2022-03-18 NOTE — TELEPHONE ENCOUNTER
patient of Dr Shalom Vizcarra, last office visit, 3/3, note documents: "Continue primidone 50 mg bid as tolerated",     please send new script if in agreement, (due for refill)  thank you

## 2022-03-21 RX ORDER — PRIMIDONE 50 MG/1
TABLET ORAL
Qty: 90 TABLET | Refills: 2 | Status: SHIPPED | OUTPATIENT
Start: 2022-03-21

## 2022-04-11 ENCOUNTER — HOSPITAL ENCOUNTER (OUTPATIENT)
Dept: INFUSION CENTER | Facility: HOSPITAL | Age: 57
Discharge: HOME | End: 2022-04-11
Attending: INTERNAL MEDICINE
Payer: COMMERCIAL

## 2022-04-11 VITALS
DIASTOLIC BLOOD PRESSURE: 87 MMHG | SYSTOLIC BLOOD PRESSURE: 139 MMHG | TEMPERATURE: 97.2 F | OXYGEN SATURATION: 100 % | HEART RATE: 63 BPM

## 2022-04-11 DIAGNOSIS — D50.8 IRON DEFICIENCY ANEMIA SECONDARY TO INADEQUATE DIETARY IRON INTAKE: Primary | ICD-10-CM

## 2022-04-11 PROCEDURE — 96365 THER/PROPH/DIAG IV INF INIT: CPT

## 2022-04-11 RX ORDER — SODIUM CHLORIDE 9 MG/ML
20 INJECTION, SOLUTION INTRAVENOUS ONCE
Status: COMPLETED | OUTPATIENT
Start: 2022-04-11 | End: 2022-04-11

## 2022-04-11 RX ORDER — SODIUM CHLORIDE 9 MG/ML
20 INJECTION, SOLUTION INTRAVENOUS ONCE
Status: CANCELLED | OUTPATIENT
Start: 2022-06-06

## 2022-04-11 RX ADMIN — SODIUM CHLORIDE 20 ML/HR: 9 INJECTION, SOLUTION INTRAVENOUS at 11:22

## 2022-04-11 RX ADMIN — IRON SUCROSE 200 MG: 20 INJECTION, SOLUTION INTRAVENOUS at 11:23

## 2022-04-11 NOTE — PROGRESS NOTES
Pt tolerated venofer infusion well without complication PIV removed and bandage applied  Next appt scheduled  Pt left ambulatory with steady gait for d/c to home

## 2022-05-10 ENCOUNTER — TELEMEDICINE (OUTPATIENT)
Dept: FAMILY MEDICINE CLINIC | Facility: HOSPITAL | Age: 57
End: 2022-05-10
Payer: COMMERCIAL

## 2022-05-10 VITALS — WEIGHT: 225 LBS | HEART RATE: 77 BPM | HEIGHT: 66 IN | OXYGEN SATURATION: 98 % | BODY MASS INDEX: 36.16 KG/M2

## 2022-05-10 DIAGNOSIS — I10 PRIMARY HYPERTENSION: Chronic | ICD-10-CM

## 2022-05-10 DIAGNOSIS — U07.1 COVID-19: Primary | ICD-10-CM

## 2022-05-10 DIAGNOSIS — G25.0 TREMOR, ESSENTIAL: ICD-10-CM

## 2022-05-10 PROCEDURE — 99213 OFFICE O/P EST LOW 20 MIN: CPT | Performed by: STUDENT IN AN ORGANIZED HEALTH CARE EDUCATION/TRAINING PROGRAM

## 2022-05-10 NOTE — PROGRESS NOTES
COVID-19 Outpatient Progress Note  Assessment/Plan:   Diagnosis ICD-10-CM Associated Orders   1  COVID-19  U07 1 molnupiravir 200 mg capsule      Oral antiviral sent to her pharmacy after discussion of side effects and drug interactions  Patient on primidone for her tremors, which has contraindications with Paxlovid  Instead of above medication ordered  Patient to call with any questions or concerns  Continue rest, adequate nutrition, and proper hydration    Continue using OTC zinc, vitamin-C, vitamin-D   Time spent 25 minutes  Encounter provider Lobito Lindsey DO    Provider located at [address] [place] [phone]    Recent Visits  No visits were found meeting these conditions  Showing recent visits within past 7 days and meeting all other requirements  Future Appointments  No visits were found meeting these conditions  Showing future appointments within next 150 days and meeting all other requirements     This virtual check-in was done via [employer] and patient was informed that this is a secure, HIPAA-compliant platform  She agrees to proceed  Patient agrees to participate in a virtual check in via telephone or video visit instead of presenting to the office to address urgent/immediate medical needs  Patient is aware this is a billable service  After connecting through [facility], the patient was identified by name and date of birth  Radha Leong was informed that this was a telemedicine visit and that the exam was being conducted confidentially over secure lines  My office door was closed  No one else was in the room  Radha Leong acknowledged consent and understanding of privacy and security of the telemedicine visit  I informed the patient that I have reviewed her record in Epic and presented the opportunity for her to ask any questions regarding the visit today   The patient agreed to participate  Verification of patient location:  Patient is located in the following state in which I hold an active license: [place]    Subjective:   Arelis Garay is a 55 y o  female who is concerned about COVID-19  Patient's symptoms include fatigue, nasal congestion, rhinorrhea and sore throat  Patient denies fever, chills, anosmia, loss of taste, cough, shortness of breath, chest tightness, nausea, vomiting, diarrhea, myalgias and headaches  - Date of symptom onset: 5/8/2022      COVID-19 vaccination status: Fully vaccinated with booster    Exposure:   Contact with a person who is under investigation (PUI) for or who is positive for COVID-19 within the last 14 days?: Yes    Hospitalized recently for fever and/or lower respiratory symptoms?: No      Currently a healthcare worker that is involved in direct patient care?: No      Works in a special setting where the risk of COVID-19 transmission may be high? (this may include long-term care, correctional and longterm facilities; homeless shelters; assisted-living facilities and group homes ): No      Resident in a special setting where the risk of COVID-19 transmission may be high? (this may include long-term care, correctional and longterm facilities; homeless shelters; assisted-living facilities and group homes ): No      Eating & drinking normally  Saw family for Mother's Day - 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 coughing  No work note needed  Thinks it may get worse       No results found for: Vivien Bull, [address] [facility], [address] [facility], [facility], [address] [facility], [address] [facility]  Past Medical History:   Diagnosis Date    Anxiety     Hypertension     Reversible cerebrovascular vasoconstriction syndrome     Tremor      Past Surgical History:   Procedure Laterality Date    CHOLECYSTECTOMY      GASTRIC BYPASS  12/08/2000    for morbid obesity, last assessed 4/27/16     Current Outpatient Medications   Medication Sig Dispense Refill    amitriptyline (ELAVIL) 50 mg tablet TAKE 1 TABLET BY MOUTH EVERYDAY AT BEDTIME 90 tablet 1    B Complex Vitamins (VITAMIN B COMPLEX PO) Place under the tongue      B-D TB SYRINGE 1CC/25GX5/8" 25G X 5/8" 1 ML MISC USING 1 EVERY 14 DAYS      Blood Glucose Monitoring Suppl (FreeStyle Lite) MEGHA Testing twice daily 1 each 0    Cholecalciferol (VITAMIN D) 125 MCG (5000 UT) CAPS Take 1 tablet by mouth daily      cyanocobalamin 1,000 mcg/mL INJECT 1 ML INTO THE MUSCLE EVERY 14 DAYS 6 mL 1    glucose blood (FREESTYLE LITE) test strip TEST TWICE A DAY **DX:R73 9** 100 strip 0    glucose-vitamin C 4-0 006 g Chew 16 g      Lancets (freestyle) lancets Testing twice daily 100 each 5    lisinopril (ZESTRIL) 10 mg tablet TAKE 1 TABLET BY MOUTH EVERY DAY 90 tablet 1    LORazepam (ATIVAN) 2 mg tablet TAKE 1/2 TABLET BY MOUTH 3 TIMES A DAY AS NEEDED FOR ANXIETY AND 1/2-1 TAB  EXTRA FOR PANIC ATTACKS, OR INCREASED ANXIETY  90 tablet 0    Multiple Vitamin-Folic Acid TABS Take 1 tablet by mouth daily      primidone (MYSOLINE) 50 mg tablet Take 1 tablet (50 mg total) by mouth 2 (two) times a day 60 tablet 6    primidone (MYSOLINE) 50 mg tablet TAKE 1 TABLET BY MOUTH EVERY DAY 90 tablet 2    sucralfate (CARAFATE) 1 g tablet Take 1 tablet (1 g total) by mouth every 6 (six) hours as needed (pain) 20 tablet 0    SYRINGE-NEEDLE, DISP, 3 ML (B-D 3CC LUER-FIOR SYR 25GX5/8") 25G X 5/8" 3 ML MISC Using 1 every 14 days 50 each 1    triamterene-hydrochlorothiazide (DYAZIDE) 37 5-25 mg per capsule TAKE 1 CAPSULE BY MOUTH EVERY DAY 90 capsule 3     Current Facility-Administered Medications   Medication Dose Route Frequency Provider Last Rate Last Admin    cyanocobalamin injection 1,000 mcg  1,000 mcg Intramuscular Q30 Days Albertina Clemons MD   1,000 mcg at 12/23/20 8526     No Known Allergies    Review of Systems   Constitutional: Positive for fatigue  Negative for chills and fever  HENT: Positive for congestion, rhinorrhea and sore throat      Respiratory: Negative for cough, chest tightness and shortness of breath  Gastrointestinal: Negative for diarrhea, nausea and vomiting  Musculoskeletal: Negative for myalgias  Neurological: Negative for headaches  Objective:    Vitals:    05/10/22 0950   Pulse: 77   SpO2: 98%   Weight: 102 kg (225 lb)   Height: 5' 6" (1 676 m)   Temp - 98 4    Physical Exam  Vitals reviewed  Constitutional:       General: She is not in acute distress  Appearance: She is normal weight  She is not ill-appearing  HENT:      Head: Normocephalic and atraumatic  Cardiovascular:      Heart sounds: No murmur heard  Pulmonary:      Effort: Pulmonary effort is normal  No respiratory distress  Musculoskeletal:      Cervical back: Normal range of motion  Neurological:      Mental Status: She is alert and oriented to person, place, and time  Psychiatric:         Mood and Affect: Mood normal          Behavior: Behavior normal          VIRTUAL VISIT DISCLAIMER    Georgesletitia Kulwant verbally agrees to participate in [employer]  Pt is aware that [employer] could be limited without vital signs or the ability to perform a full hands-on physical Faiht Flores understands she or the provider may request at any time to terminate the video visit and request the patient to seek care or treatment in person

## 2022-05-16 DIAGNOSIS — I10 ESSENTIAL HYPERTENSION: Chronic | ICD-10-CM

## 2022-05-16 DIAGNOSIS — F41.9 ANXIETY: ICD-10-CM

## 2022-05-16 RX ORDER — TRIAMTERENE AND HYDROCHLOROTHIAZIDE 37.5; 25 MG/1; MG/1
CAPSULE ORAL
Qty: 90 CAPSULE | Refills: 3 | Status: SHIPPED | OUTPATIENT
Start: 2022-05-16 | End: 2022-07-08 | Stop reason: SDUPTHER

## 2022-05-18 RX ORDER — LORAZEPAM 2 MG/1
TABLET ORAL
Qty: 90 TABLET | Refills: 0 | Status: SHIPPED | OUTPATIENT
Start: 2022-05-18 | End: 2022-07-08 | Stop reason: SDUPTHER

## 2022-05-26 ENCOUNTER — TELEPHONE (OUTPATIENT)
Dept: HEMATOLOGY ONCOLOGY | Facility: CLINIC | Age: 57
End: 2022-05-26

## 2022-05-26 NOTE — TELEPHONE ENCOUNTER
Reschedule Appointment     Who is calling in Patient    Doctor Appointment Scheduled with Dr Onel Can date and time 07/29 at [address] date and time 08/05 at 3:00pm   Location [address] [place] [address]   Reason for reschedule   Appt conflict

## 2022-05-27 ENCOUNTER — OFFICE VISIT (OUTPATIENT)
Dept: FAMILY MEDICINE CLINIC | Facility: HOSPITAL | Age: 57
End: 2022-05-27
Payer: COMMERCIAL

## 2022-05-27 VITALS
DIASTOLIC BLOOD PRESSURE: 78 MMHG | SYSTOLIC BLOOD PRESSURE: 120 MMHG | WEIGHT: 226 LBS | BODY MASS INDEX: 36.32 KG/M2 | OXYGEN SATURATION: 99 % | HEIGHT: 66 IN | HEART RATE: 66 BPM

## 2022-05-27 DIAGNOSIS — E66.09 OBESITY DUE TO EXCESS CALORIES WITHOUT SERIOUS COMORBIDITY, UNSPECIFIED CLASSIFICATION: Primary | ICD-10-CM

## 2022-05-27 DIAGNOSIS — F33.2 SEVERE EPISODE OF RECURRENT MAJOR DEPRESSIVE DISORDER, WITHOUT PSYCHOTIC FEATURES (HCC): ICD-10-CM

## 2022-05-27 DIAGNOSIS — I47.1 SUPRAVENTRICULAR TACHYCARDIA (HCC): ICD-10-CM

## 2022-05-27 PROCEDURE — 99213 OFFICE O/P EST LOW 20 MIN: CPT | Performed by: PHYSICIAN ASSISTANT

## 2022-05-27 PROCEDURE — 3008F BODY MASS INDEX DOCD: CPT | Performed by: PHYSICIAN ASSISTANT

## 2022-05-27 PROCEDURE — 1036F TOBACCO NON-USER: CPT | Performed by: PHYSICIAN ASSISTANT

## 2022-05-27 RX ORDER — PHENTERMINE HYDROCHLORIDE 15 MG/1
15 CAPSULE ORAL EVERY MORNING
Qty: 30 CAPSULE | Refills: 2 | Status: SHIPPED | OUTPATIENT
Start: 2022-05-27 | End: 2022-07-08 | Stop reason: SDUPTHER

## 2022-05-27 NOTE — PROGRESS NOTES
Assessment/Plan:       Problem List Items Addressed This Visit        Cardiovascular and Mediastinum       Other    Obesity due to excess calories - Primary (Chronic)    Relevant Medications    phentermine 15 MG capsule qd  Monitor blood pressure and pulse on this medication      Severe episode of recurrent major depressive disorder, without psychotic features (HCC)    Stable on current medications                Subjective:      Patient ID: Rose Varela is a 55 y o  female  Presents for follow up, had COVID on 5/10/2022, and was started on antivirals which helped  Now has a lingering cough, dry, "tickle" back of throat  Getting B12 injections every other week, and getting iron infusions regularly- every other month  Very frustrated with trouble losing weight  Parents will not accept help, son moving out, plans to live with girlfriend, daughter just finished 4st year college, and plans to spend her birthday with boyfriend  Going to [place] first week of June for vacation  Had a recent promotion, stressful but likes it  Admits to 2-3 cups of wine nightly  Monitoring glucose and blood pressure, usually WNL  Still trying to do the plant based diet, occasionally has cheese, and fish  Review of Systems   Constitutional: Negative for chills, diaphoresis, fatigue and fever  HENT: Positive for postnasal drip  Negative for congestion, ear pain, rhinorrhea and sore throat  Respiratory: Positive for cough  Negative for chest tightness and shortness of breath  Gastrointestinal: Negative for abdominal pain, constipation, diarrhea, nausea and vomiting  Rarely has abd  Pain  Psychiatric/Behavioral: Positive for dysphoric mood  Negative for self-injury, sleep disturbance and suicidal ideas  The patient is not nervous/anxious            Objective:      /78   Pulse 66   Ht 5' 6" (1 676 m)   Wt 103 kg (226 lb)   LMP 12/21/2016 (LMP Unknown)   SpO2 99%   BMI 36 48 kg/m²          Physical Exam  Vitals reviewed  Constitutional:       General: She is not in acute distress  Appearance: Normal appearance  She is not ill-appearing, toxic-appearing or diaphoretic  Cardiovascular:      Rate and Rhythm: Normal rate and regular rhythm  Pulses: Normal pulses  Heart sounds: Normal heart sounds  Pulmonary:      Effort: Pulmonary effort is normal  No respiratory distress  Breath sounds: Normal breath sounds  No stridor  No wheezing or rhonchi  Musculoskeletal:         General: No swelling, tenderness, deformity or signs of injury  Normal range of motion  Right lower leg: No edema  Left lower leg: No edema  Neurological:      General: No focal deficit present  Mental Status: She is alert and oriented to person, place, and time  Cranial Nerves: No cranial nerve deficit  Sensory: No sensory deficit  Motor: No weakness        Coordination: Coordination normal

## 2022-06-13 ENCOUNTER — HOSPITAL ENCOUNTER (OUTPATIENT)
Dept: INFUSION CENTER | Facility: HOSPITAL | Age: 57
Discharge: HOME | End: 2022-06-13
Attending: INTERNAL MEDICINE

## 2022-07-08 ENCOUNTER — OFFICE VISIT (OUTPATIENT)
Dept: FAMILY MEDICINE CLINIC | Facility: HOSPITAL | Age: 57
End: 2022-07-08
Payer: COMMERCIAL

## 2022-07-08 VITALS
SYSTOLIC BLOOD PRESSURE: 100 MMHG | RESPIRATION RATE: 16 BRPM | OXYGEN SATURATION: 99 % | DIASTOLIC BLOOD PRESSURE: 78 MMHG | WEIGHT: 224 LBS | HEIGHT: 66 IN | BODY MASS INDEX: 36 KG/M2 | HEART RATE: 70 BPM

## 2022-07-08 DIAGNOSIS — R10.13 EPIGASTRIC ABDOMINAL PAIN: ICD-10-CM

## 2022-07-08 DIAGNOSIS — G25.0 TREMOR, ESSENTIAL: ICD-10-CM

## 2022-07-08 DIAGNOSIS — Z00.00 ANNUAL PHYSICAL EXAM: Primary | ICD-10-CM

## 2022-07-08 DIAGNOSIS — I10 ESSENTIAL HYPERTENSION: Chronic | ICD-10-CM

## 2022-07-08 DIAGNOSIS — F33.41 RECURRENT MAJOR DEPRESSIVE DISORDER, IN PARTIAL REMISSION (HCC): ICD-10-CM

## 2022-07-08 DIAGNOSIS — Z12.31 ENCOUNTER FOR SCREENING MAMMOGRAM FOR MALIGNANT NEOPLASM OF BREAST: ICD-10-CM

## 2022-07-08 DIAGNOSIS — F41.9 ANXIETY: ICD-10-CM

## 2022-07-08 DIAGNOSIS — R73.09: ICD-10-CM

## 2022-07-08 DIAGNOSIS — E66.09 OBESITY DUE TO EXCESS CALORIES WITHOUT SERIOUS COMORBIDITY, UNSPECIFIED CLASSIFICATION: ICD-10-CM

## 2022-07-08 DIAGNOSIS — E53.8 VITAMIN B12 DEFICIENCY: ICD-10-CM

## 2022-07-08 PROCEDURE — 3725F SCREEN DEPRESSION PERFORMED: CPT | Performed by: PHYSICIAN ASSISTANT

## 2022-07-08 PROCEDURE — 99396 PREV VISIT EST AGE 40-64: CPT | Performed by: PHYSICIAN ASSISTANT

## 2022-07-08 RX ORDER — CYANOCOBALAMIN 1000 UG/ML
1000 INJECTION, SOLUTION INTRAMUSCULAR; SUBCUTANEOUS
Qty: 6 ML | Refills: 3 | Status: SHIPPED | OUTPATIENT
Start: 2022-07-08

## 2022-07-08 RX ORDER — SUCRALFATE 1 G/1
1 TABLET ORAL EVERY 6 HOURS PRN
Qty: 30 TABLET | Refills: 3 | Status: SHIPPED | OUTPATIENT
Start: 2022-07-08

## 2022-07-08 RX ORDER — TRIAMTERENE AND HYDROCHLOROTHIAZIDE 37.5; 25 MG/1; MG/1
CAPSULE ORAL
Qty: 90 CAPSULE | Refills: 3 | Status: SHIPPED | OUTPATIENT
Start: 2022-07-08

## 2022-07-08 RX ORDER — PRIMIDONE 50 MG/1
50 TABLET ORAL 2 TIMES DAILY
Qty: 60 TABLET | Refills: 6 | Status: SHIPPED | OUTPATIENT
Start: 2022-07-08 | End: 2022-08-04

## 2022-07-08 RX ORDER — AMITRIPTYLINE HYDROCHLORIDE 50 MG/1
50 TABLET, FILM COATED ORAL
Qty: 90 TABLET | Refills: 3 | Status: SHIPPED | OUTPATIENT
Start: 2022-07-08

## 2022-07-08 RX ORDER — BLOOD-GLUCOSE METER
KIT MISCELLANEOUS
Qty: 100 STRIP | Refills: 3 | Status: SHIPPED | OUTPATIENT
Start: 2022-07-08

## 2022-07-08 RX ORDER — SUCRALFATE 1 G/1
1 TABLET ORAL EVERY 6 HOURS PRN
Qty: 20 TABLET | Refills: 3 | Status: SHIPPED | OUTPATIENT
Start: 2022-07-08 | End: 2022-07-08 | Stop reason: SDUPTHER

## 2022-07-08 RX ORDER — LISINOPRIL 10 MG/1
10 TABLET ORAL DAILY
Qty: 90 TABLET | Refills: 3 | Status: SHIPPED | OUTPATIENT
Start: 2022-07-08

## 2022-07-08 RX ORDER — PHENTERMINE HYDROCHLORIDE 30 MG/1
30 CAPSULE ORAL EVERY MORNING
Qty: 30 CAPSULE | Refills: 3 | Status: SHIPPED | OUTPATIENT
Start: 2022-07-08

## 2022-07-08 RX ORDER — LORAZEPAM 2 MG/1
2 TABLET ORAL EVERY 8 HOURS PRN
Qty: 90 TABLET | Refills: 3 | Status: SHIPPED | OUTPATIENT
Start: 2022-07-08

## 2022-07-08 NOTE — PATIENT INSTRUCTIONS

## 2022-07-08 NOTE — PROGRESS NOTES
[address] [facility] [address]    NAME: Zarina Morris  AGE: 55 y o  SEX: female  : 1965     DATE: 2022     Assessment and Plan:     Problem List Items Addressed This Visit    None     Visit Diagnoses     Annual physical exam    -  Primary          Immunizations and preventive care screenings were discussed with patient today  Appropriate education was printed on patient's after visit summary  Counseling:  · Dental Health: discussed importance of regular tooth brushing, flossing, and dental visits  No follow-ups on file  Chief Complaint:     Chief Complaint   Patient presents with    Annual Exam      History of Present Illness:     Adult Annual Physical   Patient here for a comprehensive physical exam  The patient reports no problems  Diet and Physical Activity  · Diet/Nutrition: well balanced diet  · Exercise: walking  Depression Screening  PHQ-2/9 Depression Screening    Little interest or pleasure in doing things: 0 - not at all  Feeling down, depressed, or hopeless: 0 - not at all  Trouble falling or staying asleep, or sleeping too much: 0 - not at all  Feeling tired or having little energy: 3 - nearly every day  Poor appetite or overeatin - not at all  Feeling bad about yourself - or that you are a failure or have let yourself or your family down: 0 - not at all  Trouble concentrating on things, such as reading the newspaper or watching television: 0 - not at all  Moving or speaking so slowly that other people could have noticed  Or the opposite - being so fidgety or restless that you have been moving around a lot more than usual: 0 - not at all  Thoughts that you would be better off dead, or of hurting yourself in some way: 0 - not at all  PHQ-9 Score: 3   PHQ-9 Interpretation: No or Minimal depression        General Health  · Sleep: sleeps well     · Hearing: normal - bilateral   · Vision: goes for regular eye exams  · Dental: regular dental visits  /GYN Health  · Patient is: postmenopausal  · Last menstrual period: 2016  · Contraceptive method: none  Review of Systems:     Review of Systems   Constitutional: Negative for appetite change, chills, diaphoresis, fatigue and fever  Eyes: Negative for pain, discharge and visual disturbance  Respiratory: Negative for cough, chest tightness and shortness of breath  Cardiovascular: Negative for chest pain, palpitations and leg swelling  Gastrointestinal: Negative for abdominal pain, constipation, diarrhea, nausea and vomiting  Musculoskeletal: Negative for arthralgias, back pain, myalgias, neck pain and neck stiffness  Neurological: Negative for dizziness, tremors, weakness, light-headedness, numbness and headaches  Psychiatric/Behavioral: Negative for decreased concentration, dysphoric mood, self-injury, sleep disturbance and suicidal ideas  The patient is not nervous/anxious         Past Medical History:     Past Medical History:   Diagnosis Date    Anxiety     Hypertension     Reversible cerebrovascular vasoconstriction syndrome     Tremor       Past Surgical History:     Past Surgical History:   Procedure Laterality Date    CHOLECYSTECTOMY      GASTRIC BYPASS  12/08/2000    for morbid obesity, last assessed 4/27/16      Social History:     Social History     Socioeconomic History    Marital status: /Civil Union     Spouse name: None    Number of children: None    Years of education: None    Highest education level: None   Occupational History    None   Tobacco Use    Smoking status: Former Smoker     Types: Cigarettes    Smokeless tobacco: Never Used    Tobacco comment: social smoker, quit 2017 / never a smoker denied    Vaping Use    Vaping Use: Never used   Substance and Sexual Activity    Alcohol use: Yes     Comment: Social drinker per [employer]    Drug use: No    Sexual activity: None   Other Topics Concern    None   Social History Narrative    Wears seatbelt    Denies exercise habits    Good dental hygiene    Lives with friend     Living situation: supportive and safe     No advance directives     Social Determinants of Health     Financial Resource Strain: Not on file   Food Insecurity: Not on file   Transportation Needs: Not on file   Physical Activity: Not on file   Stress: Not on file   Social Connections: Not on file   Intimate Partner Violence: Not on file   Housing Stability: Not on file      Family History:     Family History   Problem Relation Age of Onset    Hypertension Mother     Diabetes Mother     Hypertension Father     Prostate cancer Father 80    Heart disease Brother     Heart attack Brother     Substance Abuse Brother     Heart failure Brother     Diabetes Brother     Hypertension Brother     Substance Abuse Sister     Substance Abuse Family     No Known Problems Daughter     No Known Problems Maternal Grandmother     No Known Problems Maternal Grandfather     No Known Problems Paternal Grandmother     No Known Problems Paternal Grandfather     No Known Problems Maternal Aunt     Mental illness Neg Hx         neg fam hx      Current Medications:     Current Outpatient Medications   Medication Sig Dispense Refill    amitriptyline (ELAVIL) 50 mg tablet TAKE 1 TABLET BY MOUTH EVERYDAY AT BEDTIME 90 tablet 1    B Complex Vitamins (VITAMIN B COMPLEX PO) Place under the tongue      B-D TB SYRINGE 1CC/25GX5/8" 25G X 5/8" 1 ML MISC USING 1 EVERY 14 DAYS      Blood Glucose Monitoring Suppl (FreeStyle Lite) MEGHA Testing twice daily 1 each 0    Cholecalciferol (VITAMIN D) 125 MCG (5000 UT) CAPS Take 1 tablet by mouth daily      cyanocobalamin 1,000 mcg/mL INJECT 1 ML INTO THE MUSCLE EVERY 14 DAYS 6 mL 1    glucose blood (FREESTYLE LITE) test strip TEST TWICE A DAY **DX:R73 9** 100 strip 0    glucose-vitamin C 4-0 006 g Chew 16 g Prn only      Lancets (freestyle) lancets Testing twice daily 100 each 5    lisinopril (ZESTRIL) 10 mg tablet TAKE 1 TABLET BY MOUTH EVERY DAY 90 tablet 1    LORazepam (ATIVAN) 2 mg tablet TAKE 1/2 TABLET BY MOUTH 3 TIMES A DAY AS NEEDED FOR ANXIETY AND 1/2-1 TAB  EXTRA FOR PANIC ATTACKS, OR INCREASED ANXIETY  90 tablet 0    Multiple Vitamin-Folic Acid TABS Take 1 tablet by mouth daily      phentermine 15 MG capsule Take 1 capsule (15 mg total) by mouth every morning 30 capsule 2    primidone (MYSOLINE) 50 mg tablet Take 1 tablet (50 mg total) by mouth 2 (two) times a day 60 tablet 6    sucralfate (CARAFATE) 1 g tablet Take 1 tablet (1 g total) by mouth every 6 (six) hours as needed (pain) 20 tablet 0    SYRINGE-NEEDLE, DISP, 3 ML (B-D 3CC LUER-FIOR SYR 25GX5/8") 25G X 5/8" 3 ML MISC Using 1 every 14 days 50 each 1    triamterene-hydrochlorothiazide (DYAZIDE) 37 5-25 mg per capsule TAKE 1 CAPSULE BY MOUTH EVERY DAY (Patient taking differently: 1 every other day) 90 capsule 3    primidone (MYSOLINE) 50 mg tablet TAKE 1 TABLET BY MOUTH EVERY DAY (Patient not taking: Reported on 7/8/2022) 90 tablet 2     Current Facility-Administered Medications   Medication Dose Route Frequency Provider Last Rate Last Admin    cyanocobalamin injection 1,000 mcg  1,000 mcg Intramuscular Q30 Days René Matta MD   1,000 mcg at 12/23/20 8752      Allergies:     No Known Allergies   Physical Exam:     /78   Pulse 70   Resp 16   Ht 5' 6" (1 676 m)   Wt 102 kg (224 lb)   LMP 12/21/2016 (LMP Unknown)   SpO2 99%   BMI 36 15 kg/m²     Physical Exam  Vitals reviewed  Constitutional:       General: She is not in acute distress  Appearance: Normal appearance  She is not ill-appearing, toxic-appearing or diaphoretic  HENT:      Head: Normocephalic and atraumatic  Right Ear: Tympanic membrane, ear canal and external ear normal  There is no impacted cerumen        Left Ear: Tympanic membrane, ear canal and external ear normal  There is no impacted cerumen  Nose: Nose normal  No congestion or rhinorrhea  Mouth/Throat:      Mouth: Mucous membranes are moist       Pharynx: No oropharyngeal exudate or posterior oropharyngeal erythema  Eyes:      General: No scleral icterus  Right eye: No discharge  Left eye: No discharge  Extraocular Movements: Extraocular movements intact  Conjunctiva/sclera: Conjunctivae normal    Cardiovascular:      Rate and Rhythm: Normal rate  Pulses: Normal pulses  Heart sounds: Normal heart sounds  Pulmonary:      Effort: Pulmonary effort is normal       Breath sounds: Normal breath sounds  Abdominal:      General: Bowel sounds are normal  There is no distension  Palpations: Abdomen is soft  There is no mass  Tenderness: There is no abdominal tenderness  There is no guarding or rebound  Hernia: No hernia is present  Musculoskeletal:         General: No swelling, tenderness, deformity or signs of injury  Normal range of motion  Right lower leg: No edema  Left lower leg: No edema  Neurological:      General: No focal deficit present  Mental Status: She is alert and oriented to person, place, and time  Cranial Nerves: No cranial nerve deficit  Sensory: No sensory deficit  Motor: No weakness  Coordination: Coordination normal    Psychiatric:         Mood and Affect: Mood normal          Behavior: Behavior normal          Thought Content:  Thought content normal          Judgment: Judgment normal           Juanita Arreola PA-C  [facility] [address]

## 2022-07-22 LAB
ALBUMIN SERPL-MCNC: 4.3 G/DL (ref 3.6–5.1)
ALBUMIN/GLOB SERPL: 2.4 (CALC) (ref 1–2.5)
ALP SERPL-CCNC: 52 U/L (ref 37–153)
ALT SERPL-CCNC: 16 U/L (ref 6–29)
AST SERPL-CCNC: 18 U/L (ref 10–35)
BASOPHILS # BLD AUTO: 21 CELLS/UL (ref 0–200)
BASOPHILS NFR BLD AUTO: 0.9 %
BILIRUB SERPL-MCNC: 0.5 MG/DL (ref 0.2–1.2)
BUN SERPL-MCNC: 6 MG/DL (ref 7–25)
BUN/CREAT SERPL: 9 (CALC) (ref 6–22)
CALCIUM SERPL-MCNC: 9 MG/DL (ref 8.6–10.4)
CHLORIDE SERPL-SCNC: 97 MMOL/L (ref 98–110)
CHOLEST SERPL-MCNC: 151 MG/DL
CHOLEST/HDLC SERPL: 2.4 (CALC)
CO2 SERPL-SCNC: 30 MMOL/L (ref 20–32)
CREAT SERPL-MCNC: 0.64 MG/DL (ref 0.5–1.03)
EOSINOPHIL # BLD AUTO: 9 CELLS/UL (ref 15–500)
EOSINOPHIL NFR BLD AUTO: 0.4 %
ERYTHROCYTE [DISTWIDTH] IN BLOOD BY AUTOMATED COUNT: 12.2 % (ref 11–15)
GFR/BSA.PRED SERPLBLD CYS-BASED-ARV: 104 ML/MIN/1.73M2
GLOBULIN SER CALC-MCNC: 1.8 G/DL (CALC) (ref 1.9–3.7)
GLUCOSE SERPL-MCNC: 79 MG/DL (ref 65–99)
HBA1C MFR BLD: 4.7 % OF TOTAL HGB
HCT VFR BLD AUTO: 39.8 % (ref 35–45)
HDLC SERPL-MCNC: 62 MG/DL
HGB BLD-MCNC: 13.5 G/DL (ref 11.7–15.5)
LDLC SERPL CALC-MCNC: 74 MG/DL (CALC)
LYMPHOCYTES # BLD AUTO: 964 CELLS/UL (ref 850–3900)
LYMPHOCYTES NFR BLD AUTO: 41.9 %
MAGNESIUM SERPL-MCNC: 1.8 MG/DL (ref 1.5–2.5)
MCH RBC QN AUTO: 29.9 PG (ref 27–33)
MCHC RBC AUTO-ENTMCNC: 33.9 G/DL (ref 32–36)
MCV RBC AUTO: 88.2 FL (ref 80–100)
MONOCYTES # BLD AUTO: 182 CELLS/UL (ref 200–950)
MONOCYTES NFR BLD AUTO: 7.9 %
NEUTROPHILS # BLD AUTO: 1125 CELLS/UL (ref 1500–7800)
NEUTROPHILS NFR BLD AUTO: 48.9 %
NONHDLC SERPL-MCNC: 89 MG/DL (CALC)
PLATELET # BLD AUTO: 169 THOUSAND/UL (ref 140–400)
PMV BLD REES-ECKER: 9.7 FL (ref 7.5–12.5)
POTASSIUM SERPL-SCNC: 4.1 MMOL/L (ref 3.5–5.3)
PROT SERPL-MCNC: 6.1 G/DL (ref 6.1–8.1)
RBC # BLD AUTO: 4.51 MILLION/UL (ref 3.8–5.1)
SODIUM SERPL-SCNC: 134 MMOL/L (ref 135–146)
TRIGL SERPL-MCNC: 72 MG/DL
TSH SERPL-ACNC: 2.17 MIU/L
VIT B12 SERPL-MCNC: 440 PG/ML (ref 200–1100)
WBC # BLD AUTO: 2.3 THOUSAND/UL (ref 3.8–10.8)

## 2022-07-27 LAB
FERRITIN SERPL-MCNC: 21 NG/ML (ref 16–232)
IRON SATN MFR SERPL: 31 % (CALC) (ref 16–45)
IRON SERPL-MCNC: 100 MCG/DL (ref 45–160)
METHYLMALONATE SERPL-SCNC: 115 NMOL/L (ref 87–318)
TIBC SERPL-MCNC: 321 MCG/DL (CALC) (ref 250–450)

## 2022-08-02 DIAGNOSIS — G25.0 TREMOR, ESSENTIAL: ICD-10-CM

## 2022-08-04 RX ORDER — PRIMIDONE 50 MG/1
TABLET ORAL
Qty: 60 TABLET | Refills: 6 | Status: SHIPPED | OUTPATIENT
Start: 2022-08-04 | End: 2022-09-12

## 2022-08-05 ENCOUNTER — OFFICE VISIT (OUTPATIENT)
Dept: HEMATOLOGY ONCOLOGY | Facility: HOSPITAL | Age: 57
End: 2022-08-05
Payer: COMMERCIAL

## 2022-08-05 VITALS
BODY MASS INDEX: 36.32 KG/M2 | WEIGHT: 226 LBS | OXYGEN SATURATION: 98 % | TEMPERATURE: 97.8 F | SYSTOLIC BLOOD PRESSURE: 124 MMHG | RESPIRATION RATE: 14 BRPM | HEART RATE: 78 BPM | HEIGHT: 66 IN | DIASTOLIC BLOOD PRESSURE: 80 MMHG

## 2022-08-05 DIAGNOSIS — D50.8 IRON DEFICIENCY ANEMIA SECONDARY TO INADEQUATE DIETARY IRON INTAKE: Primary | ICD-10-CM

## 2022-08-05 PROCEDURE — 99214 OFFICE O/P EST MOD 30 MIN: CPT | Performed by: INTERNAL MEDICINE

## 2022-08-05 RX ORDER — SODIUM CHLORIDE 9 MG/ML
20 INJECTION, SOLUTION INTRAVENOUS ONCE
Status: CANCELLED | OUTPATIENT
Start: 2022-08-05

## 2022-08-05 NOTE — PROGRESS NOTES
Hematology/Oncology Outpatient Follow- up Note  Zarina Morris 55 y o  female MRN: @ Encounter: [id number]        Date:  8/5/2022    Presenting Complaint/Diagnosis :     Low white count with mild neutropenia and slightly low  Eosinophils since 2019    HPI:      Vikki Ahumada seen for initial consultation 3/31/2021 regarding  An isolated low white count since 2019   Looking through the blood work all the way back to 2019 the patient's white count has fluctuated between 2 5-3 0 with a normal hemoglobin and normal platelet count   The patient's neutrophils have fluctuated between a 1095 and normal   The most recent 1 was 1238   Patient herself has had no issues with frequent infections or the need for antibiotics  She does have a history of a gastric bypass and has been iron deficient in the past and was seen years ago for Venofer   She currently gives herself a B12 shot once a month  Previous Hematologic/ Oncologic History:      Venofer every 2 months but the patient discontinue secondary to co-pay and deductible issues    Current Hematologic/ Oncologic Treatment:      Oral supplement    Interval History:    The patient returns for follow-up visit  She gives herself a B12 shot every 2 weeks  She is also on a B complex  I had prescribed Venofer but the patient has not been getting this after the 1st 2 doses as it cost 500 dollars each time secondary to her deductible which is quite high  Based on this she is taking oral supplementation per her wishes  Her most recent blood work shows a white count of 2 3 with a hemoglobin of 13 5 and a platelet count of 058  MCV was normal at 88 2  Serum iron was 100 with an iron saturation of 31%  TIBC was 321  MMA was 115 with a ferritin of 21  Patient herself is at baseline  Denies any frequent infections or any issues  Denies any nausea or vomiting  Her 14 point review of systems today was otherwise negative        Test Results:    Imaging: No results found  Labs:   Lab Results   Component Value Date    WBC 2 3 (L) 07/22/2022    HGB 13 5 07/22/2022    HCT 39 8 07/22/2022    MCV 88 2 07/22/2022     07/22/2022     Lab Results   Component Value Date     06/18/2015    K 4 1 07/22/2022    CL 97 (L) 07/22/2022    CO2 30 07/22/2022    ANIONGAP 8 06/18/2015    BUN 6 (L) 07/22/2022    CREATININE 0 64 07/22/2022    GLUCOSE 142 (H) 06/18/2015    GLUF 134 (H) 01/23/2018    CALCIUM 9 0 07/22/2022    AST 18 07/22/2022    ALT 16 07/22/2022    ALKPHOS 52 07/22/2022    EGFR 104 07/22/2022     Lab Results   Component Value Date    IRON 100 07/22/2022    TIBC 321 07/22/2022    FERRITIN 21 07/22/2022       Lab Results   Component Value Date    [id number] 440 07/22/2022         ROS: As stated in the history of present illness otherwise his 14 point review of systems today was negative        Active Problems:   Patient Active Problem List   Diagnosis    Hypertension    History of gastric bypass    Obesity due to excess calories    Reversible cerebrovascular vasoconstriction syndrome    Other headache syndrome    Tremor, essential    Anxiety    Recurrent major depressive disorder, in partial remission (HCC)    Supraventricular tachycardia (HCC)    Severe episode of recurrent major depressive disorder, without psychotic features [facility]    Myeloproliferative disease ([facility]    Iron deficiency anemia secondary to inadequate dietary iron intake       Past Medical History:   Past Medical History:   Diagnosis Date    Anxiety     Hypertension     Reversible cerebrovascular vasoconstriction syndrome     Tremor        Surgical History:   Past Surgical History:   Procedure Laterality Date    CHOLECYSTECTOMY      GASTRIC BYPASS  12/08/2000    for morbid obesity, last assessed 4/27/16       Family History:    Family History   Problem Relation Age of Onset    Hypertension Mother     Diabetes Mother     Hypertension Father     Prostate cancer Father 80    Heart disease Brother     Heart attack Brother     Substance Abuse Brother     Heart failure Brother     Diabetes Brother     Hypertension Brother     Substance Abuse Sister     Substance Abuse Family     No Known Problems Daughter     No Known Problems Maternal Grandmother     No Known Problems Maternal Grandfather     No Known Problems Paternal Grandmother     No Known Problems Paternal Grandfather     No Known Problems Maternal Aunt     Mental illness Neg Hx         neg fam hx       Cancer-related family history includes Prostate cancer (age of onset: 80) in her father      Social History:   Social History     Socioeconomic History    Marital status: /Civil Union     Spouse name: Not on file    Number of children: Not on file    Years of education: Not on file    Highest education level: Not on file   Occupational History    Not on file   Tobacco Use    Smoking status: Former Smoker     Types: Cigarettes    Smokeless tobacco: Never Used    Tobacco comment: social smoker, quit 2017 / never a smoker denied    Vaping Use    Vaping Use: Never used   Substance and Sexual Activity    Alcohol use: Yes     Comment: Social drinker per [employer]    Drug use: No    Sexual activity: Not on file   Other Topics Concern    Not on file   Social History Narrative    Wears seatbelt    Denies exercise habits    Good dental hygiene    Lives with friend     Living situation: supportive and safe     No advance directives     Social Determinants of Health     Financial Resource Strain: Not on file   Food Insecurity: Not on file   Transportation Needs: Not on file   Physical Activity: Not on file   Stress: Not on file   Social Connections: Not on file   Intimate Partner Violence: Not on file   Housing Stability: Not on file       Current Medications:   Current Outpatient Medications   Medication Sig Dispense Refill    amitriptyline (ELAVIL) 50 mg tablet Take 1 tablet (50 mg total) by mouth daily at bedtime 90 tablet 3    B Complex Vitamins (VITAMIN B COMPLEX PO) Place under the tongue      B-D TB SYRINGE 1CC/25GX5/8" 25G X 5/8" 1 ML MISC USING 1 EVERY 14 DAYS      Blood Glucose Monitoring Suppl (FreeStyle Lite) MEGHA Testing twice daily 1 each 0    Cholecalciferol (VITAMIN D) 125 MCG (5000 UT) CAPS Take 1 tablet by mouth daily      cyanocobalamin 1,000 mcg/mL Inject 1 mL (1,000 mcg total) under the skin every 14 (fourteen) days 6 mL 3    glucose blood (FREESTYLE LITE) test strip TEST TWICE A DAY **DX:R73 9** 100 strip 3    glucose-vitamin C 4-0 006 g Chew 16 g Prn only      Lancets (freestyle) lancets Testing twice daily 100 each 5    lisinopril (ZESTRIL) 10 mg tablet Take 1 tablet (10 mg total) by mouth daily 90 tablet 3    LORazepam (ATIVAN) 2 mg tablet Take 1 tablet (2 mg total) by mouth every 8 (eight) hours as needed for anxiety 90 tablet 3    Multiple Vitamin-Folic Acid TABS Take 1 tablet by mouth daily      phentermine 30 MG capsule Take 1 capsule (30 mg total) by mouth every morning 30 capsule 3    primidone (MYSOLINE) 50 mg tablet TAKE 1 TABLET BY MOUTH TWICE A DAY 60 tablet 6    sucralfate (CARAFATE) 1 g tablet Take 1 tablet (1 g total) by mouth every 6 (six) hours as needed (pain) 30 tablet 3    SYRINGE-NEEDLE, DISP, 3 ML (B-D 3CC LUER-FIOR SYR 25GX5/8") 25G X 5/8" 3 ML MISC Using 1 every 14 days 50 each 1    triamterene-hydrochlorothiazide (DYAZIDE) 37 5-25 mg per capsule 1 every other day 90 capsule 3    primidone (MYSOLINE) 50 mg tablet TAKE 1 TABLET BY MOUTH EVERY DAY (Patient not taking: Reported on 8/5/2022) 90 tablet 2     Current Facility-Administered Medications   Medication Dose Route Frequency Provider Last Rate Last Admin    cyanocobalamin injection 1,000 mcg  1,000 mcg Intramuscular Q30 Days Linda Haddad MD   1,000 mcg at 12/23/20 4668       Allergies: No Known Allergies    Physical Exam:    Body surface area is 2 11 meters squared      Wt Readings from Last 3 Encounters: 08/05/22 103 kg (226 lb)   07/08/22 102 kg (224 lb)   05/27/22 103 kg (226 lb)        Temp Readings from Last 3 Encounters:   08/05/22 97 8 °F (36 6 °C) (Temporal)   04/11/22 (!) 97 2 °F (36 2 °C) (Temporal)   02/14/22 97 8 °F (36 6 °C) (Temporal)        BP Readings from Last 3 Encounters:   08/05/22 124/80   07/08/22 100/78   05/27/22 120/78         Pulse Readings from Last 3 Encounters:   08/05/22 78   07/08/22 70   05/27/22 66     Physical Exam     Constitutional   General appearance: No acute distress, well appearing and well nourished  Eyes   Conjunctiva and lids: No swelling, erythema or discharge  Pupils and irises: Equal, round and reactive to light  Ears, Nose, Mouth, and Throat   External inspection of ears and nose: Normal     Nasal mucosa, septum, and turbinates: Normal without edema or erythema  Oropharynx: Normal with no erythema, edema, exudate or lesions  Pulmonary   Respiratory effort: No increased work of breathing or signs of respiratory distress  Auscultation of lungs: Clear to auscultation  Cardiovascular   Palpation of heart: Normal PMI, no thrills  Auscultation of heart: Normal rate and rhythm, normal S1 and S2, without murmurs  Examination of extremities for edema and/or varicosities: Normal     Carotid pulses: Normal     Abdomen   Abdomen: Non-tender, no masses  Liver and spleen: No hepatomegaly or splenomegaly  Lymphatic   Palpation of lymph nodes in neck: No lymphadenopathy  Musculoskeletal   Gait and station: Normal     Digits and nails: Normal without clubbing or cyanosis  Inspection/palpation of joints, bones, and muscles: Normal     Skin   Skin and subcutaneous tissue: Normal without rashes or lesions  Neurologic   Cranial nerves: Cranial nerves 2-12 intact  Sensation: No sensory loss      Psychiatric   Orientation to person, place, and time: Normal     Mood and affect: Normal         Assessment / Plan:      The patient is a pleasant 67-year-old female who was referred to see us for slightly low white count with neutropenia for at least 2-3 years   The rest of her blood counts have remained within normal limits   She does have history of gastric bypass and B12 deficiency for which she is on B12 injections  We had a discussion that based on the chronicity of her mild leukopenia in combination with normal hemoglobin and platelets I doubt this is a leukemia   Possibilities include mild MDS versus medication induced   Her most recent MMA is normal   T-cell gene rearrangements studies were negative indicating she does not have LGL   CLL/ lymphoma diagnostic panel was negative for any evidence of leukemia I e  the flow cytometry  We put her on Venofer every 2 months because of her history of gastric bypass  She only got 2 doses but then refused any further secondary to the cost   I explained I suspect her fluctuating white count was secondary to 1 of the above etiologies but since it is not critical and she is asymptomatic observation is reasonable  Most recent ferritin was on the low side of normal at 21 with a normal MMA and iron studies were otherwise normal   White count was stable at 2 3 with a hemoglobin of 13 5 and a platelet count of 920  ANC was still above a 1000 at 11:25 a m   It has fluctuated to this range in the past also  Patient is asymptomatic  I think observation is reasonable  I will see her back in 6 months  She will take oral iron instead of the Venofer which she does not wish to take  She has any questions she will call our office  Goals and Barriers:  Current Goal:  Prolong Survival from iron deficiency and neutropenia  Barriers: None  Patient's Capacity to Self Care:  Patient  able to self care  Portions of the record may have been created with voice recognition software    Occasional wrong word or "sound a like" substitutions may have occurred due to the inherent limitations of voice recognition software  Read the chart carefully and recognize, using context, where substitutions have occurred

## 2022-09-06 ENCOUNTER — TELEPHONE (OUTPATIENT)
Dept: NEUROLOGY | Facility: CLINIC | Age: 57
End: 2022-09-06

## 2022-09-06 NOTE — TELEPHONE ENCOUNTER
Called patient LVM to patient to call back to reschedule upcoming appointment do to provider will be out of office

## 2022-09-14 NOTE — TELEPHONE ENCOUNTER
Patient called back from her vacation  Rescheduled her appointment to 10/5/22 with Dr Farfan Matter  She would like to keep it virtual  Patient has My chart

## 2022-10-05 ENCOUNTER — TELEMEDICINE (OUTPATIENT)
Dept: NEUROLOGY | Facility: CLINIC | Age: 57
End: 2022-10-05
Payer: COMMERCIAL

## 2022-10-05 DIAGNOSIS — I67.841 REVERSIBLE CEREBROVASCULAR VASOCONSTRICTION SYNDROME: Primary | ICD-10-CM

## 2022-10-05 DIAGNOSIS — G25.0 TREMOR, ESSENTIAL: ICD-10-CM

## 2022-10-05 PROCEDURE — 99213 OFFICE O/P EST LOW 20 MIN: CPT | Performed by: PSYCHIATRY & NEUROLOGY

## 2022-10-05 NOTE — PROGRESS NOTES
Virtual Regular Visit    Verification of patient location: [place]    Patient is located in the following state in which I hold an active license [place]      Assessment/Plan:    Problem List Items Addressed This Visit        Cardiovascular and Mediastinum    Reversible cerebrovascular vasoconstriction syndrome - Primary     Patient is doing well   She is still avoiding red wine, red meat, serotonergic agents and caffeine  Denies any headaches or any other symptoms  Continue with amitriptyline 50 mg at bedtime            Nervous and Auditory    Tremor, essential     States he has some good days and bad days  Recommend primidone 50 mg daily  Reason for visit is   Chief Complaint   Patient presents with   • Virtual Regular Visit        Encounter provider Dario Castorena MD    Provider located at [address] [place] [id number]      Recent Visits  Date Type Provider Dept   10/05/22 Telemedicine Dario Castorena MD [facility] recent visits within past 7 days and meeting all other requirements  Future Appointments  No visits were found meeting these conditions  Showing future appointments within next 150 days and meeting all other requirements       The patient was identified by name and date of birth  Arthur Mariscal was informed that this is a telemedicine visit and that the visit is being conducted through [address] [place] [address] Now and patient was informed that this is a secure, HIPAA-compliant platform  She agrees to proceed     My office door was closed  No one else was in the room  She acknowledged consent and understanding of privacy and security of the video platform  The patient has agreed to participate and understands they can discontinue the visit at any time  Patient is aware this is a billable service       Subjective  Arthur Mariscal is a 55 y o  female who is a follow-up of history of [facility] and essential tremor  She states that she does not have any headaches or any other symptoms from a CV standpoint  She is still avoiding the agency needed to be a bleeding  She states that as far as her tremor is concerned she does have some good days and bad days and does seem to be tolerating the primidone 50 mg daily  She does occasionally drop things but overall she seems to notice that it has been helping        HPI     Past Medical History:   Diagnosis Date   • Anxiety    • Hypertension    • Reversible cerebrovascular vasoconstriction syndrome    • Tremor        Past Surgical History:   Procedure Laterality Date   • CHOLECYSTECTOMY     • GASTRIC BYPASS  12/08/2000    for morbid obesity, last assessed 4/27/16       Current Outpatient Medications   Medication Sig Dispense Refill   • amitriptyline (ELAVIL) 50 mg tablet Take 1 tablet (50 mg total) by mouth daily at bedtime 90 tablet 3   • B Complex Vitamins (VITAMIN B COMPLEX PO) Place under the tongue     • B-D TB SYRINGE 1CC/25GX5/8" 25G X 5/8" 1 ML MISC USING 1 EVERY 14 DAYS     • Blood Glucose Monitoring Suppl (FreeStyle Lite) MEGHA Testing twice daily 1 each 0   • Cholecalciferol (VITAMIN D) 125 MCG (5000 UT) CAPS Take 1 tablet by mouth daily     • cyanocobalamin 1,000 mcg/mL Inject 1 mL (1,000 mcg total) under the skin every 14 (fourteen) days 6 mL 3   • glucose blood (FREESTYLE LITE) test strip TEST TWICE A DAY **DX:R73 9** 100 strip 3   • glucose-vitamin C 4-0 006 g Chew 16 g Prn only     • Lancets (freestyle) lancets Testing twice daily 100 each 5   • lisinopril (ZESTRIL) 10 mg tablet Take 1 tablet (10 mg total) by mouth daily 90 tablet 3   • LORazepam (ATIVAN) 2 mg tablet Take 1 tablet (2 mg total) by mouth every 8 (eight) hours as needed for anxiety 90 tablet 3   • Multiple Vitamin-Folic Acid TABS Take 1 tablet by mouth daily     • phentermine 30 MG capsule Take 1 capsule (30 mg total) by mouth every morning 30 capsule 3   • primidone (MYSOLINE) 50 mg tablet TAKE 1 TABLET BY MOUTH TWICE A  tablet 3   • sucralfate (CARAFATE) 1 g tablet Take 1 tablet (1 g total) by mouth every 6 (six) hours as needed (pain) 30 tablet 3   • SYRINGE-NEEDLE, DISP, 3 ML (B-D 3CC LUER-FIOR SYR 25GX5/8") 25G X 5/8" 3 ML MISC Using 1 every 14 days 50 each 1   • triamterene-hydrochlorothiazide (DYAZIDE) 37 5-25 mg per capsule 1 every other day 90 capsule 3   • primidone (MYSOLINE) 50 mg tablet TAKE 1 TABLET BY MOUTH EVERY DAY (Patient not taking: Reported on 8/5/2022) 90 tablet 2     Current Facility-Administered Medications   Medication Dose Route Frequency Provider Last Rate Last Admin   • cyanocobalamin injection 1,000 mcg  1,000 mcg Intramuscular Q30 Days Sanjana Muir MD   1,000 mcg at 12/23/20 4004        No Known Allergies    Review of Systems   Constitutional: Negative  Negative for appetite change and fever  HENT: Negative  Negative for hearing loss, tinnitus, trouble swallowing and voice change  Eyes: Negative  Negative for photophobia and pain  Respiratory: Negative  Negative for shortness of breath  Cardiovascular: Negative  Negative for palpitations  Gastrointestinal: Negative  Negative for nausea and vomiting  Endocrine: Negative  Negative for cold intolerance  Genitourinary: Negative  Negative for dysuria, frequency and urgency  Musculoskeletal: Negative  Negative for myalgias and neck pain  Skin: Negative  Negative for rash  Neurological: Negative  Negative for dizziness, tremors, seizures, syncope, facial asymmetry, speech difficulty, weakness, light-headedness, numbness and headaches  Hematological: Negative  Does not bruise/bleed easily  Psychiatric/Behavioral: Negative  Negative for confusion, hallucinations and sleep disturbance  Video Exam    There were no vitals filed for this visit      Physical Exam     I spent 10 minutes directly with the patient during this visit

## 2022-10-10 NOTE — ASSESSMENT & PLAN NOTE
Patient is doing well   She is still avoiding red wine, red meat, serotonergic agents and caffeine  Denies any headaches or any other symptoms  Continue with amitriptyline 50 mg at bedtime

## 2022-11-14 DIAGNOSIS — F41.9 ANXIETY: ICD-10-CM

## 2022-11-14 RX ORDER — LORAZEPAM 2 MG/1
2 TABLET ORAL EVERY 8 HOURS PRN
Qty: 90 TABLET | Refills: 0 | Status: SHIPPED | OUTPATIENT
Start: 2022-11-14

## 2023-01-26 NOTE — ASSESSMENT & PLAN NOTE
Avoid unnecessary strain, BM Rx ordered breath sounds clear and equal bilaterally. No w/r/r. Equal chest expansion and rise.

## 2023-01-27 LAB
ALBUMIN SERPL-MCNC: 4.3 G/DL (ref 3.6–5.1)
ALBUMIN/GLOB SERPL: 2 (CALC) (ref 1–2.5)
ALP SERPL-CCNC: 48 U/L (ref 37–153)
ALT SERPL-CCNC: 15 U/L (ref 6–29)
AST SERPL-CCNC: 24 U/L (ref 10–35)
BASOPHILS # BLD AUTO: 20 CELLS/UL (ref 0–200)
BASOPHILS NFR BLD AUTO: 0.7 %
BILIRUB SERPL-MCNC: 0.5 MG/DL (ref 0.2–1.2)
BUN SERPL-MCNC: 14 MG/DL (ref 7–25)
BUN/CREAT SERPL: ABNORMAL (CALC) (ref 6–22)
CALCIUM SERPL-MCNC: 9.4 MG/DL (ref 8.6–10.4)
CHLORIDE SERPL-SCNC: 101 MMOL/L (ref 98–110)
CO2 SERPL-SCNC: 34 MMOL/L (ref 20–32)
CREAT SERPL-MCNC: 0.71 MG/DL (ref 0.5–1.03)
EOSINOPHIL # BLD AUTO: 0 CELLS/UL (ref 15–500)
EOSINOPHIL NFR BLD AUTO: 0 %
ERYTHROCYTE [DISTWIDTH] IN BLOOD BY AUTOMATED COUNT: 11.7 % (ref 11–15)
FERRITIN SERPL-MCNC: 23 NG/ML (ref 16–232)
GFR/BSA.PRED SERPLBLD CYS-BASED-ARV: 100 ML/MIN/1.73M2
GLOBULIN SER CALC-MCNC: 2.1 G/DL (CALC) (ref 1.9–3.7)
GLUCOSE SERPL-MCNC: 83 MG/DL (ref 65–99)
HCT VFR BLD AUTO: 39.5 % (ref 35–45)
HGB BLD-MCNC: 13 G/DL (ref 11.7–15.5)
IRON SATN MFR SERPL: 35 % (CALC) (ref 16–45)
IRON SERPL-MCNC: 117 MCG/DL (ref 45–160)
LYMPHOCYTES # BLD AUTO: 1028 CELLS/UL (ref 850–3900)
LYMPHOCYTES NFR BLD AUTO: 36.7 %
MCH RBC QN AUTO: 29.5 PG (ref 27–33)
MCHC RBC AUTO-ENTMCNC: 32.9 G/DL (ref 32–36)
MCV RBC AUTO: 89.8 FL (ref 80–100)
MONOCYTES # BLD AUTO: 283 CELLS/UL (ref 200–950)
MONOCYTES NFR BLD AUTO: 10.1 %
NEUTROPHILS # BLD AUTO: 1470 CELLS/UL (ref 1500–7800)
NEUTROPHILS NFR BLD AUTO: 52.5 %
PLATELET # BLD AUTO: 178 THOUSAND/UL (ref 140–400)
PMV BLD REES-ECKER: 9.7 FL (ref 7.5–12.5)
POTASSIUM SERPL-SCNC: 4.1 MMOL/L (ref 3.5–5.3)
PROT SERPL-MCNC: 6.4 G/DL (ref 6.1–8.1)
RBC # BLD AUTO: 4.4 MILLION/UL (ref 3.8–5.1)
SODIUM SERPL-SCNC: 141 MMOL/L (ref 135–146)
TIBC SERPL-MCNC: 337 MCG/DL (CALC) (ref 250–450)
WBC # BLD AUTO: 2.8 THOUSAND/UL (ref 3.8–10.8)

## 2023-01-30 LAB
ALBUMIN SERPL-MCNC: 4.3 G/DL (ref 3.6–5.1)
ALBUMIN/GLOB SERPL: 2 (CALC) (ref 1–2.5)
ALP SERPL-CCNC: 48 U/L (ref 37–153)
ALT SERPL-CCNC: 15 U/L (ref 6–29)
AST SERPL-CCNC: 24 U/L (ref 10–35)
BASOPHILS # BLD AUTO: 20 CELLS/UL (ref 0–200)
BASOPHILS NFR BLD AUTO: 0.7 %
BILIRUB SERPL-MCNC: 0.5 MG/DL (ref 0.2–1.2)
BUN SERPL-MCNC: 14 MG/DL (ref 7–25)
BUN/CREAT SERPL: ABNORMAL (CALC) (ref 6–22)
CALCIUM SERPL-MCNC: 9.4 MG/DL (ref 8.6–10.4)
CHLORIDE SERPL-SCNC: 101 MMOL/L (ref 98–110)
CO2 SERPL-SCNC: 34 MMOL/L (ref 20–32)
CREAT SERPL-MCNC: 0.71 MG/DL (ref 0.5–1.03)
EOSINOPHIL # BLD AUTO: 0 CELLS/UL (ref 15–500)
EOSINOPHIL NFR BLD AUTO: 0 %
ERYTHROCYTE [DISTWIDTH] IN BLOOD BY AUTOMATED COUNT: 11.7 % (ref 11–15)
FERRITIN SERPL-MCNC: 23 NG/ML (ref 16–232)
GFR/BSA.PRED SERPLBLD CYS-BASED-ARV: 100 ML/MIN/1.73M2
GLOBULIN SER CALC-MCNC: 2.1 G/DL (CALC) (ref 1.9–3.7)
GLUCOSE SERPL-MCNC: 83 MG/DL (ref 65–99)
HCT VFR BLD AUTO: 39.5 % (ref 35–45)
HGB BLD-MCNC: 13 G/DL (ref 11.7–15.5)
IRON SATN MFR SERPL: 35 % (CALC) (ref 16–45)
IRON SERPL-MCNC: 117 MCG/DL (ref 45–160)
LYMPHOCYTES # BLD AUTO: 1028 CELLS/UL (ref 850–3900)
LYMPHOCYTES NFR BLD AUTO: 36.7 %
MCH RBC QN AUTO: 29.5 PG (ref 27–33)
MCHC RBC AUTO-ENTMCNC: 32.9 G/DL (ref 32–36)
MCV RBC AUTO: 89.8 FL (ref 80–100)
METHYLMALONATE SERPL-SCNC: 163 NMOL/L (ref 87–318)
MONOCYTES # BLD AUTO: 283 CELLS/UL (ref 200–950)
MONOCYTES NFR BLD AUTO: 10.1 %
NEUTROPHILS # BLD AUTO: 1470 CELLS/UL (ref 1500–7800)
NEUTROPHILS NFR BLD AUTO: 52.5 %
PLATELET # BLD AUTO: 178 THOUSAND/UL (ref 140–400)
PMV BLD REES-ECKER: 9.7 FL (ref 7.5–12.5)
POTASSIUM SERPL-SCNC: 4.1 MMOL/L (ref 3.5–5.3)
PROT SERPL-MCNC: 6.4 G/DL (ref 6.1–8.1)
RBC # BLD AUTO: 4.4 MILLION/UL (ref 3.8–5.1)
SODIUM SERPL-SCNC: 141 MMOL/L (ref 135–146)
TIBC SERPL-MCNC: 337 MCG/DL (CALC) (ref 250–450)
WBC # BLD AUTO: 2.8 THOUSAND/UL (ref 3.8–10.8)

## 2023-02-10 ENCOUNTER — OFFICE VISIT (OUTPATIENT)
Dept: HEMATOLOGY ONCOLOGY | Facility: HOSPITAL | Age: 58
End: 2023-02-10

## 2023-02-10 VITALS
BODY MASS INDEX: 36.16 KG/M2 | OXYGEN SATURATION: 98 % | WEIGHT: 225 LBS | HEIGHT: 66 IN | RESPIRATION RATE: 14 BRPM | TEMPERATURE: 98 F | HEART RATE: 74 BPM | DIASTOLIC BLOOD PRESSURE: 82 MMHG | SYSTOLIC BLOOD PRESSURE: 122 MMHG

## 2023-02-10 DIAGNOSIS — D72.819 LEUKOPENIA, UNSPECIFIED TYPE: Primary | ICD-10-CM

## 2023-02-10 NOTE — PROGRESS NOTES
Hematology/Oncology Outpatient Follow- up Note  Neelam Burr 65 y o  female MRN: @ Encounter: [id number]        Date:  2/10/2023    Presenting Complaint/Diagnosis : Low white count with mild neutropenia and slightly low  Eosinophils since 2019    HPI:    J[facility] seen for initial consultation 3/31/2021 regarding  An isolated low white count since 2019   Looking through the blood work all the way back to 2019 the patient's white count has fluctuated between 2 5-3 0 with a normal hemoglobin and normal platelet count   The patient's neutrophils have fluctuated between a 1095 and normal   The most recent 1 was 1238   Patient herself has had no issues with frequent infections or the need for antibiotics  She does have a history of a gastric bypass and has been iron deficient in the past and was seen years ago for Venofer   She currently gives herself a B12 shot once a month  Previous Hematologic/ Oncologic History:    Venofer every 2 months but the patient discontinue secondary to co-pay and deductible issues    Current Hematologic/ Oncologic Treatment:    Oral vitamin supplement    Interval History:    Patient returns for follow-up visit  White count continues to run low at 2 8 with a hemoglobin of 13 0 and a platelet count of 417  Neutrophils were 1470 i e  still above the thousand  Rest of the differential was within acceptable limits  CMP was within acceptable limits  Ferritin was on the low side of normal and MMA was normal   Iron saturation was 35 with an iron of 117 and TIBC of 337  Patient herself is at baseline  Denies any nausea denies any vomiting denies any diarrhea  The rest of her 14 point review of systems today was negative  Test Results:    Imaging: No results found      Labs:   Lab Results   Component Value Date    WBC 2 8 (L) 01/27/2023    HGB 13 0 01/27/2023    HCT 39 5 01/27/2023    MCV 89 8 01/27/2023     01/27/2023     Lab Results   Component Value Date     06/18/2015    K 4 1 01/27/2023     01/27/2023    CO2 34 (H) 01/27/2023    ANIONGAP 8 06/18/2015    BUN 14 01/27/2023    CREATININE 0 71 01/27/2023    GLUCOSE 142 (H) 06/18/2015    GLUF 134 (H) 01/23/2018    CALCIUM 9 4 01/27/2023    AST 24 01/27/2023    ALT 15 01/27/2023    ALKPHOS 48 01/27/2023    EGFR 100 01/27/2023       Lab Results   Component Value Date    IRON 117 01/27/2023    TIBC 337 01/27/2023    FERRITIN 23 01/27/2023       Lab Results   Component Value Date    RVMHQBEC26 440 07/22/2022         ROS: As stated in the history of present illness otherwise his 14 point review of systems today was negative        Active Problems:   Patient Active Problem List   Diagnosis   • Hypertension   • History of gastric bypass   • Obesity due to excess calories   • Reversible cerebrovascular vasoconstriction syndrome   • Other headache syndrome   • Tremor, essential   • Anxiety   • Recurrent major depressive disorder, in partial remission (HCC)   • Supraventricular tachycardia (HCC)   • Severe episode of recurrent major depressive disorder, without psychotic features ([facility] [address] )   • Myeloproliferative disease ([facility] [address] )   • Iron deficiency anemia secondary to inadequate dietary iron intake       Past Medical History:   Past Medical History:   Diagnosis Date   • Anxiety    • Hypertension    • Reversible cerebrovascular vasoconstriction syndrome    • Tremor        Surgical History:   Past Surgical History:   Procedure Laterality Date   • CHOLECYSTECTOMY     • GASTRIC BYPASS  12/08/2000    for morbid obesity, last assessed 4/27/16       Family History:    Family History   Problem Relation Age of Onset   • Hypertension Mother    • Diabetes Mother    • Hypertension Father    • Prostate cancer Father 80   • Heart disease Brother    • Heart attack Brother    • Substance Abuse Brother    • Heart failure Brother    • Diabetes Brother    • Hypertension Brother    • Substance Abuse Sister    • Substance Abuse Family    • No Known Problems Daughter    • No Known Problems Maternal Grandmother    • No Known Problems Maternal Grandfather    • No Known Problems Paternal Grandmother    • No Known Problems Paternal Grandfather    • No Known Problems Maternal Aunt    • Mental illness Neg Hx         neg fam hx       Cancer-related family history includes Prostate cancer (age of onset: 80) in her father      Social History:   Social History     Socioeconomic History   • Marital status: /Civil Union     Spouse name: Not on file   • Number of children: Not on file   • Years of education: Not on file   • Highest education level: Not on file   Occupational History   • Not on file   Tobacco Use   • Smoking status: Former     Types: Cigarettes   • Smokeless tobacco: Never   • Tobacco comments:     social smoker, quit 2017 / never a smoker denied    Vaping Use   • Vaping Use: Never used   Substance and Sexual Activity   • Alcohol use: Yes     Comment: Social drinker per [employer]   • Drug use: No   • Sexual activity: Not on file   Other Topics Concern   • Not on file   Social History Narrative    Wears seatbelt    Denies exercise habits    Good dental hygiene    Lives with friend     Living situation: supportive and safe     No advance directives     Social Determinants of Health     Financial Resource Strain: Not on file   Food Insecurity: Not on file   Transportation Needs: Not on file   Physical Activity: Not on file   Stress: Not on file   Social Connections: Not on file   Intimate Partner Violence: Not on file   Housing Stability: Not on file       Current Medications:   Current Outpatient Medications   Medication Sig Dispense Refill   • amitriptyline (ELAVIL) 50 mg tablet Take 1 tablet (50 mg total) by mouth daily at bedtime 90 tablet 3   • B Complex Vitamins (VITAMIN B COMPLEX PO) Place under the tongue     • B-D TB SYRINGE 1CC/25GX5/8" 25G X 5/8" 1 ML MISC USING 1 EVERY 14 DAYS     • Blood Glucose Monitoring Suppl (FreeStyle Lite) MEGHA Testing twice daily 1 each 0   • Cholecalciferol (VITAMIN D) 125 MCG (5000 UT) CAPS Take 1 tablet by mouth daily     • cyanocobalamin 1,000 mcg/mL Inject 1 mL (1,000 mcg total) under the skin every 14 (fourteen) days 6 mL 3   • glucose blood (FREESTYLE LITE) test strip TEST TWICE A DAY **DX:R73 9** 100 strip 3   • glucose-vitamin C 4-0 006 g Chew 16 g Prn only     • Lancets (freestyle) lancets Testing twice daily 100 each 5   • lisinopril (ZESTRIL) 10 mg tablet Take 1 tablet (10 mg total) by mouth daily 90 tablet 3   • LORazepam (ATIVAN) 2 mg tablet TAKE 1 TABLET BY MOUTH EVERY 8 HOURS AS NEEDED FOR ANXIETY  90 tablet 2   • Multiple Vitamin-Folic Acid TABS Take 1 tablet by mouth daily     • primidone (MYSOLINE) 50 mg tablet TAKE 1 TABLET BY MOUTH TWICE A  tablet 3   • sucralfate (CARAFATE) 1 g tablet Take 1 tablet (1 g total) by mouth every 6 (six) hours as needed (pain) 30 tablet 3   • SYRINGE-NEEDLE, DISP, 3 ML (B-D 3CC LUER-FIOR SYR 25GX5/8") 25G X 5/8" 3 ML MISC Using 1 every 14 days 50 each 1   • triamterene-hydrochlorothiazide (DYAZIDE) 37 5-25 mg per capsule 1 every other day 90 capsule 3   • phentermine 30 MG capsule Take 1 capsule (30 mg total) by mouth every morning (Patient not taking: Reported on 2/10/2023) 30 capsule 3   • primidone (MYSOLINE) 50 mg tablet TAKE 1 TABLET BY MOUTH EVERY DAY (Patient not taking: Reported on 8/5/2022) 90 tablet 2     Current Facility-Administered Medications   Medication Dose Route Frequency Provider Last Rate Last Admin   • cyanocobalamin injection 1,000 mcg  1,000 mcg Intramuscular Q30 Days Elige Nyhan, MD   1,000 mcg at 12/23/20 3910       Allergies: No Known Allergies    Physical Exam:    Body surface area is 2 1 meters squared      Wt Readings from Last 3 Encounters:   02/10/23 102 kg (225 lb)   08/05/22 103 kg (226 lb)   07/08/22 102 kg (224 lb)        Temp Readings from Last 3 Encounters:   02/10/23 98 °F (36 7 °C) (Temporal)   08/05/22 97 8 °F (36 6 °C) (Temporal)   04/11/22 (!) 97 2 °F (36 2 °C) (Temporal)        BP Readings from Last 3 Encounters:   02/10/23 122/82   08/05/22 124/80   07/08/22 100/78         Pulse Readings from Last 3 Encounters:   02/10/23 74   08/05/22 78   07/08/22 70       Physical Exam     Constitutional   General appearance: No acute distress, well appearing and well nourished  Eyes   Conjunctiva and lids: No swelling, erythema or discharge  Pupils and irises: Equal, round and reactive to light  Ears, Nose, Mouth, and Throat   External inspection of ears and nose: Normal     Nasal mucosa, septum, and turbinates: Normal without edema or erythema  Oropharynx: Normal with no erythema, edema, exudate or lesions  Pulmonary   Respiratory effort: No increased work of breathing or signs of respiratory distress  Auscultation of lungs: Clear to auscultation  Cardiovascular   Palpation of heart: Normal PMI, no thrills  Auscultation of heart: Normal rate and rhythm, normal S1 and S2, without murmurs  Examination of extremities for edema and/or varicosities: Normal     Carotid pulses: Normal     Abdomen   Abdomen: Non-tender, no masses  Liver and spleen: No hepatomegaly or splenomegaly  Lymphatic   Palpation of lymph nodes in neck: No lymphadenopathy  Musculoskeletal   Gait and station: Normal     Digits and nails: Normal without clubbing or cyanosis  Inspection/palpation of joints, bones, and muscles: Normal     Skin   Skin and subcutaneous tissue: Normal without rashes or lesions  Neurologic   Cranial nerves: Cranial nerves 2-12 intact  Sensation: No sensory loss      Psychiatric   Orientation to person, place, and time: Normal     Mood and affect: Normal         Assessment / Plan:    The patient is a pleasant 50-year-old female who was referred to see us for slightly low white count with neutropenia for at least 2-3 years   The rest of her blood counts have remained within normal limits   She does have history of gastric bypass and B12 deficiency for which she is on B12 injections  We had a discussion that based on the chronicity of her mild leukopenia in combination with normal hemoglobin and platelets I doubt this is a leukemia   Possibilities include mild MDS versus medication induced   Her most recent MMA is normal   T-cell gene rearrangements studies were negative indicating she does not have LGL   CLL/ lymphoma diagnostic panel was negative for any evidence of leukemia I e  the flow cytometry  We put her on Venofer every 2 months because of her history of gastric bypass  She only got 2 doses but then refused any further secondary to the cost   I explained I suspect her fluctuating white count was secondary to 1 of the above etiologies but since it is not critical and she is asymptomatic observation is reasonable  White count continues to fluctuate but it is slightly low  ANC is still above the thousand  The patient will stay on observation for now  We will see her back in 9 months  Until then if she has any question she will call our office  Goals and Barriers:  Current Goal:  Prolong Survival from leukopenia  Barriers: None  Patient's Capacity to Self Care:  Patient able to self care  Portions of the record may have been created with voice recognition software  Occasional wrong word or "sound a like" substitutions may have occurred due to the inherent limitations of voice recognition software  Read the chart carefully and recognize, using context, where substitutions have occurred

## 2023-03-03 ENCOUNTER — HOSPITAL ENCOUNTER (OUTPATIENT)
Dept: RADIOLOGY | Facility: HOSPITAL | Age: 58
End: 2023-03-03
Attending: INTERNAL MEDICINE

## 2023-03-03 ENCOUNTER — OFFICE VISIT (OUTPATIENT)
Dept: FAMILY MEDICINE CLINIC | Facility: HOSPITAL | Age: 58
End: 2023-03-03

## 2023-03-03 VITALS
OXYGEN SATURATION: 99 % | WEIGHT: 225.6 LBS | HEIGHT: 66 IN | BODY MASS INDEX: 36.26 KG/M2 | SYSTOLIC BLOOD PRESSURE: 123 MMHG | HEART RATE: 69 BPM | DIASTOLIC BLOOD PRESSURE: 75 MMHG

## 2023-03-03 DIAGNOSIS — Z12.11 SCREEN FOR COLON CANCER: ICD-10-CM

## 2023-03-03 DIAGNOSIS — I47.1 SUPRAVENTRICULAR TACHYCARDIA (HCC): ICD-10-CM

## 2023-03-03 DIAGNOSIS — I67.841 REVERSIBLE CEREBROVASCULAR VASOCONSTRICTION SYNDROME: ICD-10-CM

## 2023-03-03 DIAGNOSIS — I10 PRIMARY HYPERTENSION: Chronic | ICD-10-CM

## 2023-03-03 DIAGNOSIS — D47.1 MYELOPROLIFERATIVE DISEASE (HCC): ICD-10-CM

## 2023-03-03 DIAGNOSIS — R07.9 RIGHT-SIDED CHEST PAIN: ICD-10-CM

## 2023-03-03 DIAGNOSIS — Z12.31 ENCOUNTER FOR SCREENING MAMMOGRAM FOR MALIGNANT NEOPLASM OF BREAST: ICD-10-CM

## 2023-03-03 DIAGNOSIS — G44.89 OTHER HEADACHE SYNDROME: ICD-10-CM

## 2023-03-03 DIAGNOSIS — K21.9 GASTROESOPHAGEAL REFLUX DISEASE, UNSPECIFIED WHETHER ESOPHAGITIS PRESENT: ICD-10-CM

## 2023-03-03 DIAGNOSIS — F33.2 SEVERE EPISODE OF RECURRENT MAJOR DEPRESSIVE DISORDER, WITHOUT PSYCHOTIC FEATURES (HCC): ICD-10-CM

## 2023-03-03 DIAGNOSIS — R07.9 RIGHT-SIDED CHEST PAIN: Primary | ICD-10-CM

## 2023-03-03 DIAGNOSIS — C91.Z0 LARGE GRANULAR LYMPHOCYTE DISORDER (HCC): ICD-10-CM

## 2023-03-03 LAB — VENTRICULAR RATE: 65 DEGREES

## 2023-03-03 RX ORDER — OMEPRAZOLE 20 MG/1
20 CAPSULE, DELAYED RELEASE ORAL
Qty: 90 CAPSULE | Refills: 3 | Status: SHIPPED | OUTPATIENT
Start: 2023-03-03 | End: 2024-02-26

## 2023-03-03 NOTE — PROGRESS NOTES
Assessment/Plan:     Diagnosis ICD-10-CM Associated Orders   1  Right-sided chest pain  R07 9 XR chest pa & lateral     POCT ECG      2  Primary hypertension  I10 Lipid Panel with Direct LDL reflex     Lipid Panel with Direct LDL reflex      3  Gastroesophageal reflux disease, unspecified whether esophagitis present  K21 9 omeprazole (PriLOSEC) 20 mg delayed release capsule      4  Other headache syndrome  G44 89       5  Reversible cerebrovascular vasoconstriction syndrome  I67 841       6  Supraventricular tachycardia (HCC)  I47 1 POCT ECG      7  Encounter for screening mammogram for malignant neoplasm of breast  Z12 31 Mammo screening bilateral w 3d & cad      8  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[address] )  C91  Z0       9  Myeloproliferative disease ([facility] [address] )  D47 1       10  Severe episode of recurrent major depressive disorder, without psychotic features ([facility] [address] )  F33 2       11  Screen for colon cancer  Z12 11 Cologuard          Problem List Items Addressed This Visit        Digestive    GERD (gastroesophageal reflux disease)     Has more symptoms with  Spaghetti sauce etc for past few months  Will reorder her omeprazole    Had revision af ew years ago with d/r   [facility]           Relevant Medications    omeprazole (PriLOSEC) 20 mg delayed release capsule       Cardiovascular and Mediastinum    Hypertension (Chronic)    Relevant Orders    Lipid Panel with Direct LDL reflex    Reversible cerebrovascular vasoconstriction syndrome     Seeing neurology - had   No recurrent symptoms recently- 2 years after the intiial injury had collapsed in bathroom at  [facility]- due to  hypoglycemia                       Supraventricular tachycardia ([facility] [address] )    Relevant Orders    POCT ECG       Other    Other headache syndrome    Severe episode of recurrent major depressive disorder, without psychotic features ([facility] [address] )     No current psychiatry input   Had been with a counselor but did not find it          Myeloproliferative disease ([facility] [address] ) Follows with Dr Paco Garcia - has been stable with low wbc         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[address] )     Has followed with Dr Paco Garcia - now seen  every 6 months        Other Visit Diagnoses     Right-sided chest pain    -  Primary    Relevant Orders    XR chest pa & lateral    POCT ECG    Encounter for screening mammogram for malignant neoplasm of breast        Relevant Orders    Mammo screening bilateral w 3d & cad    Screen for colon cancer        Relevant Orders    Cologuard            Return in about 6 weeks (around 4/14/2023) for Recheck  Subjective:    Patient ID: Pranav Grove is a 65 y o  female    1  Right sided chest pain- exercises in am with aerobics-   then noted in past week a  point of pain - dul and constant and then other times has severe sharppain -at night noted that  lying on side was causing pain- no sob   No nausea or belching- has some headache today, no cough  had pain last night  While unscrewing a cap  With arms at mid height- is able to reach overhead without ptin - localizes   On right inner aspect of breast  on right deeper region  Brother had mi age 34  Patient had seen cardiology for  preop evalaution before her  bariatric surgery   had medina and echo in 2019        The following portions of the patient's history were reviewed and updated as appropriate: allergies, current medications and problem list      Review of Systems   Constitutional: Negative for fever  Respiratory: Negative for cough and shortness of breath  Gastrointestinal: Negative for abdominal pain and nausea  Gerd symptoms-   All other systems reviewed and are negative          Objective:      Current Outpatient Medications:   •  amitriptyline (ELAVIL) 50 mg tablet, Take 1 tablet (50 mg total) by mouth daily at bedtime, Disp: 90 tablet, Rfl: 3  •  B Complex Vitamins (VITAMIN B COMPLEX PO), Place under the tongue, Disp: , Rfl:   •  B-D TB SYRINGE 1CC/25GX5/8" 25G X 5/8" 1 ML MISC, USING 1 EVERY 14 DAYS, Disp: , Rfl:   •  Blood Glucose Monitoring Suppl (FreeStyle Lite) MEGHA, Testing twice daily, Disp: 1 each, Rfl: 0  •  Cholecalciferol (VITAMIN D) 125 MCG (5000 UT) CAPS, Take 1 tablet by mouth daily, Disp: , Rfl:   •  cyanocobalamin 1,000 mcg/mL, Inject 1 mL (1,000 mcg total) under the skin every 14 (fourteen) days, Disp: 6 mL, Rfl: 3  •  glucose blood (FREESTYLE LITE) test strip, TEST TWICE A DAY **DX:R73 9**, Disp: 100 strip, Rfl: 3  •  glucose-vitamin C 4-0 006 g, Chew 16 g Prn only, Disp: , Rfl:   •  Lancets (freestyle) lancets, Testing twice daily, Disp: 100 each, Rfl: 5  •  lisinopril (ZESTRIL) 10 mg tablet, Take 1 tablet (10 mg total) by mouth daily, Disp: 90 tablet, Rfl: 3  •  LORazepam (ATIVAN) 2 mg tablet, TAKE 1 TABLET BY MOUTH EVERY 8 HOURS AS NEEDED FOR ANXIETY , Disp: 90 tablet, Rfl: 2  •  Multiple Vitamin-Folic Acid TABS, Take 1 tablet by mouth daily, Disp: , Rfl:   •  omeprazole (PriLOSEC) 20 mg delayed release capsule, Take 1 capsule (20 mg total) by mouth daily before breakfast, Disp: 90 capsule, Rfl: 3  •  primidone (MYSOLINE) 50 mg tablet, TAKE 1 TABLET BY MOUTH TWICE A DAY, Disp: 180 tablet, Rfl: 3  •  sucralfate (CARAFATE) 1 g tablet, Take 1 tablet (1 g total) by mouth every 6 (six) hours as needed (pain), Disp: 30 tablet, Rfl: 3  •  SYRINGE-NEEDLE, DISP, 3 ML (B-D 3CC LUER-FIOR SYR 25GX5/8") 25G X 5/8" 3 ML MISC, Using 1 every 14 days, Disp: 50 each, Rfl: 1  •  triamterene-hydrochlorothiazide (DYAZIDE) 37 5-25 mg per capsule, 1 every other day, Disp: 90 capsule, Rfl: 3  •  phentermine 30 MG capsule, Take 1 capsule (30 mg total) by mouth every morning (Patient not taking: Reported on 2/10/2023), Disp: 30 capsule, Rfl: 3  •  primidone (MYSOLINE) 50 mg tablet, TAKE 1 TABLET BY MOUTH EVERY DAY (Patient not taking: Reported on 8/5/2022), Disp: 90 tablet, Rfl: 2    Current Facility-Administered Medications:   •  cyanocobalamin injection 1,000 mcg, 1,000 mcg, Intramuscular, Q30 Days, Godwin Guadarrama MD Hernán, 1,000 mcg at 12/23/20 2219    Blood pressure 123/75, pulse 69, height 5' 6" (1 676 m), weight 102 kg (225 lb 9 6 oz), last menstrual period 12/21/2016, SpO2 99 %, not currently breastfeeding  Physical Exam  Vitals and nursing note reviewed  Constitutional:       General: She is not in acute distress  Appearance: She is not toxic-appearing  HENT:      Head: Normocephalic  Neck:      Thyroid: No thyromegaly  Vascular: No JVD  Cardiovascular:      Rate and Rhythm: Normal rate and regular rhythm  No extrasystoles are present  Heart sounds: No murmur heard  Pulmonary:      Effort: No respiratory distress  Breath sounds: Normal breath sounds  No stridor  Musculoskeletal:      Comments: Chest wall tenderness right 7 and 8th rib/ costochondral region with point tnedeness     Lymphadenopathy:      Cervical: No cervical adenopathy     Neurological:      Mental Status: She is alert       ekg- nsr - borderline left atrial enlargement- no acute st changes   if still having issues will order for  Echo and  nm stress test

## 2023-03-03 NOTE — ASSESSMENT & PLAN NOTE
Has more symptoms with  Spaghetti sauce etc for past few months  Will reorder her omeprazole    Had revision af ew years ago with d/r   Mingo Rogers

## 2023-03-08 DIAGNOSIS — G25.0 TREMOR, ESSENTIAL: ICD-10-CM

## 2023-03-08 RX ORDER — PRIMIDONE 50 MG/1
TABLET ORAL
Qty: 90 TABLET | Refills: 2 | Status: SHIPPED | OUTPATIENT
Start: 2023-03-08

## 2023-04-07 LAB — COLOGUARD RESULT REPORTABLE: NEGATIVE

## 2023-04-26 ENCOUNTER — OFFICE VISIT (OUTPATIENT)
Dept: FAMILY MEDICINE CLINIC | Facility: HOSPITAL | Age: 58
End: 2023-04-26

## 2023-04-26 VITALS
OXYGEN SATURATION: 99 % | WEIGHT: 224.4 LBS | HEART RATE: 58 BPM | BODY MASS INDEX: 36.07 KG/M2 | DIASTOLIC BLOOD PRESSURE: 68 MMHG | SYSTOLIC BLOOD PRESSURE: 120 MMHG | HEIGHT: 66 IN

## 2023-04-26 DIAGNOSIS — I47.1 SUPRAVENTRICULAR TACHYCARDIA (HCC): ICD-10-CM

## 2023-04-26 DIAGNOSIS — G44.89 OTHER HEADACHE SYNDROME: ICD-10-CM

## 2023-04-26 DIAGNOSIS — C91.Z0 LARGE GRANULAR LYMPHOCYTE DISORDER (HCC): ICD-10-CM

## 2023-04-26 DIAGNOSIS — I10 PRIMARY HYPERTENSION: Chronic | ICD-10-CM

## 2023-04-26 DIAGNOSIS — F33.41 RECURRENT MAJOR DEPRESSIVE DISORDER, IN PARTIAL REMISSION (HCC): ICD-10-CM

## 2023-04-26 DIAGNOSIS — Z98.84 HISTORY OF GASTRIC BYPASS: Chronic | ICD-10-CM

## 2023-04-26 DIAGNOSIS — E53.8 VITAMIN B12 DEFICIENCY: ICD-10-CM

## 2023-04-26 DIAGNOSIS — K21.9 GASTROESOPHAGEAL REFLUX DISEASE WITHOUT ESOPHAGITIS: Primary | ICD-10-CM

## 2023-04-26 DIAGNOSIS — G25.0 TREMOR, ESSENTIAL: ICD-10-CM

## 2023-04-26 PROBLEM — D47.1 MYELOPROLIFERATIVE DISEASE (HCC): Status: RESOLVED | Noted: 2022-01-31 | Resolved: 2023-04-26

## 2023-04-26 NOTE — PROGRESS NOTES
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[address] )  C91  Z0       7  Other headache syndrome  G44 89       8  History of gastric bypass  Z98 84           Problem List Items Addressed This Visit        Digestive    GERD (gastroesophageal reflux disease) - Primary     Improved symptoms after restarting low dose omeprazole at 20 mg daily            Cardiovascular and Mediastinum    Hypertension (Chronic)     Now doing a plant based diet- doing  Occasional  cheese         Supraventricular tachycardia (HCC)     No racing heart beats   avoids caffeine and doing a plant diet based diet            Nervous and Auditory    Tremor, essential     On mysoline            Other    History of gastric bypass (Chronic)     On b12 every other week         Other headache syndrome     Rare headaches about once monthly- used Tyelmol         Recurrent major depressive disorder, in partial remission (HCC)    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[address] )     Seen by Dr Tabby Alfaro in February- no frequent infections    will repeat the cbc in 6 months  Had chronic low wbc on his review- has appt in nov with Dr Suarez                No follow-ups on file  Subjective:    Patient ID: Eleanor Rueda is a 65 y o  female    HPI    The following portions of the patient's history were reviewed and updated as appropriate: allergies, current medications and problem list      Review of Systems   Constitutional: Negative for appetite change and fever  Planning for cruise to [facility] [place] [facility]: Negative for congestion and postnasal drip  Respiratory: Negative for shortness of breath  Cardiovascular: Negative for chest pain  "  Gastrointestinal: Negative for abdominal distention  All other systems reviewed and are negative          Objective:      Current Outpatient Medications:   •  amitriptyline (ELAVIL) 50 mg tablet, Take 1 tablet (50 mg total) by mouth daily at bedtime, Disp: 90 tablet, Rfl: 3  •  B Complex Vitamins (VITAMIN B COMPLEX PO), Place under the tongue, Disp: , Rfl:   •  B-D TB SYRINGE 1CC/25GX5/8\" 25G X 5/8\" 1 ML MISC, USING 1 EVERY 14 DAYS, Disp: , Rfl:   •  Blood Glucose Monitoring Suppl (FreeStyle Lite) MEGHA, Testing twice daily, Disp: 1 each, Rfl: 0  •  Cholecalciferol (VITAMIN D) 125 MCG (5000 UT) CAPS, Take 1 tablet by mouth daily, Disp: , Rfl:   •  cyanocobalamin 1,000 mcg/mL, Inject 1 mL (1,000 mcg total) under the skin every 14 (fourteen) days, Disp: 6 mL, Rfl: 3  •  glucose blood (FREESTYLE LITE) test strip, TEST TWICE A DAY **DX:R73 [id number]**, Disp: 100 strip, Rfl: 3  •  glucose-vitamin C 4-0 006 g, Chew 16 g Prn only, Disp: , Rfl:   •  Lancets (freestyle) lancets, Testing twice daily, Disp: 100 each, Rfl: 5  •  lisinopril (ZESTRIL) 10 mg tablet, Take 1 tablet (10 mg total) by mouth daily, Disp: 90 tablet, Rfl: 3  •  LORazepam (ATIVAN) 2 mg tablet, TAKE 1 TABLET BY MOUTH EVERY 8 HOURS AS NEEDED FOR ANXIETY , Disp: 90 tablet, Rfl: 2  •  Multiple Vitamin-Folic Acid TABS, Take 1 tablet by mouth daily, Disp: , Rfl:   •  omeprazole (PriLOSEC) 20 mg delayed release capsule, Take 1 capsule (20 mg total) by mouth daily before breakfast, Disp: 90 capsule, Rfl: 3  •  primidone (MYSOLINE) 50 mg tablet, TAKE 1 TABLET BY MOUTH TWICE A DAY, Disp: 180 tablet, Rfl: 3  •  sucralfate (CARAFATE) 1 g tablet, Take 1 tablet (1 g total) by mouth every 6 (six) hours as needed (pain), Disp: 30 tablet, Rfl: 3  •  SYRINGE-NEEDLE, DISP, 3 ML (B-D 3CC LUER-FIOR SYR 25GX5/8\") 25G X 5/8\" 3 ML MISC, Using 1 every 14 days, Disp: 50 each, Rfl: 1  •  triamterene-hydrochlorothiazide (DYAZIDE) 37 5-25 mg per capsule, 1 every other day, Disp: 90 capsule, " "Rfl: 3    Current Facility-Administered Medications:   •  cyanocobalamin injection 1,000 mcg, 1,000 mcg, Intramuscular, Q30 Days, Lakesha Ibrahim MD, 1,000 mcg at 12/23/20 3005    Blood pressure 120/68, pulse 58, height 5' 6\" (1 676 m), weight 102 kg (224 lb 6 4 oz), last menstrual period 12/21/2016, SpO2 99 %, not currently breastfeeding  Physical Exam  Vitals and nursing note reviewed  Constitutional:       General: She is not in acute distress  HENT:      Head: Normocephalic and atraumatic  Right Ear: There is no impacted cerumen  Left Ear: There is no impacted cerumen  Nose: No congestion  Eyes:      General: No scleral icterus  Right eye: No discharge  Left eye: No discharge  Cardiovascular:      Rate and Rhythm: Normal rate and regular rhythm  Heart sounds: No murmur heard  Pulmonary:      Effort: No respiratory distress  Breath sounds: No rhonchi  Abdominal:      General: There is no distension  Palpations: Abdomen is soft  Tenderness: There is no abdominal tenderness  Musculoskeletal:         General: No swelling or tenderness  Cervical back: No rigidity or tenderness  Skin:     Findings: No erythema  Neurological:      General: No focal deficit present  Psychiatric:         Mood and Affect: Mood normal          Thought Content:  Thought content normal          Judgment: Judgment normal         "

## 2023-04-26 NOTE — ASSESSMENT & PLAN NOTE
Seen by Dr Christo Garcia in February- no frequent infections    will repeat the cbc in 6 months  Had chronic low wbc on his review- has appt in nov with Dr Suarez

## 2023-05-22 ENCOUNTER — CLINICAL SUPPORT (OUTPATIENT)
Dept: FAMILY MEDICINE CLINIC | Facility: HOSPITAL | Age: 58
End: 2023-05-22

## 2023-05-22 DIAGNOSIS — Z23 NEED FOR TDAP VACCINATION: Primary | ICD-10-CM

## 2023-05-25 DIAGNOSIS — I10 ESSENTIAL HYPERTENSION: Chronic | ICD-10-CM

## 2023-05-25 DIAGNOSIS — F33.41 RECURRENT MAJOR DEPRESSIVE DISORDER, IN PARTIAL REMISSION (HCC): ICD-10-CM

## 2023-05-25 RX ORDER — AMITRIPTYLINE HYDROCHLORIDE 50 MG/1
TABLET, FILM COATED ORAL
Qty: 90 TABLET | Refills: 3 | Status: SHIPPED | OUTPATIENT
Start: 2023-05-25

## 2023-05-25 RX ORDER — LISINOPRIL 10 MG/1
TABLET ORAL
Qty: 90 TABLET | Refills: 3 | Status: SHIPPED | OUTPATIENT
Start: 2023-05-25

## 2023-06-28 DIAGNOSIS — E53.8 VITAMIN B12 DEFICIENCY: ICD-10-CM

## 2023-06-28 RX ORDER — CYANOCOBALAMIN 1000 UG/ML
1000 INJECTION, SOLUTION INTRAMUSCULAR; SUBCUTANEOUS
Qty: 6 ML | Refills: 3 | Status: SHIPPED | OUTPATIENT
Start: 2023-06-28

## 2023-06-28 NOTE — TELEPHONE ENCOUNTER
Medication failed [facility] protocol  Please forward to your office staff for further review as this medication was reviewed by a [facility] RN 
no abdominal pain, no bloating, no constipation, no diarrhea, no nausea and no vomiting.

## 2023-07-13 DIAGNOSIS — R73.09: ICD-10-CM

## 2023-07-13 RX ORDER — BLOOD-GLUCOSE METER
KIT MISCELLANEOUS
Qty: 100 STRIP | Refills: 3 | Status: SHIPPED | OUTPATIENT
Start: 2023-07-13

## 2023-07-13 NOTE — TELEPHONE ENCOUNTER
Patient called to check status for refill on     glucose blood (FREESTYLE LITE) test strip    She is going away this weekend and wanted to make sure she can get them before she leaves

## 2023-07-27 DIAGNOSIS — G25.0 TREMOR, ESSENTIAL: ICD-10-CM

## 2023-07-28 RX ORDER — PRIMIDONE 50 MG/1
TABLET ORAL
Qty: 180 TABLET | Refills: 3 | Status: SHIPPED | OUTPATIENT
Start: 2023-07-28

## 2023-08-07 DIAGNOSIS — I10 ESSENTIAL HYPERTENSION: Chronic | ICD-10-CM

## 2023-08-07 RX ORDER — TRIAMTERENE AND HYDROCHLOROTHIAZIDE 37.5; 25 MG/1; MG/1
CAPSULE ORAL
Qty: 45 CAPSULE | Refills: 7 | Status: SHIPPED | OUTPATIENT
Start: 2023-08-07

## 2023-09-13 DIAGNOSIS — F41.9 ANXIETY: ICD-10-CM

## 2023-09-13 RX ORDER — LORAZEPAM 2 MG/1
2 TABLET ORAL EVERY 8 HOURS PRN
Qty: 90 TABLET | Refills: 1 | Status: SHIPPED | OUTPATIENT
Start: 2023-09-13

## 2023-12-29 ENCOUNTER — TELEPHONE (OUTPATIENT)
Age: 58
End: 2023-12-29

## 2023-12-29 ENCOUNTER — TELEPHONE (OUTPATIENT)
Dept: HEMATOLOGY ONCOLOGY | Facility: CLINIC | Age: 58
End: 2023-12-29

## 2023-12-29 NOTE — TELEPHONE ENCOUNTER
Patient Call    Who are you speaking with? Patient    If it is not the patient, are they listed on an active communication consent form? N/A   What is the reason for this call? Patient calling to inform the office she moved to [place], she will not be rescheduling with [facility].    Does this require a call back? No   If a call back is required, please list best call back number N/A   If a call back is required, advise that a message will be forwarded to their care team and someone will return their call as soon as possible.   Did you relay this information to the patient? No

## 2025-04-10 ENCOUNTER — TELEPHONE (OUTPATIENT)
Dept: FAMILY MEDICINE CLINIC | Facility: HOSPITAL | Age: 60
End: 2025-04-10

## 2025-04-10 NOTE — TELEPHONE ENCOUNTER
Please has moved out of state, She now resides in [place]. Please assist in updating the chart by removing the PCP